# Patient Record
Sex: FEMALE | Race: WHITE | Employment: OTHER | URBAN - METROPOLITAN AREA
[De-identification: names, ages, dates, MRNs, and addresses within clinical notes are randomized per-mention and may not be internally consistent; named-entity substitution may affect disease eponyms.]

---

## 2019-02-21 RX ORDER — UBIDECARENONE 75 MG
5000 CAPSULE ORAL WEEKLY
COMMUNITY
End: 2020-09-30

## 2019-02-21 RX ORDER — METOPROLOL SUCCINATE 100 MG/1
100 TABLET, EXTENDED RELEASE ORAL DAILY
COMMUNITY
End: 2020-09-30

## 2019-02-21 RX ORDER — COVID-19 ANTIGEN TEST
220 KIT MISCELLANEOUS NIGHTLY
Status: ON HOLD | COMMUNITY
End: 2019-03-04

## 2019-02-23 ENCOUNTER — LAB ENCOUNTER (OUTPATIENT)
Dept: LAB | Age: 69
End: 2019-02-23
Attending: ORTHOPAEDIC SURGERY
Payer: COMMERCIAL

## 2019-02-23 DIAGNOSIS — M19.90 OSTEOARTHRITIS: ICD-10-CM

## 2019-02-23 LAB
ANTIBODY SCREEN: NEGATIVE
RH BLOOD TYPE: POSITIVE

## 2019-02-23 PROCEDURE — 87641 MR-STAPH DNA AMP PROBE: CPT

## 2019-02-23 PROCEDURE — 86900 BLOOD TYPING SEROLOGIC ABO: CPT

## 2019-02-23 PROCEDURE — 86850 RBC ANTIBODY SCREEN: CPT

## 2019-02-23 PROCEDURE — 36415 COLL VENOUS BLD VENIPUNCTURE: CPT

## 2019-02-23 PROCEDURE — 86901 BLOOD TYPING SEROLOGIC RH(D): CPT

## 2019-02-24 LAB — MRSA DNA SPEC QL NAA+PROBE: NEGATIVE

## 2019-02-28 ENCOUNTER — ANESTHESIA EVENT (OUTPATIENT)
Dept: SURGERY | Facility: HOSPITAL | Age: 69
DRG: 470 | End: 2019-02-28
Payer: COMMERCIAL

## 2019-02-28 ENCOUNTER — ANESTHESIA (OUTPATIENT)
Dept: SURGERY | Facility: HOSPITAL | Age: 69
DRG: 470 | End: 2019-02-28
Payer: COMMERCIAL

## 2019-02-28 ENCOUNTER — HOSPITAL ENCOUNTER (INPATIENT)
Facility: HOSPITAL | Age: 69
LOS: 6 days | Discharge: NURSING, REHAB OR LONG-TERM CARE | DRG: 470 | End: 2019-03-06
Attending: ORTHOPAEDIC SURGERY | Admitting: ORTHOPAEDIC SURGERY
Payer: COMMERCIAL

## 2019-02-28 ENCOUNTER — APPOINTMENT (OUTPATIENT)
Dept: GENERAL RADIOLOGY | Facility: HOSPITAL | Age: 69
DRG: 470 | End: 2019-02-28
Attending: ORTHOPAEDIC SURGERY
Payer: COMMERCIAL

## 2019-02-28 DIAGNOSIS — M19.90 OSTEOARTHRITIS: Primary | ICD-10-CM

## 2019-02-28 PROBLEM — I10 ESSENTIAL HYPERTENSION: Chronic | Status: ACTIVE | Noted: 2019-02-28

## 2019-02-28 PROBLEM — M17.11 OSTEOARTHRITIS OF RIGHT KNEE: Status: ACTIVE | Noted: 2019-02-28

## 2019-02-28 PROCEDURE — 3E0T3BZ INTRODUCTION OF ANESTHETIC AGENT INTO PERIPHERAL NERVES AND PLEXI, PERCUTANEOUS APPROACH: ICD-10-PCS | Performed by: ANESTHESIOLOGY

## 2019-02-28 PROCEDURE — 99232 SBSQ HOSP IP/OBS MODERATE 35: CPT | Performed by: HOSPITALIST

## 2019-02-28 PROCEDURE — 0SRC0J9 REPLACEMENT OF RIGHT KNEE JOINT WITH SYNTHETIC SUBSTITUTE, CEMENTED, OPEN APPROACH: ICD-10-PCS | Performed by: ORTHOPAEDIC SURGERY

## 2019-02-28 PROCEDURE — 73560 X-RAY EXAM OF KNEE 1 OR 2: CPT | Performed by: ORTHOPAEDIC SURGERY

## 2019-02-28 DEVICE — PSN TIB STM 5 DEG SZ C R: Type: IMPLANTABLE DEVICE | Status: FUNCTIONAL

## 2019-02-28 DEVICE — PSN ALL POLY PAT PLY 32MM: Type: IMPLANTABLE DEVICE | Status: FUNCTIONAL

## 2019-02-28 DEVICE — BIOMET BC R 1X40 US: Type: IMPLANTABLE DEVICE | Status: FUNCTIONAL

## 2019-02-28 DEVICE — PERSONA CEM FEM/CEM TIB/STD: Type: IMPLANTABLE DEVICE | Status: FUNCTIONAL

## 2019-02-28 DEVICE — PSN ASF PS 12MM PLY R 6-9CD: Type: IMPLANTABLE DEVICE | Status: FUNCTIONAL

## 2019-02-28 DEVICE — PSN FEM PS CMT CCR NRW SZ7 R: Type: IMPLANTABLE DEVICE | Status: FUNCTIONAL

## 2019-02-28 RX ORDER — DOCUSATE SODIUM 100 MG/1
100 CAPSULE, LIQUID FILLED ORAL 2 TIMES DAILY
Status: DISCONTINUED | OUTPATIENT
Start: 2019-02-28 | End: 2019-03-06

## 2019-02-28 RX ORDER — MORPHINE SULFATE 2 MG/ML
2 INJECTION, SOLUTION INTRAMUSCULAR; INTRAVENOUS EVERY 2 HOUR PRN
Status: ACTIVE | OUTPATIENT
Start: 2019-02-28 | End: 2019-03-01

## 2019-02-28 RX ORDER — BISACODYL 10 MG
10 SUPPOSITORY, RECTAL RECTAL
Status: DISCONTINUED | OUTPATIENT
Start: 2019-02-28 | End: 2019-03-06

## 2019-02-28 RX ORDER — ACETAMINOPHEN 325 MG/1
650 TABLET ORAL EVERY 4 HOURS PRN
Status: DISCONTINUED | OUTPATIENT
Start: 2019-03-01 | End: 2019-03-06

## 2019-02-28 RX ORDER — SENNOSIDES 8.6 MG
17.2 TABLET ORAL NIGHTLY
Status: DISCONTINUED | OUTPATIENT
Start: 2019-02-28 | End: 2019-03-06

## 2019-02-28 RX ORDER — MORPHINE SULFATE 2 MG/ML
1 INJECTION, SOLUTION INTRAMUSCULAR; INTRAVENOUS EVERY 2 HOUR PRN
Status: DISCONTINUED | OUTPATIENT
Start: 2019-03-01 | End: 2019-03-06

## 2019-02-28 RX ORDER — DIPHENHYDRAMINE HYDROCHLORIDE 50 MG/ML
25 INJECTION INTRAMUSCULAR; INTRAVENOUS ONCE AS NEEDED
Status: ACTIVE | OUTPATIENT
Start: 2019-02-28 | End: 2019-02-28

## 2019-02-28 RX ORDER — POLYETHYLENE GLYCOL 3350 17 G/17G
17 POWDER, FOR SOLUTION ORAL DAILY PRN
Status: DISCONTINUED | OUTPATIENT
Start: 2019-02-28 | End: 2019-03-06

## 2019-02-28 RX ORDER — LIDOCAINE HYDROCHLORIDE 10 MG/ML
INJECTION, SOLUTION EPIDURAL; INFILTRATION; INTRACAUDAL; PERINEURAL AS NEEDED
Status: DISCONTINUED | OUTPATIENT
Start: 2019-02-28 | End: 2019-02-28 | Stop reason: SURG

## 2019-02-28 RX ORDER — ONDANSETRON 2 MG/ML
4 INJECTION INTRAMUSCULAR; INTRAVENOUS EVERY 4 HOURS PRN
Status: ACTIVE | OUTPATIENT
Start: 2019-02-28 | End: 2019-03-02

## 2019-02-28 RX ORDER — DIPHENHYDRAMINE HYDROCHLORIDE 50 MG/ML
12.5 INJECTION INTRAMUSCULAR; INTRAVENOUS EVERY 4 HOURS PRN
Status: DISCONTINUED | OUTPATIENT
Start: 2019-02-28 | End: 2019-03-06

## 2019-02-28 RX ORDER — MORPHINE SULFATE 4 MG/ML
2 INJECTION, SOLUTION INTRAMUSCULAR; INTRAVENOUS EVERY 10 MIN PRN
Status: DISCONTINUED | OUTPATIENT
Start: 2019-02-28 | End: 2019-02-28 | Stop reason: HOSPADM

## 2019-02-28 RX ORDER — HYDROCODONE BITARTRATE AND ACETAMINOPHEN 5; 325 MG/1; MG/1
1 TABLET ORAL AS NEEDED
Status: DISCONTINUED | OUTPATIENT
Start: 2019-02-28 | End: 2019-02-28 | Stop reason: HOSPADM

## 2019-02-28 RX ORDER — EPHEDRINE SULFATE 50 MG/ML
INJECTION, SOLUTION INTRAVENOUS AS NEEDED
Status: DISCONTINUED | OUTPATIENT
Start: 2019-02-28 | End: 2019-02-28 | Stop reason: SURG

## 2019-02-28 RX ORDER — HYDROCODONE BITARTRATE AND ACETAMINOPHEN 5; 325 MG/1; MG/1
1 TABLET ORAL EVERY 4 HOURS PRN
Status: DISCONTINUED | OUTPATIENT
Start: 2019-03-01 | End: 2019-03-06

## 2019-02-28 RX ORDER — MORPHINE SULFATE 10 MG/ML
6 INJECTION, SOLUTION INTRAMUSCULAR; INTRAVENOUS EVERY 10 MIN PRN
Status: DISCONTINUED | OUTPATIENT
Start: 2019-02-28 | End: 2019-02-28 | Stop reason: HOSPADM

## 2019-02-28 RX ORDER — ONDANSETRON 2 MG/ML
INJECTION INTRAMUSCULAR; INTRAVENOUS AS NEEDED
Status: DISCONTINUED | OUTPATIENT
Start: 2019-02-28 | End: 2019-02-28 | Stop reason: SURG

## 2019-02-28 RX ORDER — MORPHINE SULFATE 4 MG/ML
4 INJECTION, SOLUTION INTRAMUSCULAR; INTRAVENOUS EVERY 10 MIN PRN
Status: DISCONTINUED | OUTPATIENT
Start: 2019-02-28 | End: 2019-02-28 | Stop reason: HOSPADM

## 2019-02-28 RX ORDER — ACETAMINOPHEN 500 MG
1000 TABLET ORAL EVERY 8 HOURS
Status: COMPLETED | OUTPATIENT
Start: 2019-02-28 | End: 2019-03-01

## 2019-02-28 RX ORDER — ACETAMINOPHEN 500 MG
1000 TABLET ORAL ONCE
Status: COMPLETED | OUTPATIENT
Start: 2019-02-28 | End: 2019-02-28

## 2019-02-28 RX ORDER — DEXAMETHASONE SODIUM PHOSPHATE 4 MG/ML
VIAL (ML) INJECTION AS NEEDED
Status: DISCONTINUED | OUTPATIENT
Start: 2019-02-28 | End: 2019-02-28 | Stop reason: SURG

## 2019-02-28 RX ORDER — SODIUM PHOSPHATE, DIBASIC AND SODIUM PHOSPHATE, MONOBASIC 7; 19 G/133ML; G/133ML
1 ENEMA RECTAL ONCE AS NEEDED
Status: DISCONTINUED | OUTPATIENT
Start: 2019-02-28 | End: 2019-03-06

## 2019-02-28 RX ORDER — SODIUM CHLORIDE, SODIUM LACTATE, POTASSIUM CHLORIDE, CALCIUM CHLORIDE 600; 310; 30; 20 MG/100ML; MG/100ML; MG/100ML; MG/100ML
INJECTION, SOLUTION INTRAVENOUS CONTINUOUS
Status: DISCONTINUED | OUTPATIENT
Start: 2019-02-28 | End: 2019-03-06

## 2019-02-28 RX ORDER — METOCLOPRAMIDE 10 MG/1
10 TABLET ORAL ONCE
Status: DISCONTINUED | OUTPATIENT
Start: 2019-02-28 | End: 2019-02-28 | Stop reason: HOSPADM

## 2019-02-28 RX ORDER — CEFAZOLIN SODIUM/WATER 2 G/20 ML
2 SYRINGE (ML) INTRAVENOUS ONCE
Status: DISCONTINUED | OUTPATIENT
Start: 2019-02-28 | End: 2019-02-28 | Stop reason: HOSPADM

## 2019-02-28 RX ORDER — MORPHINE SULFATE 4 MG/ML
6 INJECTION, SOLUTION INTRAMUSCULAR; INTRAVENOUS EVERY 2 HOUR PRN
Status: ACTIVE | OUTPATIENT
Start: 2019-02-28 | End: 2019-03-01

## 2019-02-28 RX ORDER — METOPROLOL TARTRATE 5 MG/5ML
2.5 INJECTION INTRAVENOUS ONCE
Status: DISCONTINUED | OUTPATIENT
Start: 2019-02-28 | End: 2019-02-28 | Stop reason: HOSPADM

## 2019-02-28 RX ORDER — MAGNESIUM HYDROXIDE 1200 MG/15ML
LIQUID ORAL CONTINUOUS PRN
Status: COMPLETED | OUTPATIENT
Start: 2019-02-28 | End: 2019-02-28

## 2019-02-28 RX ORDER — ONDANSETRON 2 MG/ML
4 INJECTION INTRAMUSCULAR; INTRAVENOUS ONCE AS NEEDED
Status: DISCONTINUED | OUTPATIENT
Start: 2019-02-28 | End: 2019-02-28 | Stop reason: HOSPADM

## 2019-02-28 RX ORDER — MORPHINE SULFATE 4 MG/ML
4 INJECTION, SOLUTION INTRAMUSCULAR; INTRAVENOUS EVERY 2 HOUR PRN
Status: DISCONTINUED | OUTPATIENT
Start: 2019-03-01 | End: 2019-03-06

## 2019-02-28 RX ORDER — METOCLOPRAMIDE HYDROCHLORIDE 5 MG/ML
10 INJECTION INTRAMUSCULAR; INTRAVENOUS EVERY 6 HOURS PRN
Status: ACTIVE | OUTPATIENT
Start: 2019-02-28 | End: 2019-03-02

## 2019-02-28 RX ORDER — OXYCODONE HYDROCHLORIDE 5 MG/1
5 TABLET ORAL EVERY 4 HOURS PRN
Status: DISPENSED | OUTPATIENT
Start: 2019-02-28 | End: 2019-03-01

## 2019-02-28 RX ORDER — NALOXONE HYDROCHLORIDE 0.4 MG/ML
80 INJECTION, SOLUTION INTRAMUSCULAR; INTRAVENOUS; SUBCUTANEOUS AS NEEDED
Status: DISCONTINUED | OUTPATIENT
Start: 2019-02-28 | End: 2019-02-28 | Stop reason: HOSPADM

## 2019-02-28 RX ORDER — MORPHINE SULFATE 15 MG/1
15 TABLET ORAL EVERY 4 HOURS PRN
Status: DISPENSED | OUTPATIENT
Start: 2019-02-28 | End: 2019-03-01

## 2019-02-28 RX ORDER — METOPROLOL SUCCINATE 100 MG/1
100 TABLET, EXTENDED RELEASE ORAL DAILY
Status: DISCONTINUED | OUTPATIENT
Start: 2019-03-01 | End: 2019-03-06

## 2019-02-28 RX ORDER — CEFAZOLIN SODIUM/WATER 2 G/20 ML
2 SYRINGE (ML) INTRAVENOUS EVERY 8 HOURS
Status: DISCONTINUED | OUTPATIENT
Start: 2019-02-28 | End: 2019-02-28

## 2019-02-28 RX ORDER — CEFAZOLIN SODIUM/WATER 2 G/20 ML
2 SYRINGE (ML) INTRAVENOUS EVERY 8 HOURS
Status: COMPLETED | OUTPATIENT
Start: 2019-02-28 | End: 2019-03-01

## 2019-02-28 RX ORDER — OXYCODONE HYDROCHLORIDE 5 MG/1
10 TABLET ORAL EVERY 4 HOURS PRN
Status: ACTIVE | OUTPATIENT
Start: 2019-02-28 | End: 2019-03-01

## 2019-02-28 RX ORDER — FAMOTIDINE 20 MG/1
20 TABLET ORAL ONCE
Status: DISCONTINUED | OUTPATIENT
Start: 2019-02-28 | End: 2019-02-28 | Stop reason: HOSPADM

## 2019-02-28 RX ORDER — DIPHENHYDRAMINE HCL 25 MG
25 CAPSULE ORAL EVERY 4 HOURS PRN
Status: DISCONTINUED | OUTPATIENT
Start: 2019-02-28 | End: 2019-03-06

## 2019-02-28 RX ORDER — SODIUM CHLORIDE 0.9 % (FLUSH) 0.9 %
10 SYRINGE (ML) INJECTION AS NEEDED
Status: DISCONTINUED | OUTPATIENT
Start: 2019-02-28 | End: 2019-03-06

## 2019-02-28 RX ORDER — HYDROCODONE BITARTRATE AND ACETAMINOPHEN 5; 325 MG/1; MG/1
2 TABLET ORAL EVERY 4 HOURS PRN
Status: DISCONTINUED | OUTPATIENT
Start: 2019-03-01 | End: 2019-03-06

## 2019-02-28 RX ORDER — HYDROCODONE BITARTRATE AND ACETAMINOPHEN 5; 325 MG/1; MG/1
2 TABLET ORAL AS NEEDED
Status: DISCONTINUED | OUTPATIENT
Start: 2019-02-28 | End: 2019-02-28 | Stop reason: HOSPADM

## 2019-02-28 RX ORDER — MORPHINE SULFATE 4 MG/ML
4 INJECTION, SOLUTION INTRAMUSCULAR; INTRAVENOUS EVERY 2 HOUR PRN
Status: ACTIVE | OUTPATIENT
Start: 2019-02-28 | End: 2019-03-01

## 2019-02-28 RX ORDER — MORPHINE SULFATE 2 MG/ML
2 INJECTION, SOLUTION INTRAMUSCULAR; INTRAVENOUS EVERY 2 HOUR PRN
Status: DISCONTINUED | OUTPATIENT
Start: 2019-03-01 | End: 2019-03-06

## 2019-02-28 RX ORDER — SODIUM CHLORIDE, SODIUM LACTATE, POTASSIUM CHLORIDE, CALCIUM CHLORIDE 600; 310; 30; 20 MG/100ML; MG/100ML; MG/100ML; MG/100ML
INJECTION, SOLUTION INTRAVENOUS CONTINUOUS
Status: DISCONTINUED | OUTPATIENT
Start: 2019-02-28 | End: 2019-02-28 | Stop reason: HOSPADM

## 2019-02-28 RX ADMIN — EPHEDRINE SULFATE 5 MG: 50 INJECTION, SOLUTION INTRAVENOUS at 11:26:00

## 2019-02-28 RX ADMIN — EPHEDRINE SULFATE 5 MG: 50 INJECTION, SOLUTION INTRAVENOUS at 11:24:00

## 2019-02-28 RX ADMIN — LIDOCAINE HYDROCHLORIDE 25 MG: 10 INJECTION, SOLUTION EPIDURAL; INFILTRATION; INTRACAUDAL; PERINEURAL at 11:16:00

## 2019-02-28 RX ADMIN — SODIUM CHLORIDE, SODIUM LACTATE, POTASSIUM CHLORIDE, CALCIUM CHLORIDE: 600; 310; 30; 20 INJECTION, SOLUTION INTRAVENOUS at 12:43:00

## 2019-02-28 RX ADMIN — EPHEDRINE SULFATE 5 MG: 50 INJECTION, SOLUTION INTRAVENOUS at 12:27:00

## 2019-02-28 RX ADMIN — DEXAMETHASONE SODIUM PHOSPHATE 4 MG: 4 MG/ML VIAL (ML) INJECTION at 12:22:00

## 2019-02-28 RX ADMIN — ONDANSETRON 4 MG: 2 INJECTION INTRAMUSCULAR; INTRAVENOUS at 12:22:00

## 2019-02-28 NOTE — ANESTHESIA PREPROCEDURE EVALUATION
Anesthesia PreOp Note    HPI:     Cheikh Vo is a 70year old female who presents for preoperative consultation requested by: Ellis Diaz MD    Date of Surgery: 2/28/2019    Procedure(s):  KNEE TOTAL REPLACEMENT  Indication: osteoarthritis    Rel education: Not on file      Highest education level: Not on file    Occupational History      Not on file    Social Needs      Financial resource strain: Not on file      Food insecurity:        Worry: Not on file        Inability: Not on file      Transpo notes reviewed    No history of anesthetic complications   Airway   Mallampati: II  Neck ROM: full  Dental - normal exam     Pulmonary - negative ROS and normal exam   Cardiovascular - negative ROS and normal exam  (+) hypertension,     Neuro/Psych - negat

## 2019-02-28 NOTE — BRIEF OP NOTE
Pre-Operative Diagnosis: osteoarthritis     Post-Operative Diagnosis: osteoarthritis      Procedure Performed:   Procedure(s):  right total knee arthroplasty    Surgeon(s) and Role:     Vicky Garcia MD - Primary    Assistant(s):  Surgical Assistant.:

## 2019-02-28 NOTE — ANESTHESIA POSTPROCEDURE EVALUATION
Patient: Presley Greene    Procedure Summary     Date:  02/28/19 Room / Location:  [address] [place] [address] MAIN OR 05 / [address] [place] [address] MAIN OR    Anesthesia Start:  0518 Anesthesia Stop:  1243    Procedure:  KNEE TOTAL REPLACEMENT (Right ) Diagnosis:  (osteoarthritis)    Surgeon:  Chiara Tate

## 2019-02-28 NOTE — H&P
[address] Babs Webster Patient Status:  Surgery Admit    1950 MRN [id number]   Location [address] [facility] Attending Sandford Duane, MD   Hosp Day # 0 PCP No primary care pr moist. Pupils are equal and round, reactive to light and accommodate. Pupils are approximately 3mm and react to 2mm with reaction to light. Oropharynx is clear. Neck: No tenderness to palpitation.   Full range of motion to flexion and extension, lateral

## 2019-02-28 NOTE — OR NURSING
Gosia Estrada Dark at bedside to , pt questions answered instructed on pian management, plan of care and room assignment.

## 2019-02-28 NOTE — ANESTHESIA PROCEDURE NOTES
ANESTHESIA INTUBATION  Urgency: elective      General Information and Staff    Patient location during procedure: OR  Anesthesiologist: Lynn Swanson MD  Resident/CRNA: Che Chung CRNA  Performed: anesthesiologist and CRNA     Indications and

## 2019-02-28 NOTE — ANESTHESIA PROCEDURE NOTES
Peripheral Block    Anesthesiologist:  Stalin Jha MD  CRNA:  Gracia Arzola CRNA  Performed by:   Anesthesiologist  Patient Location:  PACU  Start Time:  2/28/2019 10:56 AM  End Time:  2/28/2019 10:59 AM  Site Identification: ultrasound guided,

## 2019-02-28 NOTE — PHYSICAL THERAPY NOTE
PHYSICAL THERAPY KNEE EVALUATION - INPATIENT       Room Number: 432/432-A  Evaluation Date: 2/28/2019  Type of Evaluation: Initial  Physician Order: PT Eval and Treat    Presenting Problem: R TKA  Reason for Therapy: Mobility Dysfunction and Discharge Plan conservative management.  she complains of debilitating pain.  She elects right total knee arthroplasty.  Surgery risks including but not limited to fx/ nerve injury/ blood loss/ blood clots/ infection/ contracture/ failure of prosthesis and continued pain blankets)?: A Little   -   Sitting down on and standing up from a chair with arms (e.g., wheelchair, bedside commode, etc.): A Little   -   Moving from lying on back to sitting on the side of the bed?: A Little   How much help from another person does the instructions provided in preparation for discharge.    Goal #6  Current Status

## 2019-03-01 ENCOUNTER — APPOINTMENT (OUTPATIENT)
Dept: CT IMAGING | Facility: HOSPITAL | Age: 69
DRG: 470 | End: 2019-03-01
Attending: HOSPITALIST
Payer: COMMERCIAL

## 2019-03-01 ENCOUNTER — APPOINTMENT (OUTPATIENT)
Dept: CV DIAGNOSTICS | Facility: HOSPITAL | Age: 69
DRG: 470 | End: 2019-03-01
Attending: HOSPITALIST
Payer: COMMERCIAL

## 2019-03-01 ENCOUNTER — APPOINTMENT (OUTPATIENT)
Dept: ULTRASOUND IMAGING | Facility: HOSPITAL | Age: 69
DRG: 470 | End: 2019-03-01
Attending: HOSPITALIST
Payer: COMMERCIAL

## 2019-03-01 LAB
ANION GAP SERPL CALC-SCNC: 9 MMOL/L (ref 0–18)
BASOPHILS # BLD AUTO: 0.01 X10(3) UL (ref 0–0.2)
BASOPHILS NFR BLD AUTO: 0.1 %
BUN BLD-MCNC: 13 MG/DL (ref 7–18)
BUN/CREAT SERPL: 15.9 (ref 10–20)
CALCIUM BLD-MCNC: 8.1 MG/DL (ref 8.5–10.1)
CHLORIDE SERPL-SCNC: 106 MMOL/L (ref 98–107)
CO2 SERPL-SCNC: 25 MMOL/L (ref 21–32)
CREAT BLD-MCNC: 0.82 MG/DL (ref 0.55–1.02)
DEPRECATED RDW RBC AUTO: 44.3 FL (ref 35.1–46.3)
DEPRECATED RDW RBC AUTO: 46 FL (ref 35.1–46.3)
EOSINOPHIL # BLD AUTO: 0.01 X10(3) UL (ref 0–0.7)
EOSINOPHIL NFR BLD AUTO: 0.1 %
ERYTHROCYTE [DISTWIDTH] IN BLOOD BY AUTOMATED COUNT: 16.1 % (ref 11–15)
ERYTHROCYTE [DISTWIDTH] IN BLOOD BY AUTOMATED COUNT: 16.3 % (ref 11–15)
EST. AVERAGE GLUCOSE BLD GHB EST-MCNC: 126 MG/DL (ref 68–126)
GLUCOSE BLD-MCNC: 157 MG/DL (ref 70–99)
GLUCOSE BLDC GLUCOMTR-MCNC: 169 MG/DL (ref 70–99)
HAV IGM SER QL: 2.1 MG/DL (ref 1.6–2.6)
HBA1C MFR BLD HPLC: 6 % (ref ?–5.7)
HCT VFR BLD AUTO: 24.4 % (ref 35–48)
HCT VFR BLD AUTO: 26.9 % (ref 35–48)
HGB BLD-MCNC: 7.7 G/DL (ref 12–16)
HGB BLD-MCNC: 8.4 G/DL (ref 12–16)
IMM GRANULOCYTES # BLD AUTO: 0.04 X10(3) UL (ref 0–1)
IMM GRANULOCYTES NFR BLD: 0.4 %
LYMPHOCYTES # BLD AUTO: 1.42 X10(3) UL (ref 1–4)
LYMPHOCYTES NFR BLD AUTO: 15 %
MCH RBC QN AUTO: 23.7 PG (ref 26–34)
MCH RBC QN AUTO: 24.2 PG (ref 26–34)
MCHC RBC AUTO-ENTMCNC: 31.2 G/DL (ref 31–37)
MCHC RBC AUTO-ENTMCNC: 31.6 G/DL (ref 31–37)
MCV RBC AUTO: 76 FL (ref 80–100)
MCV RBC AUTO: 76.7 FL (ref 80–100)
MONOCYTES # BLD AUTO: 1 X10(3) UL (ref 0.1–1)
MONOCYTES NFR BLD AUTO: 10.6 %
NEUTROPHILS # BLD AUTO: 6.97 X10 (3) UL (ref 1.5–7.7)
NEUTROPHILS # BLD AUTO: 6.97 X10(3) UL (ref 1.5–7.7)
NEUTROPHILS NFR BLD AUTO: 73.8 %
OSMOLALITY SERPL CALC.SUM OF ELEC: 293 MOSM/KG (ref 275–295)
PLATELET # BLD AUTO: 156 10(3)UL (ref 150–450)
PLATELET # BLD AUTO: 160 10(3)UL (ref 150–450)
POTASSIUM SERPL-SCNC: 3.5 MMOL/L (ref 3.5–5.1)
RBC # BLD AUTO: 3.18 X10(6)UL (ref 3.8–5.3)
RBC # BLD AUTO: 3.54 X10(6)UL (ref 3.8–5.3)
SODIUM SERPL-SCNC: 140 MMOL/L (ref 136–145)
TROPONIN I SERPL-MCNC: <0.045 NG/ML (ref ?–0.04)
WBC # BLD AUTO: 8.3 X10(3) UL (ref 4–11)
WBC # BLD AUTO: 9.5 X10(3) UL (ref 4–11)

## 2019-03-01 PROCEDURE — 70450 CT HEAD/BRAIN W/O DYE: CPT | Performed by: HOSPITALIST

## 2019-03-01 PROCEDURE — 99291 CRITICAL CARE FIRST HOUR: CPT | Performed by: HOSPITALIST

## 2019-03-01 PROCEDURE — 93306 TTE W/DOPPLER COMPLETE: CPT | Performed by: HOSPITALIST

## 2019-03-01 PROCEDURE — 93880 EXTRACRANIAL BILAT STUDY: CPT | Performed by: HOSPITALIST

## 2019-03-01 RX ORDER — SODIUM CHLORIDE 9 MG/ML
INJECTION, SOLUTION INTRAVENOUS
Status: DISPENSED
Start: 2019-03-01 | End: 2019-03-02

## 2019-03-01 RX ORDER — POTASSIUM CHLORIDE 20 MEQ/1
40 TABLET, EXTENDED RELEASE ORAL EVERY 4 HOURS
Status: COMPLETED | OUTPATIENT
Start: 2019-03-01 | End: 2019-03-01

## 2019-03-01 RX ORDER — SODIUM CHLORIDE 9 MG/ML
INJECTION, SOLUTION INTRAVENOUS
Status: COMPLETED
Start: 2019-03-01 | End: 2019-03-01

## 2019-03-01 RX ORDER — SODIUM CHLORIDE 9 MG/ML
INJECTION, SOLUTION INTRAVENOUS CONTINUOUS
Status: DISCONTINUED | OUTPATIENT
Start: 2019-03-01 | End: 2019-03-06

## 2019-03-01 NOTE — PHYSICAL THERAPY NOTE
Patient unable to be see for her pm session d/t testing after RRT was called during am session. RN aware.

## 2019-03-01 NOTE — PROGRESS NOTES
RRT called while patient was working with physical therapy after feeling dizzy after getting up and passed out. Pt with no complaints of dizziness prior to RRT. 500cc of NS bolus, CT of head, 2D Echo, repeat CBC at 1500 ordered.  Patient made comfortable an

## 2019-03-01 NOTE — PROGRESS NOTES
[place] [facility]    Progress Note    Madeleine Bigger Patient Status:  Inpatient    1950 MRN [id number]   Location [facility] 4W/SW/SE Attending Skeeter Jeans, MD   Hosp Day # 1 PCP No primary care provider on file.        Subj Sulfate IR, morphINE sulfate **OR** morphINE sulfate **OR** morphINE sulfate, Normal Saline Flush, sodium chloride 0.9%, PEG 3350, magnesium hydroxide, bisacodyl, FLEET ENEMA, ondansetron HCl, Metoclopramide HCl, Prochlorperazine Edisylate, diphenhydrAMINE events during the episode  -EKG ok  -check orthostatics early evening if able  -IVF bolus 500cc then 100cc/hr  -monitor hgb and drain output   -correct low K  -neurochecks   -CT brain  -echo  -carotids      Osteoarthritis of right knee  -pain control  -xar

## 2019-03-01 NOTE — PHYSICAL THERAPY NOTE
PHYSICAL THERAPY KNEE TREATMENT NOTE - INPATIENT     Room Number: 432/432-A             Presenting Problem: R TKA    Problem List  Principal Problem:    Osteoarthritis of right knee  Active Problems:    Essential hypertension      PHYSICAL THERAPY ASSESSME tested    ACTIVITY TOLERANCE  Pulse: 82  Heart Rate Source: Monitor  Resp: 18  BP: 133/53  BP Location: Right arm  BP Method: Automatic  Patient Position: Lying    O2 WALK  SPO2 on Room Air at Rest: 100               AM-PAC '6-Clicks' INPATIENT SHORT FORM is able to demonstrate supine - sit EOB @ level: modified independent     Goal #1   Current Status Min A   Goal #2 Patient is able to demonstrate transfers Sit to/from Stand at assistance level: modified independent     Goal #2  Current Status Min A   Goal

## 2019-03-01 NOTE — CM/SW NOTE
SW met with the patient at bedside. The patient lives in 1 level house  3 steps to get in. The patient responds being independent prior to admission with all ADL's, ambulation and driving. Patient denies use of any DME.      Patient would like to go to [facility]

## 2019-03-01 NOTE — PROGRESS NOTES
[place] [facility] - [facility]    Progress Note    Oralia Sneed Patient Status:  Inpatient    1950 MRN [id number]   Location [facility] 4W/SW/SE Attending Gloria Bose MD   Hosp Day # 1 PCP No primary care provider on file.      Subj

## 2019-03-01 NOTE — PROGRESS NOTES
RRT    *See RRT Documentation Record*    Reason the RRT was called: Pt collapsed back into bed after ambulating to bathroom. Assessment of patient leading up to RRT: Pt was sturdy and assisted into the bathroom. No complaints by pt.      Interventions/T

## 2019-03-02 LAB
ANION GAP SERPL CALC-SCNC: 6 MMOL/L (ref 0–18)
ANTIBODY SCREEN: NEGATIVE
BASOPHILS # BLD AUTO: 0.03 X10(3) UL (ref 0–0.2)
BASOPHILS NFR BLD AUTO: 0.4 %
BUN BLD-MCNC: 6 MG/DL (ref 7–18)
BUN/CREAT SERPL: 14.3 (ref 10–20)
CALCIUM BLD-MCNC: 7.6 MG/DL (ref 8.5–10.1)
CHLORIDE SERPL-SCNC: 110 MMOL/L (ref 98–107)
CO2 SERPL-SCNC: 24 MMOL/L (ref 21–32)
CREAT BLD-MCNC: 0.42 MG/DL (ref 0.55–1.02)
DEPRECATED RDW RBC AUTO: 46.9 FL (ref 35.1–46.3)
EOSINOPHIL # BLD AUTO: 0.08 X10(3) UL (ref 0–0.7)
EOSINOPHIL NFR BLD AUTO: 1 %
ERYTHROCYTE [DISTWIDTH] IN BLOOD BY AUTOMATED COUNT: 16.5 % (ref 11–15)
GLUCOSE BLD-MCNC: 102 MG/DL (ref 70–99)
HCT VFR BLD AUTO: 25.5 % (ref 35–48)
HGB BLD-MCNC: 7.9 G/DL (ref 12–16)
IMM GRANULOCYTES # BLD AUTO: 0.03 X10(3) UL (ref 0–1)
IMM GRANULOCYTES NFR BLD: 0.4 %
LYMPHOCYTES # BLD AUTO: 1.04 X10(3) UL (ref 1–4)
LYMPHOCYTES NFR BLD AUTO: 13 %
MCH RBC QN AUTO: 24 PG (ref 26–34)
MCHC RBC AUTO-ENTMCNC: 31 G/DL (ref 31–37)
MCV RBC AUTO: 77.5 FL (ref 80–100)
MONOCYTES # BLD AUTO: 0.83 X10(3) UL (ref 0.1–1)
MONOCYTES NFR BLD AUTO: 10.4 %
NEUTROPHILS # BLD AUTO: 5.98 X10 (3) UL (ref 1.5–7.7)
NEUTROPHILS # BLD AUTO: 5.98 X10(3) UL (ref 1.5–7.7)
NEUTROPHILS NFR BLD AUTO: 74.8 %
OSMOLALITY SERPL CALC.SUM OF ELEC: 288 MOSM/KG (ref 275–295)
PLATELET # BLD AUTO: 145 10(3)UL (ref 150–450)
POTASSIUM SERPL-SCNC: 3.9 MMOL/L (ref 3.5–5.1)
RBC # BLD AUTO: 3.29 X10(6)UL (ref 3.8–5.3)
RH BLOOD TYPE: POSITIVE
SODIUM SERPL-SCNC: 140 MMOL/L (ref 136–145)
WBC # BLD AUTO: 8 X10(3) UL (ref 4–11)

## 2019-03-02 PROCEDURE — 30233N1 TRANSFUSION OF NONAUTOLOGOUS RED BLOOD CELLS INTO PERIPHERAL VEIN, PERCUTANEOUS APPROACH: ICD-10-PCS | Performed by: HOSPITALIST

## 2019-03-02 PROCEDURE — 99291 CRITICAL CARE FIRST HOUR: CPT | Performed by: HOSPITALIST

## 2019-03-02 RX ORDER — MIDODRINE HYDROCHLORIDE 5 MG/1
2.5 TABLET ORAL 3 TIMES DAILY
Status: DISCONTINUED | OUTPATIENT
Start: 2019-03-02 | End: 2019-03-06

## 2019-03-02 RX ORDER — SODIUM CHLORIDE 0.9 % (FLUSH) 0.9 %
10 SYRINGE (ML) INJECTION AS NEEDED
Status: DISCONTINUED | OUTPATIENT
Start: 2019-03-02 | End: 2019-03-06

## 2019-03-02 RX ORDER — SODIUM CHLORIDE 9 MG/ML
INJECTION, SOLUTION INTRAVENOUS ONCE
Status: COMPLETED | OUTPATIENT
Start: 2019-03-02 | End: 2019-03-02

## 2019-03-02 NOTE — OCCUPATIONAL THERAPY NOTE
OCCUPATIONAL THERAPY EVALUATION - INPATIENT      Room Number: 404/404-A  Evaluation Date: 3/2/2019  Type of Evaluation: Initial  Presenting Problem: (R TKA ); RRT called during evaluation    Physician Order: IP Consult to Occupational Therapy  Reason for T with HH, however pt would benefit from subacute rehab given RRTs called each time pt gets out of bed in the past day.     DISCHARGE RECOMMENDATIONS  OT Discharge Recommendations: 24 hour care/supervision;Sub-acute rehabilitation       PLAN  OT Treatment Jagdish a. m. )      COGNITION  Overall Cognitive Status:  WFL - within functional limits  Orientation Level:  oriented x4  Following Commands:  follows one step commands with repetition    RANGE OF MOTION   Upper extremity ROM is within functional limits     ALAN at sink level with SBA. Comment:    Patient will complete toileting with SBA.    Comment:         Goals  on: 2019  Frequency: 3-5x/week     BORIS Renteria/DELVIS 3/2/2019

## 2019-03-02 NOTE — PHYSICAL THERAPY NOTE
PHYSICAL THERAPY KNEE TREATMENT NOTE - INPATIENT     Room Number: 432/432-A             Presenting Problem: R TKA    Problem List  Principal Problem:    Osteoarthritis of right knee  Active Problems:    Essential hypertension    Syncope      PHYSICAL THERA adjusting bedclothes, sheets and blankets)?: A Little   -   Sitting down on and standing up from a chair with arms (e.g., wheelchair, bedside commode, etc.): A Little   -   Moving from lying on back to sitting on the side of the bed?: A Little   How much h Current Status 20  ft with RW with Mod A   Goal #4 Patient will negotiate 3 stairs/one curb w/ assistive device and supervision   Goal #4   Current Status NT   Goal #5  AROM 0 degrees extension to 95 degrees flexion     Goal #5   Current Status In progre

## 2019-03-02 NOTE — PROGRESS NOTES
RRT    *See RRT Documentation Record*    Reason the RRT was called: pt unresponsive  Assessment of patient leading up to RRT: pt checked for orthostatic prior getting OOB, denied any dizziness and was able to walk to bathroom with PT/OT.  Started to c/o diz

## 2019-03-02 NOTE — CM/SW NOTE
RUTHANN spoke with RN regarding the pt's pending arrangements. Pt had been referred to [address] [place] [address]. However, insurance auth was NOT obtained prior to the weekend. Insurance auth request will be pursued on Monday when they re-open.     [place] [facility]

## 2019-03-02 NOTE — PLAN OF CARE
Problem: SKIN/TISSUE INTEGRITY - ADULT  Goal: Incision(s), wounds(s) or drain site(s) healing without S/S of infection  INTERVENTIONS:  - Assess and document risk factors for pressure ulcer development  - Assess and document skin integrity  - Assess and do orthostatic blood pressures later in day.    Goal: Maintain proper alignment of affected body part  INTERVENTIONS:  - Support and protect limb and body alignment per provider's orders  - Instruct and reinforce with patient and family use of appropriate assi light)  - Provide an  as needed  - Communicate barriers and strategies to overcome with those who interact with patient  Outcome: Progressing  Patient Tajik speaking, family at bedside to translate, able to verbalize needs.

## 2019-03-02 NOTE — PROGRESS NOTES
[facility]    Progress Note    Cheikh Vo Patient Status:  Inpatient    1950 MRN [id number]   Location [facility] 4W/SW/SE Attending Farideh Rea MD   Hosp Day # 2 PCP No primary care provider on file.      Subjecti

## 2019-03-02 NOTE — SIGNIFICANT EVENT
Rapid response/progress note    Rapid response called by nurse because of a episode of loss of consciousness. Patient was walking to the bathroom with assistance. She made it to the toilet sat down and then lost consciousness. Only for a few seconds.   Rajiv Scruggs though she has been tachycardic  -Continue telemetry  -Await echocardiogram results    Status post total knee arthroplasty, right. Unable to assess how patient is doing since her physical therapy has been limited by syncopal episodes.   Her pain is well co

## 2019-03-02 NOTE — PLAN OF CARE
MUSCULOSKELETAL - ADULT    • Return mobility to safest level of function Not Progressing          Altered Communication/Language Barrier    • Patient/Family is able to understand and participate in their care [facility] [address] position, uses call light approprietly, call light and belonging in reach, needs attended to.

## 2019-03-03 LAB
BASOPHILS # BLD AUTO: 0.02 X10(3) UL (ref 0–0.2)
BASOPHILS NFR BLD AUTO: 0.3 %
BLOOD TYPE BARCODE: 6200
DEPRECATED RDW RBC AUTO: 45.3 FL (ref 35.1–46.3)
EOSINOPHIL # BLD AUTO: 0.26 X10(3) UL (ref 0–0.7)
EOSINOPHIL NFR BLD AUTO: 3.5 %
ERYTHROCYTE [DISTWIDTH] IN BLOOD BY AUTOMATED COUNT: 16.3 % (ref 11–15)
HCT VFR BLD AUTO: 26.8 % (ref 35–48)
HGB BLD-MCNC: 8.5 G/DL (ref 12–16)
IMM GRANULOCYTES # BLD AUTO: 0.02 X10(3) UL (ref 0–1)
IMM GRANULOCYTES NFR BLD: 0.3 %
LYMPHOCYTES # BLD AUTO: 1.3 X10(3) UL (ref 1–4)
LYMPHOCYTES NFR BLD AUTO: 17.4 %
MCH RBC QN AUTO: 24.4 PG (ref 26–34)
MCHC RBC AUTO-ENTMCNC: 31.7 G/DL (ref 31–37)
MCV RBC AUTO: 76.8 FL (ref 80–100)
MONOCYTES # BLD AUTO: 0.72 X10(3) UL (ref 0.1–1)
MONOCYTES NFR BLD AUTO: 9.6 %
NEUTROPHILS # BLD AUTO: 5.15 X10 (3) UL (ref 1.5–7.7)
NEUTROPHILS # BLD AUTO: 5.15 X10(3) UL (ref 1.5–7.7)
NEUTROPHILS NFR BLD AUTO: 68.9 %
PLATELET # BLD AUTO: 166 10(3)UL (ref 150–450)
RBC # BLD AUTO: 3.49 X10(6)UL (ref 3.8–5.3)
WBC # BLD AUTO: 7.5 X10(3) UL (ref 4–11)

## 2019-03-03 PROCEDURE — 99232 SBSQ HOSP IP/OBS MODERATE 35: CPT | Performed by: HOSPITALIST

## 2019-03-03 RX ORDER — HYDROCODONE BITARTRATE AND ACETAMINOPHEN 5; 325 MG/1; MG/1
1-2 TABLET ORAL EVERY 4 HOURS PRN
Qty: 40 TABLET | Refills: 0 | Status: SHIPPED | OUTPATIENT
Start: 2019-03-03 | End: 2020-09-30

## 2019-03-03 NOTE — PLAN OF CARE
Ok to get pt up with PT today with staff present, per Dr. Mehreen León. Orthostatics to be done prior to working with therapy.

## 2019-03-03 NOTE — CONSULTS
[facility]    PATIENT'S NAME: Jone Greenberg   ATTENDING PHYSICIAN: Lynda Duckworth.  Tang Monique MD   CONSULTING PHYSICIAN: Dean Roberson Mt, DO   PATIENT ACCOUNT#:   [de-identified]    LOCATION:  [address] [place] [address] #:   [id number]       DATE OF BIRTH: atraumatic. Oral mucosa is moist.  NECK:  Without jugular venous distention. LUNGS:  Clear to auscultation. HEART:  S1, S2 are regular. There is a diastolic murmur along the mitral area. ABDOMEN:  Soft, nontender.   EXTREMITIES:  1+ right lower extremi hesitate to call with any questions.       Dictated By Steven Gonzales DO  d: 03/02/2019 15:52:33  t: 03/02/2019 16:05:54  [facility] [id number]  DQ/

## 2019-03-03 NOTE — PROGRESS NOTES
Able to get pt to a standing position without c/o dizziness. Orthostatic VS checked. No drop in BP. Pt was able to stand up for 5 min- asymptomatic. Family at the bedside translating.

## 2019-03-03 NOTE — PROGRESS NOTES
Patient seen in follow up. Patient denies any chest pain or sob. No syncope. BP higher which is probably her norm.    03/03/19  1553   BP: 145/68   Pulse: 75   Resp: 16   Temp: 98.4 °F (36.9 °C)       Intake/Output Summary (Last 24 hours) at 3/3/2019 morphINE sulfate (PF) 2 MG/ML injection 2 mg 2 mg Intravenous Q2H PRN   Or      morphINE sulfate (PF) 4 MG/ML injection 4 mg 4 mg Intravenous Q2H PRN   Metoprolol Succinate ER (Toprol XL) 24 hr tab 100 mg 100 mg Oral Daily       Medications Prior to Admiss No further syncope. Keep current meds. Outpatient titration to stopping, tico family for followup.       Dean Kumar DO [facility]  [address], [facility]  Phone: [phone]  [email or web address]

## 2019-03-03 NOTE — PROGRESS NOTES
[facility]    Progress Note    Vivien Campbell Patient Status:  Inpatient    1950 MRN [id number]   Location [facility] 4W/SW/SE Attending Brianne Lombardi MD   Hosp Day # 3 PCP No primary care provider on file.      Subj

## 2019-03-03 NOTE — PROGRESS NOTES
[place] [facility] - [facility]    Progress Note    Casper Cisse Patient Status:  Inpatient    1950 MRN [id number]   Location [facility] 4W/SW/SE Attending Valentina Gonzales MD   Hosp Day # 3 PCP No primary care provider on file.        Subj diphenhydrAMINE HCl, acetaminophen **OR** HYDROcodone-acetaminophen **OR** HYDROcodone-acetaminophen, morphINE sulfate **OR** morphINE sulfate **OR** morphINE sulfate    Results:     Lab Results   Component Value Date    WBC 7.5 03/03/2019    HGB 8.5 (L) 0 treatment plan and workup  Juan Beltran MD      **Certification      PHYSICIAN Certification of Need for Inpatient Hospitalization - Initial Certification    Patient will require inpatient services that will reasonably be expected to span two midnight

## 2019-03-03 NOTE — PHYSICAL THERAPY NOTE
PHYSICAL THERAPY KNEE TREATMENT NOTE - INPATIENT     Room Number: 330/508-P             Presenting Problem: R TKA    Problem List  Principal Problem:    Osteoarthritis of right knee  Active Problems:    Essential hypertension    Syncope      PHYSICAL THERA Weight Bearing as Tolerated       PAIN ASSESSMENT   Ratin  Location: R knee  Management Techniques: Activity promotion; Body mechanics; Relaxation;Repositioning    BALANCE  Static Sitting: Fair +  Dynamic Sitting: Fair  Static Standing: Poor +  Dynamic S goal is: to go to rehab   Goal #1 Patient is able to demonstrate supine - sit EOB @ level: modified independent     Goal #1   Current Status Mod A   Goal #2 Patient is able to demonstrate transfers Sit to/from Stand at assistance level: modified independen

## 2019-03-04 LAB
ANION GAP SERPL CALC-SCNC: 6 MMOL/L (ref 0–18)
BASOPHILS # BLD AUTO: 0.01 X10(3) UL (ref 0–0.2)
BASOPHILS NFR BLD AUTO: 0.2 %
BUN BLD-MCNC: 8 MG/DL (ref 7–18)
BUN/CREAT SERPL: 19 (ref 10–20)
CALCIUM BLD-MCNC: 7.5 MG/DL (ref 8.5–10.1)
CHLORIDE SERPL-SCNC: 110 MMOL/L (ref 98–107)
CO2 SERPL-SCNC: 25 MMOL/L (ref 21–32)
CREAT BLD-MCNC: 0.42 MG/DL (ref 0.55–1.02)
DEPRECATED RDW RBC AUTO: 45.6 FL (ref 35.1–46.3)
EOSINOPHIL # BLD AUTO: 0.29 X10(3) UL (ref 0–0.7)
EOSINOPHIL NFR BLD AUTO: 5.4 %
ERYTHROCYTE [DISTWIDTH] IN BLOOD BY AUTOMATED COUNT: 16.4 % (ref 11–15)
GLUCOSE BLD-MCNC: 91 MG/DL (ref 70–99)
HAV IGM SER QL: 2.4 MG/DL (ref 1.6–2.6)
HCT VFR BLD AUTO: 25.9 % (ref 35–48)
HGB BLD-MCNC: 8.2 G/DL (ref 12–16)
IMM GRANULOCYTES # BLD AUTO: 0.02 X10(3) UL (ref 0–1)
IMM GRANULOCYTES NFR BLD: 0.4 %
LYMPHOCYTES # BLD AUTO: 1.46 X10(3) UL (ref 1–4)
LYMPHOCYTES NFR BLD AUTO: 27.3 %
MCH RBC QN AUTO: 24.3 PG (ref 26–34)
MCHC RBC AUTO-ENTMCNC: 31.7 G/DL (ref 31–37)
MCV RBC AUTO: 76.9 FL (ref 80–100)
MONOCYTES # BLD AUTO: 0.43 X10(3) UL (ref 0.1–1)
MONOCYTES NFR BLD AUTO: 8.1 %
NEUTROPHILS # BLD AUTO: 3.13 X10 (3) UL (ref 1.5–7.7)
NEUTROPHILS # BLD AUTO: 3.13 X10(3) UL (ref 1.5–7.7)
NEUTROPHILS NFR BLD AUTO: 58.6 %
OSMOLALITY SERPL CALC.SUM OF ELEC: 290 MOSM/KG (ref 275–295)
PLATELET # BLD AUTO: 191 10(3)UL (ref 150–450)
POTASSIUM SERPL-SCNC: 3.9 MMOL/L (ref 3.5–5.1)
RBC # BLD AUTO: 3.37 X10(6)UL (ref 3.8–5.3)
SODIUM SERPL-SCNC: 141 MMOL/L (ref 136–145)
WBC # BLD AUTO: 5.3 X10(3) UL (ref 4–11)

## 2019-03-04 PROCEDURE — 99232 SBSQ HOSP IP/OBS MODERATE 35: CPT | Performed by: HOSPITALIST

## 2019-03-04 RX ORDER — MIDODRINE HYDROCHLORIDE 2.5 MG/1
2.5 TABLET ORAL 3 TIMES DAILY
Refills: 0 | Status: SHIPPED | COMMUNITY
Start: 2019-03-04 | End: 2020-09-30

## 2019-03-04 RX ORDER — AMLODIPINE BESYLATE 5 MG/1
5 TABLET ORAL DAILY
Status: DISCONTINUED | OUTPATIENT
Start: 2019-03-04 | End: 2019-03-06

## 2019-03-04 RX ORDER — PSEUDOEPHEDRINE HCL 30 MG
100 TABLET ORAL 2 TIMES DAILY
Refills: 0 | Status: SHIPPED | COMMUNITY
Start: 2019-03-04 | End: 2020-09-30

## 2019-03-04 RX ORDER — POLYETHYLENE GLYCOL 3350 17 G/17G
17 POWDER, FOR SOLUTION ORAL DAILY PRN
Refills: 0 | Status: SHIPPED | COMMUNITY
Start: 2019-03-04 | End: 2020-09-30

## 2019-03-04 NOTE — PROGRESS NOTES
Patient seen in follow up. Patient denies any chest pain or sob. No syncope.  BP high   03/04/19  1333   BP: 123/61   Pulse: 70   Resp: 16   Temp: 98.1 °F (36.7 °C)       Intake/Output Summary (Last 24 hours) at 3/4/2019 1339  Last data filed at 3/4/2 morphINE sulfate (PF) 2 MG/ML injection 2 mg 2 mg Intravenous Q2H PRN   Or      morphINE sulfate (PF) 4 MG/ML injection 4 mg 4 mg Intravenous Q2H PRN   Metoprolol Succinate ER (Toprol XL) 24 hr tab 100 mg 100 mg Oral Daily       Medications Prior to Admiss 3/2 I suspect during the episode of the syncope the patient did have some relative hypotension, and therefore the symptom of lightheadedness prior to the syncopal episode. The patient is receiving blood, which may help with volume status.   She did have chaudhry

## 2019-03-04 NOTE — PROGRESS NOTES
[place] [facility] - [facility]    Progress Note    Cristobal Peña Patient Status:  Inpatient    1950 MRN [id number]   Location [facility] 4W/SW/SE Attending Javier Tristan MD   Hosp Day # 4 PCP No primary care provider on file.      Subj

## 2019-03-04 NOTE — PHYSICAL THERAPY NOTE
PHYSICAL THERAPY KNEE TREATMENT NOTE - INPATIENT     Room Number: 659/071-R             Presenting Problem: R TKA    Problem List  Principal Problem:    Osteoarthritis of right knee  Active Problems:    Essential hypertension    Syncope      PHYSICAL THERA based on documentation in the [facility] '6 clicks' Inpatient Basic Mobility Short Form. Research supports that patients with this level of impairment may benefit from sub-acute rehab facility once medically cleared.     DISCHARGE RECOMMENDATIONS  PT Discharge 100'  Assistive Device: Rolling walker  Pattern: R Decreased stance time  Stoop/Curb Assistance: Not tested      Exercises AM Session PM Session   Ankle Pumps 0 reps 20 reps   Quad Sets 20 reps 20 reps   Glut Sets 20 reps 20 reps   Heel slides 20 reps 20 r

## 2019-03-04 NOTE — CM/SW NOTE
RUTHANN followed up with [employer] ins.auth. is pending. Liaison will call SW as soon as they get it. ADDENDUM    RUTHANN followed up with [employer] ins.auth. is still pending as of 2:55 pm on 3.4.19    Arlen Luevano LMSW., [id number]

## 2019-03-04 NOTE — OCCUPATIONAL THERAPY NOTE
OCCUPATIONAL THERAPY TREATMENT NOTE - INPATIENT    Room Number: 668/951-F         Presenting Problem: (R TKA )     Problem List  Principal Problem:    Osteoarthritis of right knee  Active Problems:    Essential hypertension    Syncope      OCCUPATIONAL THE right    WEIGHT BEARING RESTRICTION  Weight Bearing Restriction: R lower extremity        R Lower Extremity: Weight Bearing as Tolerated       PAIN ASSESSMENT  Rating: 3  Location: (RLE)  Management Techniques: Repositioning(Pt reports she had received mili standing at sink level with SBA. Comment: N/t     Patient will complete toileting with SBA. Comment: Progressing-min.  A             Goals  on: 2019  Frequency: 3-5x/week      BORIS Renteria/DELVIS

## 2019-03-04 NOTE — PLAN OF CARE
This RN took over care approximately 1500 this afternoon, assessed patient. [address] administered for pain control. Tele monitoring in place, vitals. Polar care applied. Resting comfortable in the chair at this time, eating dinner with family.

## 2019-03-04 NOTE — DISCHARGE SUMMARY
[facility]    Discharge Summary    Iline Goodman Patient Status:  Inpatient    1950 MRN [id number]   Location [facility] 4W/SW/SE Attending Tiffany Fofana MD   Hosp Day # 4 PCP No primary care provider on file. on 2/28/2019 at 14:36          Ct Brain Or Head (09798)    Result Date: 3/1/2019  CONCLUSION:  1. No acute intracranial finding. 2. Mild age-related changes of chronic small vessel disease in cerebral white matter.  3. Mild atherosclerotic calcification of Prescription details   docusate sodium 100 MG Caps  Commonly known as:  COLACE      Take 100 mg by mouth 2 (two) times daily.    Refills:  0     HYDROcodone-acetaminophen 5-325 MG Tabs  Commonly known as:  NORCO      Take 1-2 tablets by mouth every 4 (four) pain        ----------------------------------------------------  >30 MIN SPENT ON THIS DC   HARRIET ADLER  3/4/2019  1:17 PM

## 2019-03-04 NOTE — PAYOR COMM NOTE
--------------  CONTINUED STAY REVIEW    Payor: [employer] P[employer]  Subscriber #:  [id number]  Authorization Number: [id number]    Admit date: 2/28/19  Admit time: 80    Admitting Physician: Lynette Maldonado MD  Attending Physician:  Kyra Solis 145/68 98 % — None (Room air) —    03/03/19 1100 98.1 °F (36.7 °C) — 16 146/65 100 % — — — MB   03/03/19 1000 — — — 159/80 — — — — RM   03/03/19 0857 — 102 — 159/86 99 % — None (Room air) — [facility]         3/4/19 -     03/04/19 1333 98.1 °F (36.7 °C) 70 16 1

## 2019-03-05 PROCEDURE — 99232 SBSQ HOSP IP/OBS MODERATE 35: CPT | Performed by: HOSPITALIST

## 2019-03-05 RX ORDER — AMLODIPINE BESYLATE 5 MG/1
5 TABLET ORAL DAILY
Refills: 0 | Status: SHIPPED | COMMUNITY
Start: 2019-03-06 | End: 2020-09-30

## 2019-03-05 NOTE — PHYSICAL THERAPY NOTE
PHYSICAL THERAPY KNEE TREATMENT NOTE - INPATIENT     Room Number: 865/259-U             Presenting Problem: R TKA    Problem List  Principal Problem:    Osteoarthritis of right knee  Active Problems:    Essential hypertension    Syncope      PHYSICAL THERA back to sitting on the side of the bed?: A Little   How much help from another person does the patient currently need. ..   -   Moving to and from a bed to a chair (including a wheelchair)?: A Little   -   Need to walk in hospital room?: A [address] instructions provided in preparation for discharge.    Goal #6  Current Status Educated and performed am/pm

## 2019-03-05 NOTE — PLAN OF CARE
Altered Communication/Language Barrier    • Patient/Family is able to understand and participate in their care Not Progressing        DISCHARGE PLANNING    • Discharge to home or other facility with appropriate resources Not Progressing        HEMATOLOGIC

## 2019-03-05 NOTE — PAYOR COMM NOTE
--------------  CONTINUED STAY REVIEW    Payor: [employer] [facility] PPO  Subscriber #:  [id number]  Authorization Number: [id number]    Admit date: 2/28/19  Admit time: 6639  [id number] [id number] auth for [employer]    Please let us know if you need additional clin Q4H PRN   rivaroxaban (XARELTO) tab 10 mg 10 mg Oral Q24H   acetaminophen (TYLENOL) tab 650 mg 650 mg Oral Q4H PRN   Or         HYDROcodone-acetaminophen (NORCO) 5-325 MG per tab 1 tablet 1 tablet Oral Q4H PRN   Or         HYDROcodone-acetaminophen (NORCO) her temporarily on midodrine 2.5 mg 3 times daily. Debra Mccartney will place lower extremity stockings so that she is able to get her therapy.  The findings of the echocardiogram were discussed with the patient's family and the patient indirectly.  At this point, no

## 2019-03-05 NOTE — PROGRESS NOTES
[facility]    Progress Note    Davie Earing Patient Status:  Inpatient    1950 MRN [id number]   Location [facility] 4W/SW/SE Attending Te Jeong MD   Hosp Day # 5 PCP No primary care provider on file.      Subj

## 2019-03-05 NOTE — PROGRESS NOTES
[place] [facility]    Progress Note    Jamal Santiago Patient Status:  Inpatient    1950 MRN [id number]   Location [facility] 4W/SW/SE Attending Seble Lang MD   Hosp Day # 5 PCP No primary care provider on file.        Subj diphenhydrAMINE **OR** diphenhydrAMINE HCl, acetaminophen **OR** HYDROcodone-acetaminophen **OR** HYDROcodone-acetaminophen, morphINE sulfate **OR** morphINE sulfate **OR** morphINE sulfate    Results:     Lab Results   Component Value Date    WBC 5.3 03/0 inpatient services that will reasonably be expected to span two midnight's based on the clinical documentation in H+P. Based on patients current state of illness, I anticipate that, after discharge, patient will require TBD.

## 2019-03-05 NOTE — OCCUPATIONAL THERAPY NOTE
OCCUPATIONAL THERAPY TREATMENT NOTE - INPATIENT    Room Number: 138/785-U         Presenting Problem: (R TKA )     Problem List  Principal Problem:    Osteoarthritis of right knee  Active Problems:    Essential hypertension    Syncope      OCCUPATIONAL THE Putting on and taking off regular lower body clothing?: A Little  -   Bathing (including washing, rinsing, drying)?: A Little  -   Toileting, which includes using toilet, bedpan or urinal? : A Little  -   Putting on and taking off regular upper body clothi

## 2019-03-05 NOTE — PROGRESS NOTES
Patient seen in follow up. Patient denies any chest pain or sob. No syncope.  BP high   03/05/19  0800   BP: (!) 163/78   Pulse:    Resp:    Temp:        Intake/Output Summary (Last 24 hours) at 3/5/2019 1106  Last data filed at 3/5/2019 0358  Amisha bonilla Or      morphINE sulfate (PF) 2 MG/ML injection 2 mg 2 mg Intravenous Q2H PRN   Or      morphINE sulfate (PF) 4 MG/ML injection 4 mg 4 mg Intravenous Q2H PRN   Metoprolol Succinate ER (Toprol XL) 24 hr tab 100 mg 100 mg Oral Daily       Medications Prior t BP high I ll add norvasc 5    3/5  BP was high due to pain, we will continue monitor the BP      Thank you for allowing me to participate in the care of your patient. please call if you have any question.      Jessica Salinas MD   General Cardiology & Advanced

## 2019-03-05 NOTE — CM/SW NOTE
RUTHANN followed up with phil Hernandes.simeon. is still pending as of 3.5.19 11:00 am.    RUTHANN will continue to follow up.       Daniel Juares LMSW., [id number]

## 2019-03-06 VITALS
RESPIRATION RATE: 18 BRPM | OXYGEN SATURATION: 98 % | BODY MASS INDEX: 23.32 KG/M2 | HEIGHT: 65 IN | DIASTOLIC BLOOD PRESSURE: 61 MMHG | SYSTOLIC BLOOD PRESSURE: 130 MMHG | HEART RATE: 75 BPM | WEIGHT: 140 LBS | TEMPERATURE: 98 F

## 2019-03-06 PROCEDURE — 99239 HOSP IP/OBS DSCHRG MGMT >30: CPT | Performed by: HOSPITALIST

## 2019-03-06 NOTE — PAYOR COMM NOTE
--------------  CONTINUED STAY REVIEW    Payor: [employer]oon [employer]  Subscriber #:  [id number]  Authorization Number: [id number]    Admit date: 2/28/19  Admit time: 80    Admitting Physician: Wendy Alexis MD  Attending Physician:  Beckie Hurt

## 2019-03-06 NOTE — PHYSICAL THERAPY NOTE
PHYSICAL THERAPY KNEE TREATMENT NOTE - INPATIENT     Room Number: 103/747-D             Presenting Problem: R TKA    Problem List  Principal Problem:    Osteoarthritis of right knee  Active Problems:    Essential hypertension    Syncope      PHYSICAL THERA standing up from a chair with arms (e.g., wheelchair, bedside commode, etc.): A Little   -   Moving from lying on back to sitting on the side of the bed?: A Little   How much help from another person does the patient currently need. ..   -   Moving to and f Patient independently performs home exercise program for ROM/strengthening per the instructions provided in preparation for discharge.    Goal #6  Current Status Educated and performed am/pm

## 2019-03-06 NOTE — CM/SW NOTE
SW followed up with phil Hernandes.auth. is still pending. RUTHANN will continue to follow up.       Nelda Tripathi, SW., [id number]

## 2019-03-06 NOTE — PROGRESS NOTES
[place] [facility]    Progress Note    Madeleine Bigger Patient Status:  Inpatient    1950 MRN [id number]   Location [facility] 4W/SW/SE Attending Skeeter Jeans, MD   Hosp Day # 6 PCP No primary care provider on file.        Subj **OR** diphenhydrAMINE HCl, acetaminophen **OR** HYDROcodone-acetaminophen **OR** HYDROcodone-acetaminophen, morphINE sulfate **OR** morphINE sulfate **OR** morphINE sulfate    Results:     Lab Results   Component Value Date    WBC 5.3 03/04/2019    HGB 8. will reasonably be expected to span two midnight's based on the clinical documentation in H+P. Based on patients current state of illness, I anticipate that, after discharge, patient will require TBD.

## 2019-03-06 NOTE — CM/SW NOTE
Ins.auth. has been received. SW called pt's daughter Willie Collins ([phone]), a family member will transport her to rehab, she does not need Medi-car to transfer there today, 3.6. 19. Time set for  2 pm transfer. Report [phone].       Chanell Eisenberg

## 2019-03-07 ENCOUNTER — INITIAL APN SNF VISIT (OUTPATIENT)
Dept: INTERNAL MEDICINE CLINIC | Facility: SKILLED NURSING FACILITY | Age: 69
End: 2019-03-07

## 2019-03-07 VITALS
DIASTOLIC BLOOD PRESSURE: 82 MMHG | TEMPERATURE: 97 F | OXYGEN SATURATION: 98 % | SYSTOLIC BLOOD PRESSURE: 151 MMHG | BODY MASS INDEX: 24 KG/M2 | HEART RATE: 89 BPM | WEIGHT: 144.38 LBS | RESPIRATION RATE: 20 BRPM

## 2019-03-07 DIAGNOSIS — I10 ESSENTIAL HYPERTENSION: Chronic | ICD-10-CM

## 2019-03-07 DIAGNOSIS — M17.11 PRIMARY OSTEOARTHRITIS OF RIGHT KNEE: ICD-10-CM

## 2019-03-07 PROCEDURE — 99310 SBSQ NF CARE HIGH MDM 45: CPT | Performed by: NURSE PRACTITIONER

## 2019-03-07 NOTE — PROGRESS NOTES
Arjuanita Conway  : 1950  Age 70year old  female patient is admitted to [place] [address] for rehabilitation and strengthening.       Chief complaint: Right knee pain    HPI  Admitted to [facility] for with h/o severe osteoarthritis of the righ Metoprolol Succinate  MG Oral Tablet 24 Hr Take 100 mg by mouth daily. Disp:  Rfl:    Vitamin B-12 100 MCG Oral Tab Take 5,000 mcg by mouth once a week.  Disp:  Rfl:        VITALS:  /82   Pulse 89   Temp 97.2 °F (36.2 °C)   Resp 20   Wt 144 lb prophylaxis  - wound care    Hypertension  - Qshift vitals  - continue medications  - monitor    Vasovagal episode at [address] [facility]  - Remote tele in hospital  - EKG, negative  - CT brain, negative  - Echo, negative  - cardiology to follow in rehab if needed  - Comp

## 2019-03-08 ENCOUNTER — SNF VISIT (OUTPATIENT)
Dept: INTERNAL MEDICINE CLINIC | Facility: SKILLED NURSING FACILITY | Age: 69
End: 2019-03-08

## 2019-03-08 VITALS
TEMPERATURE: 97 F | DIASTOLIC BLOOD PRESSURE: 72 MMHG | SYSTOLIC BLOOD PRESSURE: 152 MMHG | BODY MASS INDEX: 24 KG/M2 | OXYGEN SATURATION: 99 % | RESPIRATION RATE: 20 BRPM | HEART RATE: 80 BPM | WEIGHT: 144 LBS

## 2019-03-08 DIAGNOSIS — I10 ESSENTIAL HYPERTENSION: Chronic | ICD-10-CM

## 2019-03-08 DIAGNOSIS — M17.11 PRIMARY OSTEOARTHRITIS OF RIGHT KNEE: ICD-10-CM

## 2019-03-08 PROCEDURE — 1124F ACP DISCUSS-NO DSCNMKR DOCD: CPT | Performed by: NURSE PRACTITIONER

## 2019-03-08 PROCEDURE — 99308 SBSQ NF CARE LOW MDM 20: CPT | Performed by: NURSE PRACTITIONER

## 2019-03-08 NOTE — PAYOR COMM NOTE
--------------  DISCHARGE REVIEW    [facility]  Subscriber #:  [id number]  Authorization Number: [id number]    Admit date: 2/28/19  Admit time:  6191  Discharge Date: 3/6/2019  1:49 PM     Admitting Physician: Eduarda Braswell MD 2/28/2019 at 14:34     Approved by (CST): Denny Diallo MD on 2/28/2019 at 14:36          Ct Brain Or Head ([id number])    Result Date: 3/1/2019  CONCLUSION:  1. No acute intracranial finding.  2. Mild age-related changes of chronic small vessel disease in c medications      Instructions Prescription details   docusate sodium 100 MG Caps  Commonly known as:  COLACE      Take 100 mg by mouth 2 (two) times daily.    Refills:  0     HYDROcodone-acetaminophen 5-325 MG Tabs  Commonly known as:  NORCO      Take 1-2 t

## 2019-03-08 NOTE — PROGRESS NOTES
Deila Party  : 1950  Age 70year old  female patient is admitted to [place] [address] for rehabilitation and strengthening       Chief complaint: Right knee pain    HPI   Admitted to [facility], for with h/o severe osteoarthritis of the ri normal active BS+, soft, nondistended; no organomegaly, no masses; no bruits; nontender, no guarding, no rebound tenderness.   LYMPHATIC:no lymphedema  MUSCULOSKELETAL: no acute synovitis upper or lower extremity  EXTREMITIES/VASCULAR:no cyanosis, clubbing

## 2019-03-12 ENCOUNTER — SNF VISIT (OUTPATIENT)
Dept: INTERNAL MEDICINE CLINIC | Facility: SKILLED NURSING FACILITY | Age: 69
End: 2019-03-12

## 2019-03-12 VITALS
TEMPERATURE: 97 F | SYSTOLIC BLOOD PRESSURE: 172 MMHG | OXYGEN SATURATION: 99 % | BODY MASS INDEX: 24 KG/M2 | HEART RATE: 80 BPM | DIASTOLIC BLOOD PRESSURE: 77 MMHG | RESPIRATION RATE: 20 BRPM | WEIGHT: 144 LBS

## 2019-03-12 DIAGNOSIS — M17.11 PRIMARY OSTEOARTHRITIS OF RIGHT KNEE: ICD-10-CM

## 2019-03-12 DIAGNOSIS — I10 ESSENTIAL HYPERTENSION: Chronic | ICD-10-CM

## 2019-03-12 PROCEDURE — 99308 SBSQ NF CARE LOW MDM 20: CPT | Performed by: NURSE PRACTITIONER

## 2019-03-12 NOTE — PROGRESS NOTES
Kamala Dial  : 1950  Age 70year old  female patient is admitted to [place] [address] for rehabilitation and strengthening       Chief complaint: Right knee pain    HPI   Admitted to [facility], for with h/o severe osteoarthritis of the ri rebound tenderness.   LYMPHATIC:no lymphedema  MUSCULOSKELETAL: no acute synovitis upper or lower extremity  EXTREMITIES/VASCULAR:no cyanosis, clubbing or edema No pitting edema, minimal swelling  NEUROLOGIC: intact; no sensorimotor deficit  PSYCHIATRIC: al

## 2019-03-13 ENCOUNTER — SNF VISIT (OUTPATIENT)
Dept: INTERNAL MEDICINE CLINIC | Facility: SKILLED NURSING FACILITY | Age: 69
End: 2019-03-13

## 2019-03-13 VITALS
HEART RATE: 80 BPM | DIASTOLIC BLOOD PRESSURE: 73 MMHG | BODY MASS INDEX: 24 KG/M2 | SYSTOLIC BLOOD PRESSURE: 124 MMHG | OXYGEN SATURATION: 96 % | TEMPERATURE: 97 F | RESPIRATION RATE: 20 BRPM | WEIGHT: 144 LBS

## 2019-03-13 DIAGNOSIS — M17.11 PRIMARY OSTEOARTHRITIS OF RIGHT KNEE: ICD-10-CM

## 2019-03-13 DIAGNOSIS — I10 ESSENTIAL HYPERTENSION: Chronic | ICD-10-CM

## 2019-03-13 PROCEDURE — 99307 SBSQ NF CARE SF MDM 10: CPT | Performed by: NURSE PRACTITIONER

## 2019-03-13 NOTE — PROGRESS NOTES
Elvis Brooks  : 1950  Age 70year old  female patient is admitted to [place] [address] for rehabilitation and strengthening       Chief complaint: Right knee pain    HPI   Admitted to [facility], for with h/o severe osteoarthritis of the ri nondistended; no organomegaly, no masses; no bruits; nontender, no guarding, no rebound tenderness.   LYMPHATIC:no lymphedema  MUSCULOSKELETAL: no acute synovitis upper or lower extremity  EXTREMITIES/VASCULAR:no cyanosis, clubbing or edema No pitting edema

## 2019-03-14 ENCOUNTER — SNF VISIT (OUTPATIENT)
Dept: INTERNAL MEDICINE CLINIC | Facility: SKILLED NURSING FACILITY | Age: 69
End: 2019-03-14

## 2019-03-14 VITALS
TEMPERATURE: 97 F | DIASTOLIC BLOOD PRESSURE: 65 MMHG | RESPIRATION RATE: 20 BRPM | SYSTOLIC BLOOD PRESSURE: 138 MMHG | OXYGEN SATURATION: 97 % | HEART RATE: 66 BPM

## 2019-03-14 DIAGNOSIS — I10 ESSENTIAL HYPERTENSION: Chronic | ICD-10-CM

## 2019-03-14 DIAGNOSIS — M17.11 PRIMARY OSTEOARTHRITIS OF RIGHT KNEE: ICD-10-CM

## 2019-03-14 PROCEDURE — 99308 SBSQ NF CARE LOW MDM 20: CPT | Performed by: NURSE PRACTITIONER

## 2019-03-14 NOTE — PROGRESS NOTES
Davie Earing  : 1950  Age 70year old  female patient is admitted to [place] [address] for rehabilitation and strengthening       Chief complaint: Right knee pain     HPI   Admitted to [facility],  for with h/o severe osteoarthritis of the lymphedema  MUSCULOSKELETAL: no acute synovitis upper or lower extremity  EXTREMITIES/VASCULAR:no cyanosis, clubbing or edema No pitting edema, minimal swelling  NEUROLOGIC: intact; no sensorimotor deficit  PSYCHIATRIC: alert and oriented x 3; affect appro

## 2019-04-02 ENCOUNTER — OFFICE VISIT (OUTPATIENT)
Dept: PHYSICAL THERAPY | Age: 69
End: 2019-04-02
Attending: ORTHOPAEDIC SURGERY
Payer: COMMERCIAL

## 2019-04-02 PROCEDURE — 97110 THERAPEUTIC EXERCISES: CPT

## 2019-04-02 PROCEDURE — 97161 PT EVAL LOW COMPLEX 20 MIN: CPT

## 2019-04-02 NOTE — PROGRESS NOTES
ISELA EVALUATION:   Referring Physician: Dr. Julio Patel  Diagnosis: R total knee arthroplasty  Date of Onset: Feb 28, 2019 Date of Service: 4/2/2019     PATIENT SUMMARY   Alvin Craig is a 70year old y/o female who presents to therapy today s/p right to received there ex, HEP  Charges: PT Bennett, TE 2     Total Timed Treatment: 45 min     Total Treatment Time: 45 min      PT Bennett Low Complexity     PLAN OF CARE:    Goals:    1- Pt will be I with maintenance and progression of HEP  2- Pt will demo increase i

## 2019-04-04 ENCOUNTER — OFFICE VISIT (OUTPATIENT)
Dept: PHYSICAL THERAPY | Age: 69
End: 2019-04-04
Attending: ORTHOPAEDIC SURGERY
Payer: COMMERCIAL

## 2019-04-04 PROCEDURE — 97110 THERAPEUTIC EXERCISES: CPT

## 2019-04-04 NOTE — PROGRESS NOTES
Diagnosis: R total knee arthroplasty  Date of Onset: Feb 28, 2019        Next MD visit: none scheduled  Fall Risk: standard         Precautions: n/a          Medication Changes since last visit?: No    Visit#: 2 [employer] PPO    Subjective: Patient reports 2/10

## 2019-04-10 ENCOUNTER — OFFICE VISIT (OUTPATIENT)
Dept: PHYSICAL THERAPY | Age: 69
End: 2019-04-10
Attending: ORTHOPAEDIC SURGERY
Payer: COMMERCIAL

## 2019-04-10 PROCEDURE — 97110 THERAPEUTIC EXERCISES: CPT

## 2019-04-10 NOTE — PROGRESS NOTES
Diagnosis: R total knee arthroplasty  Date of Onset: Feb 28, 2019        Next MD visit: none scheduled  Fall Risk: standard         Precautions: n/a          Medication Changes since last visit?: No    Visit#: 3 [employer] PPO    Subjective: Patient reports 1/10

## 2019-04-19 ENCOUNTER — OFFICE VISIT (OUTPATIENT)
Dept: PHYSICAL THERAPY | Age: 69
End: 2019-04-19
Attending: ORTHOPAEDIC SURGERY
Payer: COMMERCIAL

## 2019-04-19 PROCEDURE — 97110 THERAPEUTIC EXERCISES: CPT

## 2019-04-23 ENCOUNTER — OFFICE VISIT (OUTPATIENT)
Dept: PHYSICAL THERAPY | Age: 69
End: 2019-04-23
Attending: ORTHOPAEDIC SURGERY
Payer: COMMERCIAL

## 2019-04-23 PROCEDURE — 97110 THERAPEUTIC EXERCISES: CPT

## 2019-04-23 NOTE — PROGRESS NOTES
Diagnosis: R total knee arthroplasty  Date of Onset: Feb 28, 2019        Next MD visit: ?  Fall Risk: standard         Precautions: n/a          Medication Changes since last visit?: No    Visit#: 5 [employer] PPO    Subjective: Patient reports 0/10 R knee pain

## 2019-04-25 ENCOUNTER — OFFICE VISIT (OUTPATIENT)
Dept: PHYSICAL THERAPY | Age: 69
End: 2019-04-25
Attending: ORTHOPAEDIC SURGERY
Payer: COMMERCIAL

## 2019-04-25 PROCEDURE — 97110 THERAPEUTIC EXERCISES: CPT

## 2019-04-25 NOTE — PROGRESS NOTES
Diagnosis: R total knee arthroplasty  Date of Onset: Feb 28, 2019        Next MD visit: ?  Fall Risk: standard         Precautions: n/a          Medication Changes since last visit?: No    Visit#: 6 [employer] PPO    Subjective: Patient reports 0/10 R knee pain pt to negotiate stairs without UE support  4- Pt will report 0/10 knee pain with standing, ambulation, and stair negotiation    Plan: Reassess patient symptoms.      Charges: EX 3       Total Timed Treatment: 45min  Total Treatment Time: 45 min

## 2019-04-30 ENCOUNTER — OFFICE VISIT (OUTPATIENT)
Dept: PHYSICAL THERAPY | Age: 69
End: 2019-04-30
Attending: ORTHOPAEDIC SURGERY
Payer: COMMERCIAL

## 2019-04-30 PROCEDURE — 97110 THERAPEUTIC EXERCISES: CPT

## 2019-04-30 NOTE — PROGRESS NOTES
Diagnosis: R total knee arthroplasty  Date of Onset: Feb 28, 2019        Next MD visit: ?  Fall Risk: standard         Precautions: n/a          Medication Changes since last visit?: No    Visit#: 7 [employer] PPO    Subjective: Patient reports 0/10 R knee pain standing, ambulation, and stair negotiation    Plan: Reassess patient symptoms.      Charges: EX 3       Total Timed Treatment: 45min  Total Treatment Time: 45 min

## 2019-05-01 NOTE — ADDENDUM NOTE
Addendum  created 05/01/19 0810 by Jacque Howell MD    Intraprocedure Blocks edited, Sign clinical note

## 2019-05-02 ENCOUNTER — OFFICE VISIT (OUTPATIENT)
Dept: PHYSICAL THERAPY | Age: 69
End: 2019-05-02
Attending: ORTHOPAEDIC SURGERY
Payer: COMMERCIAL

## 2019-05-02 PROCEDURE — 97110 THERAPEUTIC EXERCISES: CPT

## 2019-05-02 NOTE — PROGRESS NOTES
Diagnosis: R total knee arthroplasty  Date of Onset: Feb 28, 2019        Next MD visit: ?  Fall Risk: standard         Precautions: n/a          Medication Changes since last visit?: No    Visit#: 8 [employer] PPO    Subjective: Patient reports 0/10 R knee pain negotiation    Plan: Reassess patient symptoms.      Charges: EX 3       Total Timed Treatment: 45min  Total Treatment Time: 45 min

## 2019-05-07 ENCOUNTER — OFFICE VISIT (OUTPATIENT)
Dept: PHYSICAL THERAPY | Age: 69
End: 2019-05-07
Attending: ORTHOPAEDIC SURGERY
Payer: COMMERCIAL

## 2019-05-07 PROCEDURE — 97110 THERAPEUTIC EXERCISES: CPT

## 2019-05-07 NOTE — PROGRESS NOTES
Diagnosis: R total knee arthroplasty  Date of Onset: Feb 28, 2019        Next MD visit: ?  Fall Risk: standard         Precautions: n/a          Medication Changes since last visit?: No    Visit#: 8 [employer] PPO    Subjective: Patient reports 0/10 R knee pain support  4- Pt will report 0/10 knee pain with standing, ambulation, and stair negotiation    Plan: Reassess patient symptoms.      Charges: EX 3       Total Timed Treatment: 40 min  Total Treatment Time: 40 min

## 2019-05-09 ENCOUNTER — OFFICE VISIT (OUTPATIENT)
Dept: PHYSICAL THERAPY | Age: 69
End: 2019-05-09
Attending: ORTHOPAEDIC SURGERY
Payer: COMMERCIAL

## 2019-05-09 PROCEDURE — 97110 THERAPEUTIC EXERCISES: CPT

## 2019-05-09 NOTE — PROGRESS NOTES
Diagnosis: R total knee arthroplasty  Date of Onset: Feb 28, 2019        Next MD visit: ?  Fall Risk: standard         Precautions: n/a          Medication Changes since last visit?: No    Visit#: 9 [employer] PPO    Subjective: Patient reports 0/10 R knee pain MET  4- Pt will report 0/10 knee pain with standing, ambulation, and stair negotiation   NEARLY MET    Plan: Possible discharge in next 1-3 visits with any updated HEP.      Charges: EX 2       Total Timed Treatment: 35 min  Total Treatment Time: 35 min

## 2019-05-14 ENCOUNTER — OFFICE VISIT (OUTPATIENT)
Dept: PHYSICAL THERAPY | Age: 69
End: 2019-05-14
Attending: ORTHOPAEDIC SURGERY
Payer: COMMERCIAL

## 2019-05-14 PROCEDURE — 97110 THERAPEUTIC EXERCISES: CPT

## 2019-05-14 NOTE — PROGRESS NOTES
Diagnosis: R total knee arthroplasty  Date of Onset: Feb 28, 2019        Next MD visit: ?  Fall Risk: standard         Precautions: n/a          Medication Changes since last visit?: No    Visit#: 10 [employer] PPO    Assessment:  Patient seen for a total of 10 pt to negotiate stairs without UE support  NEARLY MET  4- Pt will report 0/10 knee pain with standing, ambulation, and stair negotiation   MET    Plan: Discharge PT    Charges: EX 3      Total Timed Treatment: 45 min  Total Treatment Time: 45 min

## 2019-05-16 ENCOUNTER — APPOINTMENT (OUTPATIENT)
Dept: PHYSICAL THERAPY | Age: 69
End: 2019-05-16
Attending: ORTHOPAEDIC SURGERY
Payer: COMMERCIAL

## 2019-05-21 ENCOUNTER — APPOINTMENT (OUTPATIENT)
Dept: PHYSICAL THERAPY | Age: 69
End: 2019-05-21
Attending: ORTHOPAEDIC SURGERY
Payer: COMMERCIAL

## 2019-12-21 ENCOUNTER — LAB ENCOUNTER (OUTPATIENT)
Dept: LAB | Age: 69
End: 2019-12-21
Attending: INTERNAL MEDICINE
Payer: COMMERCIAL

## 2019-12-21 DIAGNOSIS — I10 HYPERTENSION, ESSENTIAL: Primary | ICD-10-CM

## 2019-12-21 PROCEDURE — 36415 COLL VENOUS BLD VENIPUNCTURE: CPT

## 2019-12-21 PROCEDURE — 80061 LIPID PANEL: CPT

## 2019-12-21 PROCEDURE — 80048 BASIC METABOLIC PNL TOTAL CA: CPT

## 2020-09-30 ENCOUNTER — OFFICE VISIT (OUTPATIENT)
Dept: FAMILY MEDICINE CLINIC | Facility: CLINIC | Age: 70
End: 2020-09-30
Payer: MEDICARE

## 2020-09-30 VITALS
HEART RATE: 67 BPM | BODY MASS INDEX: 25.33 KG/M2 | TEMPERATURE: 98 F | DIASTOLIC BLOOD PRESSURE: 67 MMHG | SYSTOLIC BLOOD PRESSURE: 129 MMHG | HEIGHT: 65 IN | WEIGHT: 152 LBS

## 2020-09-30 DIAGNOSIS — H91.93 BILATERAL HEARING LOSS, UNSPECIFIED HEARING LOSS TYPE: ICD-10-CM

## 2020-09-30 DIAGNOSIS — I10 ESSENTIAL HYPERTENSION: ICD-10-CM

## 2020-09-30 DIAGNOSIS — I05.0 MITRAL VALVE STENOSIS, UNSPECIFIED ETIOLOGY: ICD-10-CM

## 2020-09-30 DIAGNOSIS — I35.0 AORTIC VALVE STENOSIS, ETIOLOGY OF CARDIAC VALVE DISEASE UNSPECIFIED: ICD-10-CM

## 2020-09-30 DIAGNOSIS — Z12.31 VISIT FOR SCREENING MAMMOGRAM: ICD-10-CM

## 2020-09-30 DIAGNOSIS — Z13.5 SCREENING FOR GLAUCOMA: Primary | ICD-10-CM

## 2020-09-30 DIAGNOSIS — E55.9 VITAMIN D DEFICIENCY: ICD-10-CM

## 2020-09-30 PROCEDURE — G0008 ADMIN INFLUENZA VIRUS VAC: HCPCS | Performed by: FAMILY MEDICINE

## 2020-09-30 PROCEDURE — 3078F DIAST BP <80 MM HG: CPT | Performed by: FAMILY MEDICINE

## 2020-09-30 PROCEDURE — 99204 OFFICE O/P NEW MOD 45 MIN: CPT | Performed by: FAMILY MEDICINE

## 2020-09-30 PROCEDURE — 3074F SYST BP LT 130 MM HG: CPT | Performed by: FAMILY MEDICINE

## 2020-09-30 PROCEDURE — 90662 IIV NO PRSV INCREASED AG IM: CPT | Performed by: FAMILY MEDICINE

## 2020-09-30 PROCEDURE — 3008F BODY MASS INDEX DOCD: CPT | Performed by: FAMILY MEDICINE

## 2020-09-30 RX ORDER — ALENDRONATE SODIUM 70 MG/1
70 TABLET ORAL WEEKLY
Qty: 12 TABLET | Refills: 4 | Status: SHIPPED | OUTPATIENT
Start: 2020-09-30 | End: 2021-01-12

## 2020-09-30 RX ORDER — LOSARTAN POTASSIUM 50 MG/1
50 TABLET ORAL DAILY
Qty: 90 TABLET | Refills: 4 | Status: SHIPPED | OUTPATIENT
Start: 2020-09-30 | End: 2021-09-23

## 2020-09-30 RX ORDER — ERGOCALCIFEROL (VITAMIN D2) 1250 MCG
1 CAPSULE ORAL WEEKLY
COMMUNITY
End: 2020-09-30

## 2020-09-30 RX ORDER — ERGOCALCIFEROL (VITAMIN D2) 1250 MCG
50000 CAPSULE ORAL WEEKLY
Qty: 12 CAPSULE | Refills: 4 | Status: SHIPPED | OUTPATIENT
Start: 2020-09-30 | End: 2021-09-23

## 2020-09-30 RX ORDER — BUMETANIDE 1 MG/1
1 TABLET ORAL DAILY
COMMUNITY

## 2020-09-30 RX ORDER — METOPROLOL SUCCINATE 100 MG/1
100 TABLET, EXTENDED RELEASE ORAL DAILY
Qty: 90 TABLET | Refills: 4 | Status: SHIPPED | OUTPATIENT
Start: 2020-09-30 | End: 2021-09-23

## 2020-09-30 RX ORDER — AMLODIPINE BESYLATE 5 MG/1
5 TABLET ORAL DAILY
Qty: 90 TABLET | Refills: 4 | Status: SHIPPED | OUTPATIENT
Start: 2020-09-30 | End: 2021-09-23

## 2020-09-30 RX ORDER — ALENDRONATE SODIUM 70 MG/1
1 TABLET ORAL WEEKLY
COMMUNITY
End: 2020-09-30

## 2020-09-30 RX ORDER — LOSARTAN POTASSIUM 50 MG/1
1 TABLET ORAL DAILY
COMMUNITY
End: 2020-09-30

## 2020-09-30 NOTE — PROGRESS NOTES
Armando Newell is a 78year old female. Patient presents with:  Medication Follow-Up: Refills on medications  Anemia: discuss lab order  Referral: Cardio and Ophtalmology    HPI:   New to me.    Reports last colonoscopy 2011 and normal. Reports told anemi GENERAL: well developed, well nourished,in no apparent distress  SKIN: no rashes,no suspicious lesions  NECK: supple,no adenopathy,no bruits  LUNGS: clear to auscultation  CARDIO: RRR with 2/6 YOAN murmur  EXTREMITIES: no cyanosis, clubbing or edema

## 2020-10-06 ENCOUNTER — LAB ENCOUNTER (OUTPATIENT)
Dept: LAB | Age: 70
End: 2020-10-06
Attending: FAMILY MEDICINE
Payer: MEDICARE

## 2020-10-06 DIAGNOSIS — E55.9 VITAMIN D DEFICIENCY: ICD-10-CM

## 2020-10-06 DIAGNOSIS — I10 ESSENTIAL HYPERTENSION: ICD-10-CM

## 2020-10-06 PROCEDURE — 80061 LIPID PANEL: CPT

## 2020-10-06 PROCEDURE — 36415 COLL VENOUS BLD VENIPUNCTURE: CPT

## 2020-10-06 PROCEDURE — 82306 VITAMIN D 25 HYDROXY: CPT

## 2020-10-06 PROCEDURE — 80053 COMPREHEN METABOLIC PANEL: CPT

## 2020-10-06 PROCEDURE — 85025 COMPLETE CBC W/AUTO DIFF WBC: CPT

## 2020-10-06 PROCEDURE — 84443 ASSAY THYROID STIM HORMONE: CPT

## 2020-10-11 NOTE — PROGRESS NOTES
Labs are good except for increasing cholesterol. Will discuss at next visit if should start medications. In the mean time, should increase exercise and improve diet. Can also take fish oil up to 4000 mg a day.

## 2020-10-20 ENCOUNTER — HOSPITAL ENCOUNTER (OUTPATIENT)
Dept: MAMMOGRAPHY | Age: 70
Discharge: HOME | End: 2020-10-20
Attending: FAMILY MEDICINE
Payer: MEDICARE

## 2020-10-20 DIAGNOSIS — Z12.31 VISIT FOR SCREENING MAMMOGRAM: ICD-10-CM

## 2020-10-20 PROCEDURE — 77067 SCR MAMMO BI INCL CAD: CPT | Performed by: FAMILY MEDICINE

## 2020-10-20 PROCEDURE — 77063 BREAST TOMOSYNTHESIS BI: CPT | Performed by: FAMILY MEDICINE

## 2020-12-01 ENCOUNTER — OFFICE VISIT (OUTPATIENT)
Dept: FAMILY MEDICINE CLINIC | Facility: CLINIC | Age: 70
End: 2020-12-01
Payer: MEDICARE

## 2020-12-01 VITALS
BODY MASS INDEX: 25.49 KG/M2 | HEIGHT: 65 IN | HEART RATE: 79 BPM | WEIGHT: 153 LBS | SYSTOLIC BLOOD PRESSURE: 131 MMHG | TEMPERATURE: 98 F | DIASTOLIC BLOOD PRESSURE: 75 MMHG

## 2020-12-01 DIAGNOSIS — M85.80 OSTEOPENIA, UNSPECIFIED LOCATION: ICD-10-CM

## 2020-12-01 DIAGNOSIS — Z00.00 ENCOUNTER FOR ANNUAL HEALTH EXAMINATION: ICD-10-CM

## 2020-12-01 DIAGNOSIS — R22.31 MASS OF RIGHT HAND: ICD-10-CM

## 2020-12-01 DIAGNOSIS — E78.5 HYPERLIPIDEMIA, UNSPECIFIED HYPERLIPIDEMIA TYPE: ICD-10-CM

## 2020-12-01 DIAGNOSIS — I10 ESSENTIAL HYPERTENSION: Primary | Chronic | ICD-10-CM

## 2020-12-01 DIAGNOSIS — M17.11 PRIMARY OSTEOARTHRITIS OF RIGHT KNEE: ICD-10-CM

## 2020-12-01 DIAGNOSIS — I05.0 MITRAL VALVE STENOSIS, UNSPECIFIED ETIOLOGY: ICD-10-CM

## 2020-12-01 DIAGNOSIS — L98.9 SKIN LESION OF FACE: ICD-10-CM

## 2020-12-01 DIAGNOSIS — Z78.0 ASYMPTOMATIC MENOPAUSAL STATE: ICD-10-CM

## 2020-12-01 PROBLEM — M19.90 OSTEOARTHRITIS: Status: RESOLVED | Noted: 2019-02-28 | Resolved: 2020-12-01

## 2020-12-01 PROBLEM — D69.6 THROMBOCYTOPENIA: Chronic | Status: ACTIVE | Noted: 2020-12-01

## 2020-12-01 PROBLEM — D69.6 THROMBOCYTOPENIA (HCC): Chronic | Status: ACTIVE | Noted: 2020-12-01

## 2020-12-01 PROCEDURE — 90670 PCV13 VACCINE IM: CPT | Performed by: FAMILY MEDICINE

## 2020-12-01 PROCEDURE — 96160 PT-FOCUSED HLTH RISK ASSMT: CPT | Performed by: FAMILY MEDICINE

## 2020-12-01 PROCEDURE — G0439 PPPS, SUBSEQ VISIT: HCPCS | Performed by: FAMILY MEDICINE

## 2020-12-01 PROCEDURE — G0009 ADMIN PNEUMOCOCCAL VACCINE: HCPCS | Performed by: FAMILY MEDICINE

## 2020-12-01 PROCEDURE — 99397 PER PM REEVAL EST PAT 65+ YR: CPT | Performed by: FAMILY MEDICINE

## 2020-12-01 RX ORDER — ATORVASTATIN CALCIUM 40 MG/1
TABLET, FILM COATED ORAL
COMMUNITY
End: 2021-09-23

## 2020-12-01 NOTE — PROGRESS NOTES
HPI:   Mel Glynn is a 78year old female who presents for a Medicare Subsequent Annual Wellness visit (Pt already had Initial Annual Wellness).     Pt here for regular physical. Trying to eat healthy and some walking. '  Saw the cardiologist   Ole Cassie Lechuga MD as PCP - General (Family Medicine)    Patient Active Problem List:     Osteoarthritis of right knee     Essential hypertension    Wt Readings from Last 3 Encounters:  12/01/20 : 153 lb (69.4 kg)  09/30/20 : 152 lb (68.9 kg)  03/13/19 : 144 l tobacco. She reports that she does not drink alcohol or use drugs.      REVIEW OF SYSTEMS:   GENERAL: feels well otherwise  SKIN: pos skin lesions  EYES: denies blurred vision or double vision  HEENT: denies nasal congestion, sinus pain or ST  LUNGS: denies because I misunderstand what they say: Yes   I misunderstand what others are saying and make inappropriate responses: Yes I avoid social activities because I cannot hear well and fear I will reply improperly: Yes   Family members and friends have told me t Fluvirin, 3 Years & >, Im 11/14/2015, 09/17/2016, 10/14/2017        ASSESSMENT AND OTHER RELEVANT CHRONIC CONDITIONS:   Cristobal Peña is a 78year old female who presents for a Medicare Assessment. PLAN SUMMARY:   1.  Essential hypertension  BP stabl Date Value   10/06/2020 135 (H)        EKG - w/ Initial Preventative Physical Exam only, or if medically necessary Electrocardiogram date03/02/2019       Colorectal Cancer Screening      Colonoscopy Screen every 10 years Colonoscopy due on 12/29/1999 Upd cut with metal- TD and TDaP Not covered by Medicare Part B No vaccine history found This may be covered with your prescription benefits, but Medicare does not cover unless Medically needed    Zoster  Not covered by Medicare Part B No vaccine history found

## 2020-12-01 NOTE — PATIENT INSTRUCTIONS
Narcisa Teague's SCREENING SCHEDULE   Tests on this list are recommended by your physician but may not be covered, or covered at this frequency, by your insurer. Please check with your insurance carrier before scheduling to verify coverage.    LESLY Covered every 10 years- more often if abnormal Colonoscopy due on 12/29/1999 Update [facility] if applicable    Flex Sigmoidoscopy Screen  Covered every 5 years No results found for this or any previous visit. No flowsheet data found.      Fecal O Pneumococcal 23 (Pneumovax)  Covered Once after 65 No orders found for this or any previous visit. Please get once after your 65th birthday    Hepatitis B for Moderate/High Risk       No orders found for this or any previous visit.  Medium/high risk factors

## 2021-01-06 ENCOUNTER — HOSPITAL ENCOUNTER (OUTPATIENT)
Dept: BONE DENSITY | Age: 71
Discharge: HOME | End: 2021-01-06
Attending: FAMILY MEDICINE
Payer: MEDICARE

## 2021-01-06 DIAGNOSIS — M85.80 OSTEOPENIA, UNSPECIFIED LOCATION: ICD-10-CM

## 2021-01-06 DIAGNOSIS — Z78.0 ASYMPTOMATIC MENOPAUSAL STATE: ICD-10-CM

## 2021-01-06 PROCEDURE — 77080 DXA BONE DENSITY AXIAL: CPT | Performed by: FAMILY MEDICINE

## 2021-01-12 ENCOUNTER — TELEPHONE (OUTPATIENT)
Dept: FAMILY MEDICINE CLINIC | Facility: CLINIC | Age: 71
End: 2021-01-12

## 2021-01-12 NOTE — PROGRESS NOTES
Bones look good. Just mild osteopenia in spine with mild increased fracture risk. Ok to stop the fosamax and re-evaluate it in 2 years.

## 2021-01-12 NOTE — TELEPHONE ENCOUNTER
Spoke with pt via [facility] ID # [id number],  verified  Pt informed of dexascan result & MD recommendation, pt stated understanding. Med list updated. Les Gomez MD   2021 10:29 AM      Bones look good.  Just mild osteopenia

## 2021-02-01 ENCOUNTER — OFFICE VISIT (OUTPATIENT)
Dept: SURGERY | Facility: CLINIC | Age: 71
End: 2021-02-01
Payer: MEDICARE

## 2021-02-01 DIAGNOSIS — D21.11 BENIGN NEOPLASM OF CONNECTIVE AND OTHER SOFT TISSUE OF RIGHT UPPER LIMB, INCLUDING SHOULDER: Primary | ICD-10-CM

## 2021-02-01 PROCEDURE — 99204 OFFICE O/P NEW MOD 45 MIN: CPT | Performed by: PLASTIC SURGERY

## 2021-02-01 RX ORDER — HYDROCODONE BITARTRATE AND ACETAMINOPHEN 7.5; 325 MG/1; MG/1
1 TABLET ORAL
Qty: 15 TABLET | Refills: 0 | Status: SHIPPED | OUTPATIENT
Start: 2021-02-01

## 2021-02-01 NOTE — PROGRESS NOTES
Patient request for surgery signed by patient and witnessed and signed by RN. Prescription for Norco and narcotic prescription electronically sent to pharmacy per Dr. Ed Villarreal order and patient instructed to  prescription before surgery.    Pre-

## 2021-02-01 NOTE — H&P
Madeleine Weiss is a 69year old female that presents with Patient presents with:  Mass: R hypothenar  .     REFERRED BY:  Mekhi Griffin    Pacemaker: No  Latex Allergy: no  Coumadin: No  Plavix: No  Other anticoagulants: No  Cardiac stents: No    HAND D mouth daily. • Metoprolol Succinate  MG Oral Tablet 24 Hr Take 1 tablet (100 mg total) by mouth daily. 90 tablet 4   • amLODIPine Besylate 5 MG Oral Tab Take 1 tablet (5 mg total) by mouth daily.  90 tablet 4   • losartan Potassium 50 MG Oral Tab above heart level is critical.  Therapy will be necessary post-operatively.   Failure to comply with post-operative instructions, including therapy, will have significant impact on the final result, and could lead to permanent pain, stiffness, weakness, and

## 2021-02-01 NOTE — H&P (VIEW-ONLY)
Arzella Saint is a 69year old female that presents with Patient presents with:  Mass: R hypothenar  .     REFERRED BY:  Yani Rios    Pacemaker: No  Latex Allergy: no  Coumadin: No  Plavix: No  Other anticoagulants: No  Cardiac stents: No    HAND D mouth daily. • Metoprolol Succinate  MG Oral Tablet 24 Hr Take 1 tablet (100 mg total) by mouth daily. 90 tablet 4   • amLODIPine Besylate 5 MG Oral Tab Take 1 tablet (5 mg total) by mouth daily.  90 tablet 4   • losartan Potassium 50 MG Oral Tab above heart level is critical.  Therapy will be necessary post-operatively.   Failure to comply with post-operative instructions, including therapy, will have significant impact on the final result, and could lead to permanent pain, stiffness, weakness, and

## 2021-02-02 DIAGNOSIS — D21.11 BENIGN CONNECTIVE TISSUE NEOPLASM OF FINGER, RIGHT: Primary | ICD-10-CM

## 2021-02-17 ENCOUNTER — LAB ENCOUNTER (OUTPATIENT)
Dept: LAB | Age: 71
End: 2021-02-17
Attending: PLASTIC SURGERY
Payer: MEDICARE

## 2021-02-17 DIAGNOSIS — Z01.818 PREOP TESTING: ICD-10-CM

## 2021-02-17 LAB — SARS-COV-2 RNA RESP QL NAA+PROBE: NOT DETECTED

## 2021-02-19 ENCOUNTER — ANESTHESIA EVENT (OUTPATIENT)
Dept: SURGERY | Facility: HOSPITAL | Age: 71
End: 2021-02-19
Payer: MEDICARE

## 2021-02-19 ENCOUNTER — HOSPITAL ENCOUNTER (OUTPATIENT)
Facility: HOSPITAL | Age: 71
Setting detail: HOSPITAL OUTPATIENT SURGERY
Discharge: HOME | End: 2021-02-19
Attending: PLASTIC SURGERY | Admitting: PLASTIC SURGERY
Payer: MEDICARE

## 2021-02-19 ENCOUNTER — ANESTHESIA (OUTPATIENT)
Dept: SURGERY | Facility: HOSPITAL | Age: 71
End: 2021-02-19
Payer: MEDICARE

## 2021-02-19 ENCOUNTER — HOSPITAL DOCUMENTATION (OUTPATIENT)
Dept: SURGERY | Facility: CLINIC | Age: 71
End: 2021-02-19

## 2021-02-19 VITALS
BODY MASS INDEX: 26.22 KG/M2 | SYSTOLIC BLOOD PRESSURE: 131 MMHG | TEMPERATURE: 97 F | HEART RATE: 82 BPM | OXYGEN SATURATION: 100 % | DIASTOLIC BLOOD PRESSURE: 71 MMHG | WEIGHT: 148 LBS | RESPIRATION RATE: 17 BRPM | HEIGHT: 63 IN

## 2021-02-19 DIAGNOSIS — Z01.818 PREOP TESTING: Primary | ICD-10-CM

## 2021-02-19 DIAGNOSIS — D21.11 BENIGN CONNECTIVE TISSUE NEOPLASM OF FINGER, RIGHT: ICD-10-CM

## 2021-02-19 DIAGNOSIS — D21.11 BENIGN NEOPLASM OF CONNECTIVE AND OTHER SOFT TISSUE OF RIGHT UPPER LIMB, INCLUDING SHOULDER: Primary | ICD-10-CM

## 2021-02-19 PROCEDURE — 0LB70ZZ EXCISION OF RIGHT HAND TENDON, OPEN APPROACH: ICD-10-PCS | Performed by: PLASTIC SURGERY

## 2021-02-19 PROCEDURE — 26116 EXC HAND TUM DEEP < 1.5 CM: CPT | Performed by: PLASTIC SURGERY

## 2021-02-19 RX ORDER — MORPHINE SULFATE 4 MG/ML
4 INJECTION, SOLUTION INTRAMUSCULAR; INTRAVENOUS EVERY 10 MIN PRN
Status: DISCONTINUED | OUTPATIENT
Start: 2021-02-19 | End: 2021-02-19

## 2021-02-19 RX ORDER — DEXAMETHASONE SODIUM PHOSPHATE 4 MG/ML
VIAL (ML) INJECTION AS NEEDED
Status: DISCONTINUED | OUTPATIENT
Start: 2021-02-19 | End: 2021-02-19 | Stop reason: SURG

## 2021-02-19 RX ORDER — MORPHINE SULFATE 10 MG/ML
6 INJECTION, SOLUTION INTRAMUSCULAR; INTRAVENOUS EVERY 10 MIN PRN
Status: DISCONTINUED | OUTPATIENT
Start: 2021-02-19 | End: 2021-02-19

## 2021-02-19 RX ORDER — NALOXONE HYDROCHLORIDE 0.4 MG/ML
80 INJECTION, SOLUTION INTRAMUSCULAR; INTRAVENOUS; SUBCUTANEOUS AS NEEDED
Status: DISCONTINUED | OUTPATIENT
Start: 2021-02-19 | End: 2021-02-19

## 2021-02-19 RX ORDER — HYDROMORPHONE HYDROCHLORIDE 1 MG/ML
0.4 INJECTION, SOLUTION INTRAMUSCULAR; INTRAVENOUS; SUBCUTANEOUS EVERY 5 MIN PRN
Status: DISCONTINUED | OUTPATIENT
Start: 2021-02-19 | End: 2021-02-19

## 2021-02-19 RX ORDER — HYDROMORPHONE HYDROCHLORIDE 1 MG/ML
0.2 INJECTION, SOLUTION INTRAMUSCULAR; INTRAVENOUS; SUBCUTANEOUS EVERY 5 MIN PRN
Status: DISCONTINUED | OUTPATIENT
Start: 2021-02-19 | End: 2021-02-19

## 2021-02-19 RX ORDER — ONDANSETRON 2 MG/ML
INJECTION INTRAMUSCULAR; INTRAVENOUS AS NEEDED
Status: DISCONTINUED | OUTPATIENT
Start: 2021-02-19 | End: 2021-02-19 | Stop reason: SURG

## 2021-02-19 RX ORDER — ACETAMINOPHEN 500 MG
1000 TABLET ORAL ONCE
Status: COMPLETED | OUTPATIENT
Start: 2021-02-19 | End: 2021-02-19

## 2021-02-19 RX ORDER — METOPROLOL TARTRATE 5 MG/5ML
2.5 INJECTION INTRAVENOUS ONCE
Status: DISCONTINUED | OUTPATIENT
Start: 2021-02-19 | End: 2021-02-19

## 2021-02-19 RX ORDER — GLYCOPYRROLATE 0.2 MG/ML
INJECTION, SOLUTION INTRAMUSCULAR; INTRAVENOUS AS NEEDED
Status: DISCONTINUED | OUTPATIENT
Start: 2021-02-19 | End: 2021-02-19 | Stop reason: SURG

## 2021-02-19 RX ORDER — LIDOCAINE HYDROCHLORIDE 10 MG/ML
INJECTION, SOLUTION EPIDURAL; INFILTRATION; INTRACAUDAL; PERINEURAL AS NEEDED
Status: DISCONTINUED | OUTPATIENT
Start: 2021-02-19 | End: 2021-02-19 | Stop reason: SURG

## 2021-02-19 RX ORDER — PROCHLORPERAZINE EDISYLATE 5 MG/ML
5 INJECTION INTRAMUSCULAR; INTRAVENOUS ONCE AS NEEDED
Status: DISCONTINUED | OUTPATIENT
Start: 2021-02-19 | End: 2021-02-19

## 2021-02-19 RX ORDER — ONDANSETRON 2 MG/ML
4 INJECTION INTRAMUSCULAR; INTRAVENOUS ONCE AS NEEDED
Status: DISCONTINUED | OUTPATIENT
Start: 2021-02-19 | End: 2021-02-19

## 2021-02-19 RX ORDER — HYDROCODONE BITARTRATE AND ACETAMINOPHEN 7.5; 325 MG/1; MG/1
1 TABLET ORAL EVERY 4 HOURS PRN
Status: DISCONTINUED | OUTPATIENT
Start: 2021-02-19 | End: 2021-02-19

## 2021-02-19 RX ORDER — HYDROCODONE BITARTRATE AND ACETAMINOPHEN 5; 325 MG/1; MG/1
1 TABLET ORAL AS NEEDED
Status: DISCONTINUED | OUTPATIENT
Start: 2021-02-19 | End: 2021-02-19

## 2021-02-19 RX ORDER — HYDROMORPHONE HYDROCHLORIDE 1 MG/ML
0.6 INJECTION, SOLUTION INTRAMUSCULAR; INTRAVENOUS; SUBCUTANEOUS EVERY 5 MIN PRN
Status: DISCONTINUED | OUTPATIENT
Start: 2021-02-19 | End: 2021-02-19

## 2021-02-19 RX ORDER — HALOPERIDOL 5 MG/ML
0.25 INJECTION INTRAMUSCULAR ONCE AS NEEDED
Status: DISCONTINUED | OUTPATIENT
Start: 2021-02-19 | End: 2021-02-19

## 2021-02-19 RX ORDER — HYDROCODONE BITARTRATE AND ACETAMINOPHEN 5; 325 MG/1; MG/1
2 TABLET ORAL AS NEEDED
Status: DISCONTINUED | OUTPATIENT
Start: 2021-02-19 | End: 2021-02-19

## 2021-02-19 RX ORDER — SODIUM CHLORIDE, SODIUM LACTATE, POTASSIUM CHLORIDE, CALCIUM CHLORIDE 600; 310; 30; 20 MG/100ML; MG/100ML; MG/100ML; MG/100ML
INJECTION, SOLUTION INTRAVENOUS CONTINUOUS
Status: DISCONTINUED | OUTPATIENT
Start: 2021-02-19 | End: 2021-02-19

## 2021-02-19 RX ORDER — MORPHINE SULFATE 4 MG/ML
2 INJECTION, SOLUTION INTRAMUSCULAR; INTRAVENOUS EVERY 10 MIN PRN
Status: DISCONTINUED | OUTPATIENT
Start: 2021-02-19 | End: 2021-02-19

## 2021-02-19 RX ORDER — KETOROLAC TROMETHAMINE 30 MG/ML
INJECTION, SOLUTION INTRAMUSCULAR; INTRAVENOUS AS NEEDED
Status: DISCONTINUED | OUTPATIENT
Start: 2021-02-19 | End: 2021-02-19 | Stop reason: SURG

## 2021-02-19 RX ORDER — EPHEDRINE SULFATE 50 MG/ML
INJECTION INTRAVENOUS AS NEEDED
Status: DISCONTINUED | OUTPATIENT
Start: 2021-02-19 | End: 2021-02-19 | Stop reason: SURG

## 2021-02-19 RX ADMIN — GLYCOPYRROLATE 0.2 MG: 0.2 INJECTION, SOLUTION INTRAMUSCULAR; INTRAVENOUS at 08:53:00

## 2021-02-19 RX ADMIN — DEXAMETHASONE SODIUM PHOSPHATE 4 MG: 4 MG/ML VIAL (ML) INJECTION at 08:36:00

## 2021-02-19 RX ADMIN — ONDANSETRON 4 MG: 2 INJECTION INTRAMUSCULAR; INTRAVENOUS at 08:36:00

## 2021-02-19 RX ADMIN — KETOROLAC TROMETHAMINE 15 MG: 30 INJECTION, SOLUTION INTRAMUSCULAR; INTRAVENOUS at 09:10:00

## 2021-02-19 RX ADMIN — LIDOCAINE HYDROCHLORIDE 50 MG: 10 INJECTION, SOLUTION EPIDURAL; INFILTRATION; INTRACAUDAL; PERINEURAL at 08:26:00

## 2021-02-19 RX ADMIN — EPHEDRINE SULFATE 10 MG: 50 INJECTION INTRAVENOUS at 08:32:00

## 2021-02-19 RX ADMIN — EPHEDRINE SULFATE 10 MG: 50 INJECTION INTRAVENOUS at 08:40:00

## 2021-02-19 RX ADMIN — EPHEDRINE SULFATE 10 MG: 50 INJECTION INTRAVENOUS at 08:53:00

## 2021-02-19 RX ADMIN — EPHEDRINE SULFATE 10 MG: 50 INJECTION INTRAVENOUS at 08:37:00

## 2021-02-19 NOTE — ANESTHESIA PREPROCEDURE EVALUATION
Anesthesia PreOp Note    HPI:     Armando Newell is a 69year old female who presents for preoperative consultation requested by: Nam Shaw MD    Date of Surgery: 2/19/2021    Procedure(s):  HAND MASS EXCISION  Indication: Benign connective Tab, Take 1 tablet by mouth every 4 to 6 hours as needed for Pain., Disp: 15 tablet, Rfl: 0        •  lactated ringers infusion, , Intravenous, Continuous, Nam Shaw MD, Last Rate: 20 mL/hr at 02/19/21 0742    •  metoprolol Tartrate (LOPRESSOR Vital Signs: Body mass index is 26.22 kg/m². height is 1.6 m (5' 3\") and weight is 67.1 kg (148 lb). Her oral temperature is 98.1 °F (36.7 °C). Her blood pressure is 144/80 and her pulse is 68. Her respiration is 20 and oxygen saturation is 98%.

## 2021-02-19 NOTE — ANESTHESIA PROCEDURE NOTES
Airway  Urgency: Elective      General Information and Staff    Patient location during procedure: OR  Resident/CRNA: Raymond Costello CRNA  Performed: CRNA     Indications and Patient Condition  Indications for airway management: anesthesia  Sedation level

## 2021-02-19 NOTE — INTERVAL H&P NOTE
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

## 2021-02-19 NOTE — ANESTHESIA POSTPROCEDURE EVALUATION
Patient: Andrea Santos    Procedure Summary     Date: 02/19/21 Room / Location: [facility] OR 01 / [facility] OR    Anesthesia Start: 0001 Anesthesia Stop: 7904    Procedure: HAND MASS EXCISION (Right ) Diagnosis:       Benign connective tissue neoplasm of

## 2021-02-19 NOTE — BRIEF OP NOTE
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

## 2021-02-20 ENCOUNTER — TELEPHONE (OUTPATIENT)
Dept: SURGERY | Facility: CLINIC | Age: 71
End: 2021-02-20

## 2021-02-20 NOTE — TELEPHONE ENCOUNTER
Used language line to interpret for Gren[place] speaking pt, Irish Elroy, [facility]. \"Doing well\"  Denies pain,numbness and tingling. Taking Norco prn,helpful. Dressing CDI  Wearing sling at all times. Denies edema.   Pt made aware Dr Varinder Tony would like her

## 2021-02-20 NOTE — OPERATIVE REPORT
[facility]    PATIENT'S NAME: Nelda De Anda   ATTENDING PHYSICIAN: Jey Pittman MD   OPERATING PHYSICIAN: Jey Pittman MD   PATIENT ACCOUNT#:   [id number]    LOCATION:  [address]  MEDICAL RECORD #:   [id number] condition. Dictated By Angel Martin MD  d: 02/19/2021 09:18:11  t: 02/19/2021 18:09:47  [facility] [id number]  [facility]/    cc: MD Adia Perry.  Kole Sam MD

## 2021-02-22 ENCOUNTER — TELEPHONE (OUTPATIENT)
Dept: SURGERY | Facility: CLINIC | Age: 71
End: 2021-02-22

## 2021-02-22 NOTE — TELEPHONE ENCOUNTER
Spoke w/ patient using language line,  Walker Booth ID # [id number]. Patient told per Dr. Sintia Abel pathology results from surgery,  A benign ganglion cyst   Patient Verbalized understanding.   Patient Instructed to call the office with any questions an

## 2021-02-23 ENCOUNTER — APPOINTMENT (OUTPATIENT)
Dept: SURGERY | Facility: CLINIC | Age: 71
End: 2021-02-23
Payer: MEDICARE

## 2021-02-23 DIAGNOSIS — M25.641 JOINT STIFFNESS OF HAND, RIGHT: Primary | ICD-10-CM

## 2021-02-23 DIAGNOSIS — M62.81 DISTAL MUSCLE WEAKNESS: ICD-10-CM

## 2021-02-23 PROCEDURE — 97110 THERAPEUTIC EXERCISES: CPT | Performed by: OCCUPATIONAL THERAPIST

## 2021-02-23 PROCEDURE — 97166 OT EVAL MOD COMPLEX 45 MIN: CPT | Performed by: OCCUPATIONAL THERAPIST

## 2021-02-24 NOTE — PROGRESS NOTES
OCCUPATIONAL THERAPY EVALUATION:   Mel Glynn   [id number]       SUBJECTIVE:    HX of Injury: Right hand Thenar Mass. Chief Complaint:   No complaints.     Precautions: None  Premorbid Functional Status: Independent w/ ADL's  Current Level of Functio the treatment plan and concur.    Vanessa Rodriguez MD

## 2021-03-02 ENCOUNTER — OFFICE VISIT (OUTPATIENT)
Dept: SURGERY | Facility: CLINIC | Age: 71
End: 2021-03-02
Payer: MEDICARE

## 2021-03-02 DIAGNOSIS — M25.641 JOINT STIFFNESS OF HAND, RIGHT: Primary | ICD-10-CM

## 2021-03-02 DIAGNOSIS — M62.81 DISTAL MUSCLE WEAKNESS: ICD-10-CM

## 2021-03-02 PROCEDURE — 97110 THERAPEUTIC EXERCISES: CPT | Performed by: OCCUPATIONAL THERAPIST

## 2021-03-02 NOTE — PROGRESS NOTES
Subjective: I have a little discomfort in my right small finger, however it does not wake me up at nite.       Objective:     Current level of performance:  ADL: Independent  Work: N/A  Leisure: Family    Measurements/Tests:  ROM:  Testing By: Felipe Gama

## 2021-03-10 ENCOUNTER — LAB ENCOUNTER (OUTPATIENT)
Dept: LAB | Age: 71
End: 2021-03-10
Attending: INTERNAL MEDICINE
Payer: MEDICARE

## 2021-03-10 DIAGNOSIS — I25.10 CORONARY ATHEROSCLEROSIS OF NATIVE CORONARY ARTERY: Primary | ICD-10-CM

## 2021-03-10 LAB
CHOLEST SMN-MCNC: 111 MG/DL (ref ?–200)
HDLC SERPL-MCNC: 45 MG/DL (ref 40–59)
LDLC SERPL CALC-MCNC: 52 MG/DL (ref ?–100)
NONHDLC SERPL-MCNC: 66 MG/DL (ref ?–130)
PATIENT FASTING Y/N/NP: YES
TRIGL SERPL-MCNC: 69 MG/DL (ref 30–149)
VLDLC SERPL CALC-MCNC: 14 MG/DL (ref 0–30)

## 2021-03-10 PROCEDURE — 80061 LIPID PANEL: CPT

## 2021-03-10 PROCEDURE — 36415 COLL VENOUS BLD VENIPUNCTURE: CPT

## 2021-03-12 DIAGNOSIS — Z23 NEED FOR VACCINATION: ICD-10-CM

## 2021-03-22 ENCOUNTER — LAB REQUISITION (OUTPATIENT)
Dept: LAB | Facility: HOSPITAL | Age: 71
End: 2021-03-22
Payer: MEDICARE

## 2021-03-22 DIAGNOSIS — K13.0 DISEASES OF LIPS: ICD-10-CM

## 2021-03-23 PROCEDURE — 88305 TISSUE EXAM BY PATHOLOGIST: CPT | Performed by: DERMATOLOGY

## 2021-03-25 ENCOUNTER — MED REC SCAN ONLY (OUTPATIENT)
Dept: FAMILY MEDICINE CLINIC | Facility: CLINIC | Age: 71
End: 2021-03-25

## 2021-06-01 ENCOUNTER — LAB ENCOUNTER (OUTPATIENT)
Dept: LAB | Age: 71
End: 2021-06-01
Attending: FAMILY MEDICINE
Payer: MEDICARE

## 2021-06-01 ENCOUNTER — OFFICE VISIT (OUTPATIENT)
Dept: FAMILY MEDICINE CLINIC | Facility: CLINIC | Age: 71
End: 2021-06-01
Payer: MEDICARE

## 2021-06-01 VITALS
BODY MASS INDEX: 26.79 KG/M2 | DIASTOLIC BLOOD PRESSURE: 72 MMHG | SYSTOLIC BLOOD PRESSURE: 128 MMHG | HEART RATE: 67 BPM | HEIGHT: 63 IN | WEIGHT: 151.19 LBS | TEMPERATURE: 97 F

## 2021-06-01 DIAGNOSIS — Z12.11 SCREEN FOR COLON CANCER: ICD-10-CM

## 2021-06-01 DIAGNOSIS — I10 ESSENTIAL HYPERTENSION: Primary | ICD-10-CM

## 2021-06-01 DIAGNOSIS — I10 ESSENTIAL HYPERTENSION: ICD-10-CM

## 2021-06-01 DIAGNOSIS — L29.9 ITCHING: ICD-10-CM

## 2021-06-01 DIAGNOSIS — Z13.5 SCREENING FOR GLAUCOMA: ICD-10-CM

## 2021-06-01 PROCEDURE — 80053 COMPREHEN METABOLIC PANEL: CPT

## 2021-06-01 PROCEDURE — 3078F DIAST BP <80 MM HG: CPT | Performed by: FAMILY MEDICINE

## 2021-06-01 PROCEDURE — 99214 OFFICE O/P EST MOD 30 MIN: CPT | Performed by: FAMILY MEDICINE

## 2021-06-01 PROCEDURE — 3074F SYST BP LT 130 MM HG: CPT | Performed by: FAMILY MEDICINE

## 2021-06-01 PROCEDURE — 85025 COMPLETE CBC W/AUTO DIFF WBC: CPT

## 2021-06-01 PROCEDURE — 36415 COLL VENOUS BLD VENIPUNCTURE: CPT

## 2021-06-01 PROCEDURE — 3008F BODY MASS INDEX DOCD: CPT | Performed by: FAMILY MEDICINE

## 2021-06-01 RX ORDER — LEVOCETIRIZINE DIHYDROCHLORIDE 5 MG/1
5 TABLET, FILM COATED ORAL EVERY EVENING
Qty: 30 TABLET | Refills: 1 | Status: SHIPPED | OUTPATIENT
Start: 2021-06-01 | End: 2021-07-26

## 2021-06-01 NOTE — PROGRESS NOTES
Armando Newell is a 69year old female. Patient presents with:  Hypertension: f/u    HPI:   Pt reports staying active walking. Taking her medications regularly. Had cholesterol test with her cardiologist - Dr. Roberta Bai and normal - stable on statin.  Rep joint pain, back pain    EXAM:   /72   Pulse 67   Temp 96.5 °F (35.8 °C) (Temporal)   Ht 5' 3\" (1.6 m)   Wt 151 lb 3.2 oz (68.6 kg)   BMI 26.78 kg/m²   GENERAL: well developed, well nourished,in no apparent distress  SKIN: no rashes,no suspicious le

## 2021-07-26 RX ORDER — LEVOCETIRIZINE DIHYDROCHLORIDE 5 MG/1
5 TABLET, FILM COATED ORAL EVERY EVENING
Qty: 30 TABLET | Refills: 11 | Status: SHIPPED | OUTPATIENT
Start: 2021-07-26 | End: 2022-06-22

## 2021-07-31 ENCOUNTER — TELEPHONE (OUTPATIENT)
Dept: GASTROENTEROLOGY | Facility: CLINIC | Age: 71
End: 2021-07-31

## 2021-07-31 NOTE — TELEPHONE ENCOUNTER
The patient's daughter contacted me as the on-call physician. Her mother has an appointment scheduled at [phone] on Monday with Dr. Aretha Hannah. She was under the impression that the appointment was for a colonoscopy, however, this is only an office visit.   She s

## 2021-08-02 ENCOUNTER — OFFICE VISIT (OUTPATIENT)
Dept: GASTROENTEROLOGY | Facility: CLINIC | Age: 71
End: 2021-08-02
Payer: MEDICARE

## 2021-08-02 VITALS
WEIGHT: 148.38 LBS | DIASTOLIC BLOOD PRESSURE: 72 MMHG | BODY MASS INDEX: 27.3 KG/M2 | SYSTOLIC BLOOD PRESSURE: 133 MMHG | HEIGHT: 62 IN | HEART RATE: 59 BPM

## 2021-08-02 DIAGNOSIS — Z12.11 ENCOUNTER FOR SCREENING COLONOSCOPY: Primary | ICD-10-CM

## 2021-08-02 PROCEDURE — 3078F DIAST BP <80 MM HG: CPT | Performed by: INTERNAL MEDICINE

## 2021-08-02 PROCEDURE — 3075F SYST BP GE 130 - 139MM HG: CPT | Performed by: INTERNAL MEDICINE

## 2021-08-02 PROCEDURE — 3008F BODY MASS INDEX DOCD: CPT | Performed by: INTERNAL MEDICINE

## 2021-08-02 RX ORDER — POLYETHYLENE GLYCOL 3350, SODIUM CHLORIDE, SODIUM BICARBONATE, POTASSIUM CHLORIDE 420; 11.2; 5.72; 1.48 G/4L; G/4L; G/4L; G/4L
POWDER, FOR SOLUTION ORAL
Qty: 1 EACH | Refills: 0 | Status: SHIPPED | OUTPATIENT
Start: 2021-08-02 | End: 2021-09-23

## 2021-08-02 RX ORDER — ALENDRONATE SODIUM 70 MG/1
70 TABLET ORAL WEEKLY
COMMUNITY
Start: 2021-05-30 | End: 2021-09-23

## 2021-08-02 NOTE — PATIENT INSTRUCTIONS
1. Schedule colonoscopy with MAC [Diagnosis: screening]    2.  bowel prep from pharmacy (split suprep or trilyte)    3.  Continue all medications for procedure except    ** If MAC @ [facility]:    - HOLD ACE/ARBs the night before and the day of the proce

## 2021-08-02 NOTE — H&P
[address] [facility] [address] Gastroenterology                                                                                                  Clinic History and Physical     Pa Refill   • Levocetirizine Dihydrochloride 5 MG Oral Tab Take 1 tablet (5 mg total) by mouth every evening. 30 tablet 11   • HYDROcodone-acetaminophen 7.5-325 MG Oral Tab Take 1 tablet by mouth every 4 to 6 hours as needed for Pain.  15 tablet 0   • atorvast cyanosis, clubbing or edema is evident. Neuro- Alert and interactive, and gross movements of extremities normal    Labs/Imaging:     Patient's labs and imaging were reviewed and discussed with patient today.     Recent Labs     03/04/19  0424 03/04/19  04 recreational drugs nor erectile dysfunction mediations 24 hours before procedure(s)   - NO herbal supplements or weight loss medications x 7 days prior to the procedure(s)    ** If MAC @ [facility] or IV twilight - continue all medications as prescribed

## 2021-08-03 ENCOUNTER — TELEPHONE (OUTPATIENT)
Dept: GASTROENTEROLOGY | Facility: CLINIC | Age: 71
End: 2021-08-03

## 2021-08-03 DIAGNOSIS — Z12.11 COLON CANCER SCREENING: Primary | ICD-10-CM

## 2021-08-03 NOTE — TELEPHONE ENCOUNTER
Scheduled for:  Colonoscopy 10056   Provider Name:  Brigette Jauregui  Date:  8/17/21  Location:   [facility]   Sedation:  Mac   Time: 2:30 Pm (pt is aware that [facility] will call the day before to confirm arrival time)     Prep:  Trilyte SpanishPrep instructions were given t

## 2021-08-09 ENCOUNTER — TELEPHONE (OUTPATIENT)
Dept: FAMILY MEDICINE CLINIC | Facility: CLINIC | Age: 71
End: 2021-08-09

## 2021-08-09 DIAGNOSIS — I25.10 CORONARY ARTERY DISEASE INVOLVING NATIVE HEART WITHOUT ANGINA PECTORIS, UNSPECIFIED VESSEL OR LESION TYPE: Primary | ICD-10-CM

## 2021-08-09 DIAGNOSIS — I25.2 HISTORY OF MI (MYOCARDIAL INFARCTION): ICD-10-CM

## 2021-08-09 NOTE — TELEPHONE ENCOUNTER
Son requesting referral to see cardiology. Patient had a heart attack last week Wednesday and cardiologist wants to see urgently today but needs a referral before seeing cardiology. They would like to know if referral can be sent today as soon as possible.

## 2021-08-14 ENCOUNTER — LAB REQUISITION (OUTPATIENT)
Dept: SURGERY | Age: 71
End: 2021-08-14
Payer: MEDICARE

## 2021-08-14 DIAGNOSIS — Z01.818 PREOP EXAMINATION: ICD-10-CM

## 2021-08-16 LAB — SARS-COV-2 RNA RESP QL NAA+PROBE: NOT DETECTED

## 2021-08-17 ENCOUNTER — TELEPHONE (OUTPATIENT)
Dept: GASTROENTEROLOGY | Facility: CLINIC | Age: 71
End: 2021-08-17

## 2021-08-17 ENCOUNTER — SURGERY CENTER DOCUMENTATION (OUTPATIENT)
Dept: SURGERY | Age: 71
End: 2021-08-17

## 2021-08-17 PROCEDURE — G0121 COLON CA SCRN NOT HI RSK IND: HCPCS | Performed by: INTERNAL MEDICINE

## 2021-08-17 NOTE — PROCEDURES
COLONOSCOPY REPORT    Nusrat Hunter     1950 Age 69year old   PCP Enedelia Crawford MD Endoscopist Rachel Lopez MD     Date of procedure: 21    Procedure: Colonoscopy     Pre-operative diagnosis: screening    Post-operative diagnosis: div rectal tone, no masses palpated. Recommend:  · Repeat in 10 years. If new signs or symptoms develop, colonoscopy may need to be repeated sooner. · High fiber diet. · Monitor for blood in the stool.  If having more than just tinge of blood, call off

## 2021-08-17 NOTE — TELEPHONE ENCOUNTER
Entered into Epic. Recall CLN in 10 years per Dr. Kristyn Wang. Last CLN done 08/17/2021. Recall entered into Patient Outreach for 08/17/2031. HM updated.

## 2021-09-23 ENCOUNTER — OFFICE VISIT (OUTPATIENT)
Dept: FAMILY MEDICINE CLINIC | Facility: CLINIC | Age: 71
End: 2021-09-23
Payer: MEDICARE

## 2021-09-23 VITALS
WEIGHT: 148.19 LBS | HEART RATE: 63 BPM | HEIGHT: 62 IN | BODY MASS INDEX: 27.27 KG/M2 | TEMPERATURE: 97 F | DIASTOLIC BLOOD PRESSURE: 77 MMHG | SYSTOLIC BLOOD PRESSURE: 133 MMHG

## 2021-09-23 DIAGNOSIS — Z00.00 ENCOUNTER FOR ANNUAL HEALTH EXAMINATION: ICD-10-CM

## 2021-09-23 DIAGNOSIS — I05.0 MITRAL VALVE STENOSIS, UNSPECIFIED ETIOLOGY: ICD-10-CM

## 2021-09-23 DIAGNOSIS — E55.9 VITAMIN D DEFICIENCY: ICD-10-CM

## 2021-09-23 DIAGNOSIS — D69.6 THROMBOCYTOPENIA (HCC): ICD-10-CM

## 2021-09-23 DIAGNOSIS — I10 ESSENTIAL HYPERTENSION: Primary | Chronic | ICD-10-CM

## 2021-09-23 DIAGNOSIS — Z12.31 VISIT FOR SCREENING MAMMOGRAM: ICD-10-CM

## 2021-09-23 DIAGNOSIS — M17.11 PRIMARY OSTEOARTHRITIS OF RIGHT KNEE: ICD-10-CM

## 2021-09-23 PROCEDURE — 3078F DIAST BP <80 MM HG: CPT | Performed by: FAMILY MEDICINE

## 2021-09-23 PROCEDURE — G0008 ADMIN INFLUENZA VIRUS VAC: HCPCS | Performed by: FAMILY MEDICINE

## 2021-09-23 PROCEDURE — 96160 PT-FOCUSED HLTH RISK ASSMT: CPT | Performed by: FAMILY MEDICINE

## 2021-09-23 PROCEDURE — G0439 PPPS, SUBSEQ VISIT: HCPCS | Performed by: FAMILY MEDICINE

## 2021-09-23 PROCEDURE — G0009 ADMIN PNEUMOCOCCAL VACCINE: HCPCS | Performed by: FAMILY MEDICINE

## 2021-09-23 PROCEDURE — 99397 PER PM REEVAL EST PAT 65+ YR: CPT | Performed by: FAMILY MEDICINE

## 2021-09-23 PROCEDURE — 90662 IIV NO PRSV INCREASED AG IM: CPT | Performed by: FAMILY MEDICINE

## 2021-09-23 PROCEDURE — 3075F SYST BP GE 130 - 139MM HG: CPT | Performed by: FAMILY MEDICINE

## 2021-09-23 PROCEDURE — 90732 PPSV23 VACC 2 YRS+ SUBQ/IM: CPT | Performed by: FAMILY MEDICINE

## 2021-09-23 PROCEDURE — 3008F BODY MASS INDEX DOCD: CPT | Performed by: FAMILY MEDICINE

## 2021-09-23 RX ORDER — ERGOCALCIFEROL (VITAMIN D2) 1250 MCG
50000 CAPSULE ORAL WEEKLY
Qty: 12 CAPSULE | Refills: 4 | Status: SHIPPED | OUTPATIENT
Start: 2021-09-23

## 2021-09-23 RX ORDER — AMLODIPINE BESYLATE 5 MG/1
5 TABLET ORAL DAILY
Qty: 90 TABLET | Refills: 4 | Status: SHIPPED | OUTPATIENT
Start: 2021-09-23

## 2021-09-23 RX ORDER — LOSARTAN POTASSIUM 50 MG/1
50 TABLET ORAL DAILY
Qty: 90 TABLET | Refills: 4 | Status: SHIPPED | OUTPATIENT
Start: 2021-09-23

## 2021-09-23 RX ORDER — ATORVASTATIN CALCIUM 40 MG/1
TABLET, FILM COATED ORAL
Qty: 90 TABLET | Refills: 4 | Status: SHIPPED | OUTPATIENT
Start: 2021-09-23

## 2021-09-23 RX ORDER — METOPROLOL SUCCINATE 100 MG/1
100 TABLET, EXTENDED RELEASE ORAL DAILY
Qty: 90 TABLET | Refills: 4 | Status: SHIPPED | OUTPATIENT
Start: 2021-09-23

## 2021-09-23 NOTE — PATIENT INSTRUCTIONS
Narcisa Teague's SCREENING SCHEDULE   Tests on this list are recommended by your physician but may not be covered, or covered at this frequency, by your insurer. Please check with your insurance carrier before scheduling to verify coverage.    PREVENT 01/06/2021      No recommendations at this time   Pap and Pelvic    Pap   Covered every 2 years for women at normal risk;  Annually if at high risk -  No recommendations at this time    Chlamydia Annually if high risk -  No recommendations at this time   Sc http://www. idph.state. [place]/public/books/advin.htm  A link to the [facility]. This site has a lot of good information including definitions of the different types of Advance Directives.  It also has the State forms available on it's webs

## 2021-09-23 NOTE — PROGRESS NOTES
HPI:   Presley Greene is a 69year old female who presents for a Medicare Subsequent Annual Wellness visit (Pt already had Initial Annual Wellness). Reports not taking the statin nor vitamin D because did not have them.    Saw Dr. Roland Skinner last month Encounters:  09/23/21 : 148 lb 3.2 oz (67.2 kg)  08/02/21 : 148 lb 6.4 oz (67.3 kg)  06/01/21 : 151 lb 3.2 oz (68.6 kg)     Last Cholesterol Labs:   Lab Results   Component Value Date    CHOLEST 111 03/10/2021    HDL 45 03/10/2021    LDL 52 03/10/2021    T with exertion  CARDIOVASCULAR: denies chest pain on exertion  GI: denies abdominal pain, denies heartburn  : denies dysuria, vaginal discharge or itching, no complaint of urinary incontinence   MUSCULOSKELETAL: denies back pain  NEURO: denies headaches Appearance:  Alert, cooperative, no distress, appears stated age   Head:  Normocephalic, without obvious abnormality, atraumatic   Eyes:  PERRL, conjunctiva/corneas clear, EOM's intact both eyes   Ears:  Normal TM's and external ear canals, both ears   Nos amlodipine but given her blood pressure today would continue the same at 5 mg.  - COMP METABOLIC PANEL (14); Future  - CBC WITH DIFFERENTIAL WITH PLATELET; Future  - LIPID PANEL;  Future  - TSH W REFLEX TO FREE T4; Future  - HEMOGLOBIN A1C; Future  - CARDIO Results   Component Value Date    GLU 81 06/01/2021        Cardiovascular Disease Screening    Lipid Panel  Cholesterol  Lipoprotein (HDL)  Triglycerides Covered every 5 years for all Medicare beneficiaries without apparent signs or symptoms of cardiovascu recommendations at this time    Pneumococcal Each vaccine ([id number] & [id number]) covered once after 65 Prevnar 13: 12/01/2020    [id number]: -     Pneumococcal Vaccination(1 of 2 - PPSV23) due on 01/26/2021    Hepatitis B One screening covered for john

## 2021-09-27 ENCOUNTER — LAB ENCOUNTER (OUTPATIENT)
Dept: LAB | Age: 71
End: 2021-09-27
Attending: FAMILY MEDICINE
Payer: MEDICARE

## 2021-09-27 DIAGNOSIS — I10 ESSENTIAL HYPERTENSION: ICD-10-CM

## 2021-09-27 DIAGNOSIS — E55.9 VITAMIN D DEFICIENCY: ICD-10-CM

## 2021-09-27 PROCEDURE — 83036 HEMOGLOBIN GLYCOSYLATED A1C: CPT

## 2021-09-27 PROCEDURE — 82607 VITAMIN B-12: CPT

## 2021-09-27 PROCEDURE — 80061 LIPID PANEL: CPT

## 2021-09-27 PROCEDURE — 84443 ASSAY THYROID STIM HORMONE: CPT

## 2021-09-27 PROCEDURE — 82306 VITAMIN D 25 HYDROXY: CPT

## 2021-09-27 PROCEDURE — 36415 COLL VENOUS BLD VENIPUNCTURE: CPT

## 2021-09-27 PROCEDURE — 80053 COMPREHEN METABOLIC PANEL: CPT

## 2021-09-27 PROCEDURE — 85025 COMPLETE CBC W/AUTO DIFF WBC: CPT

## 2021-10-04 NOTE — PROGRESS NOTES
Jose Alfredo Julioina - Labs are all stable. You are still in prediabetes range. Continue to work on healthy diet and regular exercise. Your vitamin B12 levels are a bit high. If you are taking extra B12, please cut back.   Rest of the labs are normal and continu

## 2021-11-10 RX ORDER — ALENDRONATE SODIUM 70 MG/1
70 TABLET ORAL WEEKLY
Qty: 12 TABLET | Refills: 1 | Status: SHIPPED | OUTPATIENT
Start: 2021-11-10

## 2021-11-10 NOTE — TELEPHONE ENCOUNTER
Rx listed as historical, pls advise, thanks in advance.              Refill passed per [facility] protocol   Requested Prescriptions   Pending Prescriptions Disp Refills    ALENDRONATE 70 MG Oral Tab [Pharmacy Med Name: Alendronate Sodium 70 Mg Tab Nor

## 2021-12-07 ENCOUNTER — HOSPITAL ENCOUNTER (OUTPATIENT)
Dept: MAMMOGRAPHY | Age: 71
Discharge: HOME | End: 2021-12-07
Attending: FAMILY MEDICINE
Payer: MEDICARE

## 2021-12-07 DIAGNOSIS — Z12.31 VISIT FOR SCREENING MAMMOGRAM: ICD-10-CM

## 2021-12-07 PROCEDURE — 77067 SCR MAMMO BI INCL CAD: CPT | Performed by: FAMILY MEDICINE

## 2021-12-07 PROCEDURE — 77063 BREAST TOMOSYNTHESIS BI: CPT | Performed by: FAMILY MEDICINE

## 2022-03-14 ENCOUNTER — TELEPHONE (OUTPATIENT)
Dept: FAMILY MEDICINE CLINIC | Facility: CLINIC | Age: 72
End: 2022-03-14

## 2022-03-24 ENCOUNTER — OFFICE VISIT (OUTPATIENT)
Dept: FAMILY MEDICINE CLINIC | Facility: CLINIC | Age: 72
End: 2022-03-24
Payer: MEDICARE

## 2022-03-24 ENCOUNTER — LAB ENCOUNTER (OUTPATIENT)
Dept: LAB | Age: 72
End: 2022-03-24
Attending: FAMILY MEDICINE
Payer: MEDICARE

## 2022-03-24 VITALS
WEIGHT: 144.38 LBS | BODY MASS INDEX: 26.57 KG/M2 | SYSTOLIC BLOOD PRESSURE: 132 MMHG | TEMPERATURE: 98 F | HEART RATE: 64 BPM | DIASTOLIC BLOOD PRESSURE: 72 MMHG | HEIGHT: 62 IN

## 2022-03-24 DIAGNOSIS — R73.03 PREDIABETES: ICD-10-CM

## 2022-03-24 DIAGNOSIS — I10 ESSENTIAL HYPERTENSION: ICD-10-CM

## 2022-03-24 DIAGNOSIS — I10 ESSENTIAL HYPERTENSION: Primary | ICD-10-CM

## 2022-03-24 LAB
ANION GAP SERPL CALC-SCNC: 4 MMOL/L (ref 0–18)
BUN BLD-MCNC: 12 MG/DL (ref 7–18)
BUN/CREAT SERPL: 16.7 (ref 10–20)
CALCIUM BLD-MCNC: 8.8 MG/DL (ref 8.5–10.1)
CHLORIDE SERPL-SCNC: 108 MMOL/L (ref 98–112)
CO2 SERPL-SCNC: 28 MMOL/L (ref 21–32)
CREAT BLD-MCNC: 0.72 MG/DL
EST. AVERAGE GLUCOSE BLD GHB EST-MCNC: 126 MG/DL (ref 68–126)
GLUCOSE BLD-MCNC: 98 MG/DL (ref 70–99)
HBA1C MFR BLD: 6 % (ref ?–5.7)
OSMOLALITY SERPL CALC.SUM OF ELEC: 290 MOSM/KG (ref 275–295)
POTASSIUM SERPL-SCNC: 4.2 MMOL/L (ref 3.5–5.1)
SODIUM SERPL-SCNC: 140 MMOL/L (ref 136–145)

## 2022-03-24 PROCEDURE — 3008F BODY MASS INDEX DOCD: CPT | Performed by: FAMILY MEDICINE

## 2022-03-24 PROCEDURE — 99213 OFFICE O/P EST LOW 20 MIN: CPT | Performed by: FAMILY MEDICINE

## 2022-03-24 PROCEDURE — 3075F SYST BP GE 130 - 139MM HG: CPT | Performed by: FAMILY MEDICINE

## 2022-03-24 PROCEDURE — 80048 BASIC METABOLIC PNL TOTAL CA: CPT

## 2022-03-24 PROCEDURE — 3078F DIAST BP <80 MM HG: CPT | Performed by: FAMILY MEDICINE

## 2022-03-24 PROCEDURE — 36415 COLL VENOUS BLD VENIPUNCTURE: CPT

## 2022-03-24 PROCEDURE — 83036 HEMOGLOBIN GLYCOSYLATED A1C: CPT

## 2022-03-29 NOTE — PROGRESS NOTES
Jose Alfredo Brewster - Labs are stable. Still in pre-diabetes range but controlled - no medications needed.  Kidney function is stable. - Dr. Cat Wen

## 2022-03-30 ENCOUNTER — TELEPHONE (OUTPATIENT)
Dept: CASE MANAGEMENT | Age: 72
End: 2022-03-30

## 2022-03-30 NOTE — TELEPHONE ENCOUNTER
Dr. Kimberley Ferreira,    Dr. Israel Campbell office is requesting a referral for an appointment 4/6/22 for follow up appointments. Pended referral please review diagnosis and sign off if you agree. Thank you.   Gabriele Ren

## 2022-04-27 RX ORDER — ALENDRONATE SODIUM 70 MG/1
70 TABLET ORAL WEEKLY
Qty: 12 TABLET | Refills: 1 | Status: SHIPPED | OUTPATIENT
Start: 2022-04-27 | End: 2023-03-27

## 2022-04-27 NOTE — TELEPHONE ENCOUNTER
Refill passed per [facility] [place] protocol. Requested Prescriptions   Pending Prescriptions Disp Refills    ALENDRONATE 70 MG Oral Tab [Pharmacy Med Name: Alendronate Sodium 70 Mg Tab Nort] 12 tablet 0     Sig: Take 1 tablet (70 mg total) by mouth once a week.         Osteoporosis Medication Protocol Passed - 4/27/2022  1:30 AM        Passed - Appointment within past 12 or next 3 months             Recent Outpatient Visits              1 month ago Essential hypertension    [facility], [place], Harjinder Daniel MD    Office Visit    7 months ago Essential hypertension    [facility], Harjinder Baca MD    Office Visit    8 months ago Encounter for screening colonoscopy    [facility], Blayne Lopez MD    Office Visit    11 months ago Essential hypertension    Harjinder Guzman MD    Office Visit    1 year ago Joint stiffness of hand, right    [address] [facility], [address] [place] [address],[address] [facility]    Office Visit           Future Appointments         Provider Department Appt Notes    In 2 months Cari Downing MD [facility] [facility], [address]

## 2022-05-11 ENCOUNTER — APPOINTMENT (OUTPATIENT)
Dept: URBAN - METROPOLITAN AREA CLINIC 321 | Age: 72
Setting detail: DERMATOLOGY
End: 2022-05-11

## 2022-05-11 DIAGNOSIS — D485 NEOPLASM OF UNCERTAIN BEHAVIOR OF SKIN: ICD-10-CM

## 2022-05-11 DIAGNOSIS — D22 MELANOCYTIC NEVI: ICD-10-CM

## 2022-05-11 DIAGNOSIS — R20.2 PARESTHESIA OF SKIN: ICD-10-CM

## 2022-05-11 DIAGNOSIS — L81.4 OTHER MELANIN HYPERPIGMENTATION: ICD-10-CM

## 2022-05-11 PROBLEM — D22.39 MELANOCYTIC NEVI OF OTHER PARTS OF FACE: Status: ACTIVE | Noted: 2022-05-11

## 2022-05-11 PROBLEM — D48.5 NEOPLASM OF UNCERTAIN BEHAVIOR OF SKIN: Status: ACTIVE | Noted: 2022-05-11

## 2022-05-11 PROCEDURE — 99213 OFFICE O/P EST LOW 20 MIN: CPT

## 2022-05-11 PROCEDURE — OTHER ADDITIONAL NOTES: OTHER

## 2022-05-11 PROCEDURE — OTHER COUNSELING: OTHER

## 2022-05-11 ASSESSMENT — LOCATION ZONE DERM
LOCATION ZONE: FACE
LOCATION ZONE: LIP
LOCATION ZONE: HAND

## 2022-05-11 ASSESSMENT — LOCATION SIMPLE DESCRIPTION DERM
LOCATION SIMPLE: RIGHT HAND
LOCATION SIMPLE: LEFT HAND
LOCATION SIMPLE: LEFT LIP
LOCATION SIMPLE: LEFT CHEEK
LOCATION SIMPLE: RIGHT FOREHEAD

## 2022-05-11 ASSESSMENT — LOCATION DETAILED DESCRIPTION DERM
LOCATION DETAILED: RIGHT RADIAL DORSAL HAND
LOCATION DETAILED: 2ND WEB SPACE RIGHT HAND
LOCATION DETAILED: LEFT CENTRAL MALAR CHEEK
LOCATION DETAILED: LEFT ULNAR DORSAL HAND
LOCATION DETAILED: RIGHT INFERIOR MEDIAL FOREHEAD
LOCATION DETAILED: LEFT UPPER CUTANEOUS LIP

## 2022-05-17 ENCOUNTER — TELEPHONE (OUTPATIENT)
Dept: FAMILY MEDICINE CLINIC | Facility: CLINIC | Age: 72
End: 2022-05-17

## 2022-05-17 DIAGNOSIS — H61.23 BILATERAL IMPACTED CERUMEN: Primary | ICD-10-CM

## 2022-05-17 NOTE — TELEPHONE ENCOUNTER
Called patient (verified full name and ) Informed patient referral was placed.  Patient verbalized understanding

## 2022-05-31 ENCOUNTER — OFFICE VISIT (OUTPATIENT)
Dept: OTOLARYNGOLOGY | Facility: CLINIC | Age: 72
End: 2022-05-31
Payer: MEDICARE

## 2022-05-31 VITALS — BODY MASS INDEX: 24.99 KG/M2 | HEIGHT: 65 IN | WEIGHT: 150 LBS

## 2022-05-31 DIAGNOSIS — H61.23 CERUMEN DEBRIS ON TYMPANIC MEMBRANE OF BOTH EARS: Primary | ICD-10-CM

## 2022-05-31 PROCEDURE — 3008F BODY MASS INDEX DOCD: CPT | Performed by: SPECIALIST

## 2022-05-31 RX ORDER — ASPIRIN 81 MG/1
TABLET, CHEWABLE ORAL
COMMUNITY

## 2022-05-31 NOTE — PROGRESS NOTES
Ears = bilateral cerumen occlussions. Fully cleaned under microscope using instrumentation and suctioning. Normal tympanic membranes.   Follow-up in 6 months time, sooner if problems

## 2022-06-22 RX ORDER — LEVOCETIRIZINE DIHYDROCHLORIDE 5 MG/1
5 TABLET, FILM COATED ORAL EVERY EVENING
Qty: 30 TABLET | Refills: 0 | Status: SHIPPED | OUTPATIENT
Start: 2022-06-22 | End: 2022-09-22

## 2022-09-22 ENCOUNTER — OFFICE VISIT (OUTPATIENT)
Dept: FAMILY MEDICINE CLINIC | Facility: CLINIC | Age: 72
End: 2022-09-22

## 2022-09-22 ENCOUNTER — LAB ENCOUNTER (OUTPATIENT)
Dept: LAB | Age: 72
End: 2022-09-22
Attending: FAMILY MEDICINE

## 2022-09-22 VITALS
BODY MASS INDEX: 23.52 KG/M2 | DIASTOLIC BLOOD PRESSURE: 70 MMHG | HEIGHT: 65 IN | TEMPERATURE: 97 F | HEART RATE: 62 BPM | SYSTOLIC BLOOD PRESSURE: 130 MMHG | WEIGHT: 141.19 LBS

## 2022-09-22 DIAGNOSIS — I10 ESSENTIAL HYPERTENSION: Chronic | ICD-10-CM

## 2022-09-22 DIAGNOSIS — R35.0 FREQUENT URINATION: ICD-10-CM

## 2022-09-22 DIAGNOSIS — I25.10 CORONARY ARTERY DISEASE INVOLVING NATIVE HEART WITHOUT ANGINA PECTORIS, UNSPECIFIED VESSEL OR LESION TYPE: ICD-10-CM

## 2022-09-22 DIAGNOSIS — R73.03 PREDIABETES: ICD-10-CM

## 2022-09-22 DIAGNOSIS — M81.0 AGE-RELATED OSTEOPOROSIS WITHOUT CURRENT PATHOLOGICAL FRACTURE: ICD-10-CM

## 2022-09-22 DIAGNOSIS — I34.2 NONRHEUMATIC MITRAL VALVE STENOSIS: ICD-10-CM

## 2022-09-22 DIAGNOSIS — I35.0 AORTIC VALVE STENOSIS, ETIOLOGY OF CARDIAC VALVE DISEASE UNSPECIFIED: ICD-10-CM

## 2022-09-22 DIAGNOSIS — Z12.31 VISIT FOR SCREENING MAMMOGRAM: ICD-10-CM

## 2022-09-22 DIAGNOSIS — E55.9 VITAMIN D DEFICIENCY: Primary | ICD-10-CM

## 2022-09-22 DIAGNOSIS — E55.9 VITAMIN D DEFICIENCY: ICD-10-CM

## 2022-09-22 DIAGNOSIS — I10 ESSENTIAL HYPERTENSION: ICD-10-CM

## 2022-09-22 DIAGNOSIS — Z00.00 ENCOUNTER FOR ANNUAL HEALTH EXAMINATION: ICD-10-CM

## 2022-09-22 DIAGNOSIS — M17.11 PRIMARY OSTEOARTHRITIS OF RIGHT KNEE: ICD-10-CM

## 2022-09-22 PROBLEM — D69.6 THROMBOCYTOPENIA (HCC): Chronic | Status: RESOLVED | Noted: 2020-12-01 | Resolved: 2022-09-22

## 2022-09-22 PROBLEM — D69.6 THROMBOCYTOPENIA: Chronic | Status: RESOLVED | Noted: 2020-12-01 | Resolved: 2022-09-22

## 2022-09-22 LAB
ALBUMIN SERPL-MCNC: 3.4 G/DL (ref 3.4–5)
ALBUMIN/GLOB SERPL: 0.9 {RATIO} (ref 1–2)
ALP LIVER SERPL-CCNC: 82 U/L
ALT SERPL-CCNC: 45 U/L
ANION GAP SERPL CALC-SCNC: 5 MMOL/L (ref 0–18)
AST SERPL-CCNC: 27 U/L (ref 15–37)
BASOPHILS # BLD AUTO: 0.03 X10(3) UL (ref 0–0.2)
BASOPHILS NFR BLD AUTO: 0.4 %
BILIRUB SERPL-MCNC: 0.3 MG/DL (ref 0.1–2)
BILIRUB UR QL: NEGATIVE
BUN BLD-MCNC: 10 MG/DL (ref 7–18)
BUN/CREAT SERPL: 16.4 (ref 10–20)
CALCIUM BLD-MCNC: 9 MG/DL (ref 8.5–10.1)
CHLORIDE SERPL-SCNC: 109 MMOL/L (ref 98–112)
CHOLEST SERPL-MCNC: 117 MG/DL (ref ?–200)
CO2 SERPL-SCNC: 27 MMOL/L (ref 21–32)
COLOR UR: YELLOW
CREAT BLD-MCNC: 0.61 MG/DL
CREAT UR-SCNC: 91.7 MG/DL
DEPRECATED RDW RBC AUTO: 45.2 FL (ref 35.1–46.3)
EOSINOPHIL # BLD AUTO: 0.17 X10(3) UL (ref 0–0.7)
EOSINOPHIL NFR BLD AUTO: 2.4 %
ERYTHROCYTE [DISTWIDTH] IN BLOOD BY AUTOMATED COUNT: 15.3 % (ref 11–15)
EST. AVERAGE GLUCOSE BLD GHB EST-MCNC: 128 MG/DL (ref 68–126)
FASTING PATIENT LIPID ANSWER: YES
FASTING STATUS PATIENT QL REPORTED: YES
GFR SERPLBLD BASED ON 1.73 SQ M-ARVRAT: 95 ML/MIN/1.73M2 (ref 60–?)
GLOBULIN PLAS-MCNC: 3.8 G/DL (ref 2.8–4.4)
GLUCOSE BLD-MCNC: 98 MG/DL (ref 70–99)
GLUCOSE UR-MCNC: NEGATIVE MG/DL
HBA1C MFR BLD: 6.1 % (ref ?–5.7)
HCT VFR BLD AUTO: 35.6 %
HDLC SERPL-MCNC: 43 MG/DL (ref 40–59)
HGB BLD-MCNC: 10.7 G/DL
IMM GRANULOCYTES # BLD AUTO: 0.01 X10(3) UL (ref 0–1)
IMM GRANULOCYTES NFR BLD: 0.1 %
KETONES UR-MCNC: NEGATIVE MG/DL
LDLC SERPL CALC-MCNC: 57 MG/DL (ref ?–100)
LYMPHOCYTES # BLD AUTO: 1.71 X10(3) UL (ref 1–4)
LYMPHOCYTES NFR BLD AUTO: 24.6 %
MCH RBC QN AUTO: 24.3 PG (ref 26–34)
MCHC RBC AUTO-ENTMCNC: 30.1 G/DL (ref 31–37)
MCV RBC AUTO: 80.9 FL
MICROALBUMIN UR-MCNC: 2.94 MG/DL
MICROALBUMIN/CREAT 24H UR-RTO: 32.1 UG/MG (ref ?–30)
MONOCYTES # BLD AUTO: 0.64 X10(3) UL (ref 0.1–1)
MONOCYTES NFR BLD AUTO: 9.2 %
NEUTROPHILS # BLD AUTO: 4.39 X10 (3) UL (ref 1.5–7.7)
NEUTROPHILS # BLD AUTO: 4.39 X10(3) UL (ref 1.5–7.7)
NEUTROPHILS NFR BLD AUTO: 63.3 %
NITRITE UR QL STRIP.AUTO: POSITIVE
NONHDLC SERPL-MCNC: 74 MG/DL (ref ?–130)
OSMOLALITY SERPL CALC.SUM OF ELEC: 291 MOSM/KG (ref 275–295)
PH UR: 7 [PH] (ref 5–8)
PLATELET # BLD AUTO: 259 10(3)UL (ref 150–450)
POTASSIUM SERPL-SCNC: 4.2 MMOL/L (ref 3.5–5.1)
PROT SERPL-MCNC: 7.2 G/DL (ref 6.4–8.2)
PROT UR-MCNC: NEGATIVE MG/DL
RBC # BLD AUTO: 4.4 X10(6)UL
SODIUM SERPL-SCNC: 141 MMOL/L (ref 136–145)
SP GR UR STRIP: 1.02 (ref 1–1.03)
T3FREE SERPL-MCNC: 2.99 PG/ML (ref 2.4–4.2)
T4 FREE SERPL-MCNC: 1.2 NG/DL (ref 0.8–1.7)
TRIGL SERPL-MCNC: 88 MG/DL (ref 30–149)
TSI SER-ACNC: 0.31 MIU/ML (ref 0.36–3.74)
UROBILINOGEN UR STRIP-ACNC: 0.2
VIT D+METAB SERPL-MCNC: 102.3 NG/ML (ref 30–100)
VLDLC SERPL CALC-MCNC: 13 MG/DL (ref 0–30)
WBC # BLD AUTO: 7 X10(3) UL (ref 4–11)
WBC #/AREA URNS AUTO: >50 /HPF
WBC CLUMPS UR QL AUTO: PRESENT /HPF

## 2022-09-22 PROCEDURE — 87186 SC STD MICRODIL/AGAR DIL: CPT | Performed by: FAMILY MEDICINE

## 2022-09-22 PROCEDURE — 84443 ASSAY THYROID STIM HORMONE: CPT

## 2022-09-22 PROCEDURE — 87088 URINE BACTERIA CULTURE: CPT | Performed by: FAMILY MEDICINE

## 2022-09-22 PROCEDURE — 87086 URINE CULTURE/COLONY COUNT: CPT | Performed by: FAMILY MEDICINE

## 2022-09-22 PROCEDURE — 84439 ASSAY OF FREE THYROXINE: CPT

## 2022-09-22 PROCEDURE — 81001 URINALYSIS AUTO W/SCOPE: CPT | Performed by: FAMILY MEDICINE

## 2022-09-22 PROCEDURE — 80061 LIPID PANEL: CPT

## 2022-09-22 PROCEDURE — 82306 VITAMIN D 25 HYDROXY: CPT

## 2022-09-22 PROCEDURE — 85025 COMPLETE CBC W/AUTO DIFF WBC: CPT

## 2022-09-22 PROCEDURE — 36415 COLL VENOUS BLD VENIPUNCTURE: CPT

## 2022-09-22 PROCEDURE — 84481 FREE ASSAY (FT-3): CPT

## 2022-09-22 PROCEDURE — 81015 MICROSCOPIC EXAM OF URINE: CPT | Performed by: FAMILY MEDICINE

## 2022-09-22 PROCEDURE — 82043 UR ALBUMIN QUANTITATIVE: CPT

## 2022-09-22 PROCEDURE — 80053 COMPREHEN METABOLIC PANEL: CPT

## 2022-09-22 PROCEDURE — 83036 HEMOGLOBIN GLYCOSYLATED A1C: CPT

## 2022-09-22 PROCEDURE — 82570 ASSAY OF URINE CREATININE: CPT

## 2022-09-22 RX ORDER — ATORVASTATIN CALCIUM 40 MG/1
TABLET, FILM COATED ORAL
Qty: 90 TABLET | Refills: 4 | Status: SHIPPED | OUTPATIENT
Start: 2022-09-22

## 2022-09-22 RX ORDER — AMLODIPINE BESYLATE 5 MG/1
5 TABLET ORAL DAILY
Qty: 90 TABLET | Refills: 4 | Status: SHIPPED | OUTPATIENT
Start: 2022-09-22

## 2022-09-22 RX ORDER — ERGOCALCIFEROL (VITAMIN D2) 1250 MCG
50000 CAPSULE ORAL WEEKLY
Qty: 12 CAPSULE | Refills: 4 | Status: SHIPPED | OUTPATIENT
Start: 2022-09-22

## 2022-09-22 RX ORDER — LOSARTAN POTASSIUM 50 MG/1
50 TABLET ORAL DAILY
Qty: 90 TABLET | Refills: 4 | Status: SHIPPED | OUTPATIENT
Start: 2022-09-22

## 2022-09-22 RX ORDER — METOPROLOL SUCCINATE 100 MG/1
100 TABLET, EXTENDED RELEASE ORAL DAILY
Qty: 90 TABLET | Refills: 4 | Status: SHIPPED | OUTPATIENT
Start: 2022-09-22

## 2022-09-22 RX ORDER — MIRABEGRON 25 MG/1
25 TABLET, FILM COATED, EXTENDED RELEASE ORAL DAILY
Qty: 90 TABLET | Refills: 1 | Status: SHIPPED | OUTPATIENT
Start: 2022-09-22 | End: 2022-12-21

## 2022-09-24 RX ORDER — NITROFURANTOIN 25; 75 MG/1; MG/1
100 CAPSULE ORAL 2 TIMES DAILY
Qty: 14 CAPSULE | Refills: 0 | Status: SHIPPED | OUTPATIENT
Start: 2022-09-24

## 2022-09-24 NOTE — PROGRESS NOTES
Jose Alfredo Brewster - You do have a urinary tract infection so I sent antibiotics to the pharmacy.  Please start Macrobid twice a day for 7 days. - Dr. Cici Oro

## 2022-09-26 NOTE — PROGRESS NOTES
Jose Alfredo Brewster - Please stop the weekly high dose vitamin D - Your levels are too high. Stop for 2 months and restart just once daily vitamin D 2000 units. You are still in the prediabetes range without diabetes.  You still have mild anemia that is stable. - Dr. Ernesto Puentes

## 2022-10-07 ENCOUNTER — TELEPHONE (OUTPATIENT)
Dept: FAMILY MEDICINE CLINIC | Facility: CLINIC | Age: 72
End: 2022-10-07

## 2022-10-07 DIAGNOSIS — Z01.30 BLOOD PRESSURE CHECK: Primary | ICD-10-CM

## 2022-10-07 NOTE — TELEPHONE ENCOUNTER
Patient is requesting referral.     Name of specialist and specialty department : [facility], pt unable to provide specialist name  Reason for visit with the specialist: Blood pressure  Address of the specialist office: [address] [place] [address]  Appointment date: Pt had appointment on 10/6 and will like to have a referral for one year worth of visits. Fax: [phone]   Phone: [phone]         CSS informed patient the turnaround time for referral is 5-7 business days. Patient was informed to check their [employer] account for referral status.

## 2022-10-07 NOTE — TELEPHONE ENCOUNTER
Please sign off if you agree with plan of care.      Thank you,  [facility]  Referral specialist

## 2022-12-06 ENCOUNTER — OFFICE VISIT (OUTPATIENT)
Dept: OTOLARYNGOLOGY | Facility: CLINIC | Age: 72
End: 2022-12-06
Payer: MEDICARE

## 2022-12-06 VITALS — WEIGHT: 141.19 LBS | TEMPERATURE: 98 F | HEIGHT: 65 IN | BODY MASS INDEX: 23.52 KG/M2

## 2022-12-06 DIAGNOSIS — H61.23 CERUMEN DEBRIS ON TYMPANIC MEMBRANE OF BOTH EARS: Primary | ICD-10-CM

## 2022-12-06 PROCEDURE — 3008F BODY MASS INDEX DOCD: CPT | Performed by: SPECIALIST

## 2022-12-06 PROCEDURE — 69210 REMOVE IMPACTED EAR WAX UNI: CPT | Performed by: SPECIALIST

## 2022-12-09 ENCOUNTER — HOSPITAL ENCOUNTER (OUTPATIENT)
Dept: MAMMOGRAPHY | Age: 72
Discharge: HOME | End: 2022-12-09
Attending: FAMILY MEDICINE
Payer: MEDICARE

## 2022-12-09 DIAGNOSIS — Z12.31 VISIT FOR SCREENING MAMMOGRAM: ICD-10-CM

## 2022-12-09 PROCEDURE — 77067 SCR MAMMO BI INCL CAD: CPT | Performed by: FAMILY MEDICINE

## 2022-12-09 PROCEDURE — 77063 BREAST TOMOSYNTHESIS BI: CPT | Performed by: FAMILY MEDICINE

## 2023-01-26 ENCOUNTER — TELEPHONE (OUTPATIENT)
Dept: FAMILY MEDICINE CLINIC | Facility: CLINIC | Age: 73
End: 2023-01-26

## 2023-03-27 ENCOUNTER — OFFICE VISIT (OUTPATIENT)
Dept: FAMILY MEDICINE CLINIC | Facility: CLINIC | Age: 73
End: 2023-03-27

## 2023-03-27 VITALS
HEART RATE: 69 BPM | HEIGHT: 65 IN | WEIGHT: 143.38 LBS | DIASTOLIC BLOOD PRESSURE: 85 MMHG | BODY MASS INDEX: 23.89 KG/M2 | SYSTOLIC BLOOD PRESSURE: 132 MMHG

## 2023-03-27 DIAGNOSIS — I05.0 MITRAL VALVE STENOSIS, UNSPECIFIED ETIOLOGY: Primary | ICD-10-CM

## 2023-03-27 DIAGNOSIS — T45.2X1D POISONING BY VITAMIN D, ACCIDENTAL OR UNINTENTIONAL, SUBSEQUENT ENCOUNTER: ICD-10-CM

## 2023-03-27 DIAGNOSIS — R73.03 PREDIABETES: ICD-10-CM

## 2023-03-27 DIAGNOSIS — I10 ESSENTIAL HYPERTENSION: ICD-10-CM

## 2023-03-27 DIAGNOSIS — Z78.0 POST-MENOPAUSAL: ICD-10-CM

## 2023-03-27 PROCEDURE — 3075F SYST BP GE 130 - 139MM HG: CPT | Performed by: FAMILY MEDICINE

## 2023-03-27 PROCEDURE — 3008F BODY MASS INDEX DOCD: CPT | Performed by: FAMILY MEDICINE

## 2023-03-27 PROCEDURE — 3079F DIAST BP 80-89 MM HG: CPT | Performed by: FAMILY MEDICINE

## 2023-03-27 PROCEDURE — 99214 OFFICE O/P EST MOD 30 MIN: CPT | Performed by: FAMILY MEDICINE

## 2023-05-17 ENCOUNTER — LAB ENCOUNTER (OUTPATIENT)
Dept: LAB | Age: 73
End: 2023-05-17
Attending: FAMILY MEDICINE
Payer: MEDICARE

## 2023-05-17 ENCOUNTER — HOSPITAL ENCOUNTER (OUTPATIENT)
Dept: BONE DENSITY | Age: 73
Discharge: HOME | End: 2023-05-17
Attending: FAMILY MEDICINE
Payer: MEDICARE

## 2023-05-17 DIAGNOSIS — R73.03 PREDIABETES: ICD-10-CM

## 2023-05-17 DIAGNOSIS — T45.2X1D POISONING BY VITAMIN D, ACCIDENTAL OR UNINTENTIONAL, SUBSEQUENT ENCOUNTER: ICD-10-CM

## 2023-05-17 DIAGNOSIS — Z78.0 POST-MENOPAUSAL: ICD-10-CM

## 2023-05-17 LAB
EST. AVERAGE GLUCOSE BLD GHB EST-MCNC: 131 MG/DL (ref 68–126)
HBA1C MFR BLD: 6.2 % (ref ?–5.7)
VIT D+METAB SERPL-MCNC: 56.2 NG/ML (ref 30–100)

## 2023-05-17 PROCEDURE — 82306 VITAMIN D 25 HYDROXY: CPT

## 2023-05-17 PROCEDURE — 83036 HEMOGLOBIN GLYCOSYLATED A1C: CPT

## 2023-05-17 PROCEDURE — 36415 COLL VENOUS BLD VENIPUNCTURE: CPT

## 2023-05-17 PROCEDURE — 77080 DXA BONE DENSITY AXIAL: CPT | Performed by: FAMILY MEDICINE

## 2023-05-18 NOTE — PROGRESS NOTES
Still in the pre-diabetes range and vitamin D levels are back to normal range.  Will discuss at upcoming appointment. - Dr. Arthur Nunez

## 2023-05-22 NOTE — PROGRESS NOTES
Scan shows mild bone loss called osteopenia.  Continue regular weight bearing exercise like walking and add light weight for muscle building to support bones. - Dr. Payton Lozada

## 2023-05-31 ENCOUNTER — OFFICE VISIT (OUTPATIENT)
Dept: FAMILY MEDICINE CLINIC | Facility: CLINIC | Age: 73
End: 2023-05-31

## 2023-05-31 ENCOUNTER — TELEPHONE (OUTPATIENT)
Dept: FAMILY MEDICINE CLINIC | Facility: CLINIC | Age: 73
End: 2023-05-31

## 2023-05-31 VITALS
HEART RATE: 73 BPM | SYSTOLIC BLOOD PRESSURE: 126 MMHG | HEIGHT: 65 IN | DIASTOLIC BLOOD PRESSURE: 75 MMHG | BODY MASS INDEX: 23.39 KG/M2 | WEIGHT: 140.38 LBS

## 2023-05-31 DIAGNOSIS — Z00.00 ENCOUNTER FOR ANNUAL HEALTH EXAMINATION: ICD-10-CM

## 2023-05-31 DIAGNOSIS — E55.9 VITAMIN D DEFICIENCY: ICD-10-CM

## 2023-05-31 DIAGNOSIS — Z12.31 VISIT FOR SCREENING MAMMOGRAM: ICD-10-CM

## 2023-05-31 DIAGNOSIS — R73.03 PREDIABETES: ICD-10-CM

## 2023-05-31 DIAGNOSIS — M81.0 AGE-RELATED OSTEOPOROSIS WITHOUT CURRENT PATHOLOGICAL FRACTURE: ICD-10-CM

## 2023-05-31 DIAGNOSIS — I10 ESSENTIAL HYPERTENSION: Chronic | ICD-10-CM

## 2023-05-31 DIAGNOSIS — I34.2 NONRHEUMATIC MITRAL VALVE STENOSIS: Primary | ICD-10-CM

## 2023-05-31 DIAGNOSIS — I25.10 CORONARY ARTERY DISEASE INVOLVING NATIVE HEART WITHOUT ANGINA PECTORIS, UNSPECIFIED VESSEL OR LESION TYPE: ICD-10-CM

## 2023-05-31 DIAGNOSIS — M17.11 PRIMARY OSTEOARTHRITIS OF RIGHT KNEE: ICD-10-CM

## 2023-05-31 PROCEDURE — 3078F DIAST BP <80 MM HG: CPT | Performed by: FAMILY MEDICINE

## 2023-05-31 PROCEDURE — 3008F BODY MASS INDEX DOCD: CPT | Performed by: FAMILY MEDICINE

## 2023-05-31 PROCEDURE — 1159F MED LIST DOCD IN RCRD: CPT | Performed by: FAMILY MEDICINE

## 2023-05-31 PROCEDURE — 1170F FXNL STATUS ASSESSED: CPT | Performed by: FAMILY MEDICINE

## 2023-05-31 PROCEDURE — 1125F AMNT PAIN NOTED PAIN PRSNT: CPT | Performed by: FAMILY MEDICINE

## 2023-05-31 PROCEDURE — 3074F SYST BP LT 130 MM HG: CPT | Performed by: FAMILY MEDICINE

## 2023-05-31 PROCEDURE — 96160 PT-FOCUSED HLTH RISK ASSMT: CPT | Performed by: FAMILY MEDICINE

## 2023-05-31 PROCEDURE — 99213 OFFICE O/P EST LOW 20 MIN: CPT | Performed by: FAMILY MEDICINE

## 2023-05-31 PROCEDURE — G0439 PPPS, SUBSEQ VISIT: HCPCS | Performed by: FAMILY MEDICINE

## 2023-05-31 RX ORDER — LOSARTAN POTASSIUM 50 MG/1
50 TABLET ORAL DAILY
Qty: 90 TABLET | Refills: 4 | Status: SHIPPED | OUTPATIENT
Start: 2023-05-31

## 2023-05-31 RX ORDER — METOPROLOL SUCCINATE 100 MG/1
100 TABLET, EXTENDED RELEASE ORAL DAILY
Qty: 90 TABLET | Refills: 4 | Status: SHIPPED | OUTPATIENT
Start: 2023-05-31

## 2023-05-31 RX ORDER — AMLODIPINE BESYLATE 5 MG/1
5 TABLET ORAL DAILY
Qty: 90 TABLET | Refills: 4 | Status: SHIPPED | OUTPATIENT
Start: 2023-05-31

## 2023-05-31 RX ORDER — ATORVASTATIN CALCIUM 40 MG/1
TABLET, FILM COATED ORAL
Qty: 90 TABLET | Refills: 4 | Status: SHIPPED | OUTPATIENT
Start: 2023-05-31

## 2023-06-06 ENCOUNTER — OFFICE VISIT (OUTPATIENT)
Dept: OTOLARYNGOLOGY | Facility: CLINIC | Age: 73
End: 2023-06-06

## 2023-06-06 VITALS — HEIGHT: 65 IN | BODY MASS INDEX: 23.32 KG/M2 | WEIGHT: 140 LBS

## 2023-06-06 DIAGNOSIS — H61.23 CERUMEN DEBRIS ON TYMPANIC MEMBRANE OF BOTH EARS: Primary | ICD-10-CM

## 2023-06-06 PROCEDURE — 3008F BODY MASS INDEX DOCD: CPT | Performed by: SPECIALIST

## 2023-06-06 PROCEDURE — 1160F RVW MEDS BY RX/DR IN RCRD: CPT | Performed by: SPECIALIST

## 2023-06-06 PROCEDURE — 1159F MED LIST DOCD IN RCRD: CPT | Performed by: SPECIALIST

## 2023-06-06 PROCEDURE — 69210 REMOVE IMPACTED EAR WAX UNI: CPT | Performed by: SPECIALIST

## 2023-06-06 NOTE — PROGRESS NOTES
Ears = bilateral cerumen occlussions. Fully cleaned under microscope using instrumentation and suctioning. Normal tympanic membranes. Follow up in 6 months time, sooner if problems.

## 2023-06-08 ENCOUNTER — TELEPHONE (OUTPATIENT)
Dept: FAMILY MEDICINE CLINIC | Facility: CLINIC | Age: 73
End: 2023-06-08

## 2023-06-08 NOTE — TELEPHONE ENCOUNTER
DR. Madonna Mehta: please review. Pharmacist, Bubba Hodges wants to verify dosage. Patient is asking for RX amlodipine and metoprolol. Noticed dosages are different from what she was previously on per Dr Rachell Rodriguez. Patient was on amlodipine 2.5mg; last RX given Feb. 2023 by Dr Tomasa Levy  Also was on Metoprolol ER 50mg. Pharmacist received Rx from DR Madonna Mehta 5/31/23 for metorporol ER 100mg and amlodipine 5mg. I spoke to patient. She states she was cutting the amlodipine in half (which has been difficult because tablet is not scored). She has been cutting the 100mg in half. She states Dr Tomasa Levy had changed her dosages and perhaps Dr Madonna Mehta was not aware of this. I called office of Dr Tomasa Levy office. They confirm dosages have been changed as of October 2022. Dr. Tomasa Levy changed her dosage to metoprolol succinate ER 50mg 1 daily , and amlodipine 2.5mg.     I advised cardiology to send new RX refill from DR Tomasa Levy with updated dosage changes. Will send this note to Dr Madonna Mehta to review. And if ok, we can update pharmacist and patient that cardiology will send new rx.  We can also update medication list.

## 2023-06-08 NOTE — TELEPHONE ENCOUNTER
Yes That is fine can update medication list and pharmacy. And cardiologist to renew med only. Remove my name from those refills at pharmacy.

## 2023-06-10 RX ORDER — AMLODIPINE BESYLATE 2.5 MG/1
2.5 TABLET ORAL DAILY
COMMUNITY
Start: 2023-02-01

## 2023-06-10 NOTE — TELEPHONE ENCOUNTER
Advised patient of the note below. She asked if the medication amlodipine was lowered. Advised to contact Dr. Ogden Neither office as he will be the handling provider.

## 2023-06-10 NOTE — TELEPHONE ENCOUNTER
Called pharmacy to make sure they are aware cardiology will be handling new rx going forward for amlodipine 2.5mg, and metoprolol ER 50mg. Pharmacist made a note and cancelled rxs from [address] [facility] [address] dated 5/31/23. I left a detailed message on patient's personal voicemail. Patient to conact Dr Shawanda Weinberg for any further refills. On 6/8/23 I did ask the office of Dr Shawanda Weinberg to send new refills.

## 2023-09-29 ENCOUNTER — TELEPHONE (OUTPATIENT)
Dept: CASE MANAGEMENT | Age: 73
End: 2023-09-29

## 2023-09-29 DIAGNOSIS — R00.2 INTERMITTENT PALPITATIONS: ICD-10-CM

## 2023-09-29 DIAGNOSIS — I05.0 MITRAL VALVE STENOSIS, UNSPECIFIED ETIOLOGY: Primary | ICD-10-CM

## 2023-09-29 NOTE — TELEPHONE ENCOUNTER
Dr. Cynthia Mcclure,     Patient needs referral to see Dr. Michael Freeman 10/4/23. Pended referral please review diagnosis and sign off if you agree. Thank you.   Gabriele Ren

## 2023-10-04 ENCOUNTER — MED REC SCAN ONLY (OUTPATIENT)
Dept: FAMILY MEDICINE CLINIC | Facility: CLINIC | Age: 73
End: 2023-10-04

## 2023-12-06 ENCOUNTER — OFFICE VISIT (OUTPATIENT)
Dept: FAMILY MEDICINE CLINIC | Facility: CLINIC | Age: 73
End: 2023-12-06

## 2023-12-06 ENCOUNTER — LAB ENCOUNTER (OUTPATIENT)
Dept: LAB | Age: 73
End: 2023-12-06
Attending: FAMILY MEDICINE
Payer: MEDICARE

## 2023-12-06 VITALS
SYSTOLIC BLOOD PRESSURE: 130 MMHG | WEIGHT: 138.63 LBS | BODY MASS INDEX: 23.1 KG/M2 | DIASTOLIC BLOOD PRESSURE: 72 MMHG | HEIGHT: 65 IN | HEART RATE: 55 BPM

## 2023-12-06 DIAGNOSIS — E55.9 VITAMIN D DEFICIENCY: ICD-10-CM

## 2023-12-06 DIAGNOSIS — I10 ESSENTIAL HYPERTENSION: Chronic | ICD-10-CM

## 2023-12-06 DIAGNOSIS — R94.6 ABNORMAL THYROID FUNCTION TEST: ICD-10-CM

## 2023-12-06 DIAGNOSIS — H91.93 BILATERAL HEARING LOSS, UNSPECIFIED HEARING LOSS TYPE: ICD-10-CM

## 2023-12-06 DIAGNOSIS — I35.0 AORTIC VALVE STENOSIS, ETIOLOGY OF CARDIAC VALVE DISEASE UNSPECIFIED: Primary | ICD-10-CM

## 2023-12-06 DIAGNOSIS — I25.10 CORONARY ARTERY DISEASE INVOLVING NATIVE HEART WITHOUT ANGINA PECTORIS, UNSPECIFIED VESSEL OR LESION TYPE: ICD-10-CM

## 2023-12-06 DIAGNOSIS — I10 ESSENTIAL HYPERTENSION: ICD-10-CM

## 2023-12-06 DIAGNOSIS — I34.2 NONRHEUMATIC MITRAL VALVE STENOSIS: ICD-10-CM

## 2023-12-06 DIAGNOSIS — R73.03 PREDIABETES: ICD-10-CM

## 2023-12-06 DIAGNOSIS — M81.0 AGE-RELATED OSTEOPOROSIS WITHOUT CURRENT PATHOLOGICAL FRACTURE: ICD-10-CM

## 2023-12-06 DIAGNOSIS — M17.11 PRIMARY OSTEOARTHRITIS OF RIGHT KNEE: ICD-10-CM

## 2023-12-06 LAB
ALBUMIN SERPL-MCNC: 4.4 G/DL (ref 3.2–4.8)
ALBUMIN/GLOB SERPL: 1.5 {RATIO} (ref 1–2)
ALP LIVER SERPL-CCNC: 81 U/L
ALT SERPL-CCNC: 29 U/L
ANION GAP SERPL CALC-SCNC: 6 MMOL/L (ref 0–18)
AST SERPL-CCNC: 24 U/L (ref ?–34)
BASOPHILS # BLD AUTO: 0.03 X10(3) UL (ref 0–0.2)
BASOPHILS NFR BLD AUTO: 0.4 %
BILIRUB SERPL-MCNC: 0.5 MG/DL (ref 0.2–1.1)
BUN BLD-MCNC: 13 MG/DL (ref 9–23)
BUN/CREAT SERPL: 18.3 (ref 10–20)
CALCIUM BLD-MCNC: 9.3 MG/DL (ref 8.7–10.4)
CHLORIDE SERPL-SCNC: 106 MMOL/L (ref 98–112)
CHOLEST SERPL-MCNC: 127 MG/DL (ref ?–200)
CO2 SERPL-SCNC: 28 MMOL/L (ref 21–32)
CREAT BLD-MCNC: 0.71 MG/DL
DEPRECATED RDW RBC AUTO: 47.1 FL (ref 35.1–46.3)
EGFRCR SERPLBLD CKD-EPI 2021: 90 ML/MIN/1.73M2 (ref 60–?)
EOSINOPHIL # BLD AUTO: 0.06 X10(3) UL (ref 0–0.7)
EOSINOPHIL NFR BLD AUTO: 0.8 %
ERYTHROCYTE [DISTWIDTH] IN BLOOD BY AUTOMATED COUNT: 16.7 % (ref 11–15)
FASTING PATIENT LIPID ANSWER: YES
FASTING STATUS PATIENT QL REPORTED: YES
GLOBULIN PLAS-MCNC: 3 G/DL (ref 2.8–4.4)
GLUCOSE BLD-MCNC: 101 MG/DL (ref 70–99)
HCT VFR BLD AUTO: 35.9 %
HDLC SERPL-MCNC: 48 MG/DL (ref 40–59)
HGB BLD-MCNC: 11.2 G/DL
IMM GRANULOCYTES # BLD AUTO: 0.02 X10(3) UL (ref 0–1)
IMM GRANULOCYTES NFR BLD: 0.3 %
LDLC SERPL CALC-MCNC: 65 MG/DL (ref ?–100)
LYMPHOCYTES # BLD AUTO: 1.55 X10(3) UL (ref 1–4)
LYMPHOCYTES NFR BLD AUTO: 20.2 %
MCH RBC QN AUTO: 24.4 PG (ref 26–34)
MCHC RBC AUTO-ENTMCNC: 31.2 G/DL (ref 31–37)
MCV RBC AUTO: 78.2 FL
MONOCYTES # BLD AUTO: 0.5 X10(3) UL (ref 0.1–1)
MONOCYTES NFR BLD AUTO: 6.5 %
NEUTROPHILS # BLD AUTO: 5.5 X10 (3) UL (ref 1.5–7.7)
NEUTROPHILS # BLD AUTO: 5.5 X10(3) UL (ref 1.5–7.7)
NEUTROPHILS NFR BLD AUTO: 71.8 %
NONHDLC SERPL-MCNC: 79 MG/DL (ref ?–130)
OSMOLALITY SERPL CALC.SUM OF ELEC: 290 MOSM/KG (ref 275–295)
PLATELET # BLD AUTO: 195 10(3)UL (ref 150–450)
POTASSIUM SERPL-SCNC: 4 MMOL/L (ref 3.5–5.1)
PROT SERPL-MCNC: 7.4 G/DL (ref 5.7–8.2)
RBC # BLD AUTO: 4.59 X10(6)UL
SODIUM SERPL-SCNC: 140 MMOL/L (ref 136–145)
T3FREE SERPL-MCNC: 3.71 PG/ML (ref 2.4–4.2)
T4 FREE SERPL-MCNC: 1.3 NG/DL (ref 0.8–1.7)
TRIGL SERPL-MCNC: 70 MG/DL (ref 30–149)
TSI SER-ACNC: 0.29 MIU/ML (ref 0.55–4.78)
VIT D+METAB SERPL-MCNC: 56.6 NG/ML (ref 30–100)
VLDLC SERPL CALC-MCNC: 10 MG/DL (ref 0–30)
WBC # BLD AUTO: 7.7 X10(3) UL (ref 4–11)

## 2023-12-06 PROCEDURE — 3008F BODY MASS INDEX DOCD: CPT | Performed by: FAMILY MEDICINE

## 2023-12-06 PROCEDURE — 84443 ASSAY THYROID STIM HORMONE: CPT

## 2023-12-06 PROCEDURE — 3075F SYST BP GE 130 - 139MM HG: CPT | Performed by: FAMILY MEDICINE

## 2023-12-06 PROCEDURE — 99214 OFFICE O/P EST MOD 30 MIN: CPT | Performed by: FAMILY MEDICINE

## 2023-12-06 PROCEDURE — 84445 ASSAY OF TSI GLOBULIN: CPT

## 2023-12-06 PROCEDURE — 36415 COLL VENOUS BLD VENIPUNCTURE: CPT

## 2023-12-06 PROCEDURE — 84481 FREE ASSAY (FT-3): CPT

## 2023-12-06 PROCEDURE — 3078F DIAST BP <80 MM HG: CPT | Performed by: FAMILY MEDICINE

## 2023-12-06 PROCEDURE — 1159F MED LIST DOCD IN RCRD: CPT | Performed by: FAMILY MEDICINE

## 2023-12-06 PROCEDURE — 80061 LIPID PANEL: CPT

## 2023-12-06 PROCEDURE — 82306 VITAMIN D 25 HYDROXY: CPT

## 2023-12-06 PROCEDURE — 1126F AMNT PAIN NOTED NONE PRSNT: CPT | Performed by: FAMILY MEDICINE

## 2023-12-06 PROCEDURE — 85025 COMPLETE CBC W/AUTO DIFF WBC: CPT

## 2023-12-06 PROCEDURE — 84439 ASSAY OF FREE THYROXINE: CPT

## 2023-12-06 PROCEDURE — 80053 COMPREHEN METABOLIC PANEL: CPT

## 2023-12-07 DIAGNOSIS — R94.6 ABNORMAL THYROID FUNCTION TEST: Primary | ICD-10-CM

## 2023-12-10 LAB — THY STIM IMMUNO: <0.1 IU/L

## 2023-12-19 ENCOUNTER — OFFICE VISIT (OUTPATIENT)
Dept: OTOLARYNGOLOGY | Facility: CLINIC | Age: 73
End: 2023-12-19

## 2023-12-19 VITALS — BODY MASS INDEX: 22.99 KG/M2 | WEIGHT: 138 LBS | HEIGHT: 65 IN

## 2023-12-19 DIAGNOSIS — H61.23 CERUMEN DEBRIS ON TYMPANIC MEMBRANE OF BOTH EARS: Primary | ICD-10-CM

## 2023-12-19 DIAGNOSIS — H91.13 PRESBYCUSIS OF BOTH EARS: ICD-10-CM

## 2023-12-19 PROCEDURE — 1159F MED LIST DOCD IN RCRD: CPT | Performed by: SPECIALIST

## 2023-12-19 PROCEDURE — 1160F RVW MEDS BY RX/DR IN RCRD: CPT | Performed by: SPECIALIST

## 2023-12-19 PROCEDURE — 69210 REMOVE IMPACTED EAR WAX UNI: CPT | Performed by: SPECIALIST

## 2023-12-19 PROCEDURE — 3008F BODY MASS INDEX DOCD: CPT | Performed by: SPECIALIST

## 2023-12-19 RX ORDER — METOPROLOL SUCCINATE 100 MG/1
100 TABLET, EXTENDED RELEASE ORAL DAILY
COMMUNITY
Start: 2023-10-04

## 2023-12-19 NOTE — PATIENT INSTRUCTIONS
When was fully cleaned from both your ears right greater than left. Continue wearing binaural hearing aids. Follow-up in 6 months time, sooner if problems.

## 2024-02-06 ENCOUNTER — HOSPITAL ENCOUNTER (OUTPATIENT)
Dept: MAMMOGRAPHY | Age: 74
Discharge: HOME | End: 2024-02-06
Attending: FAMILY MEDICINE
Payer: MEDICARE

## 2024-02-06 DIAGNOSIS — Z12.31 VISIT FOR SCREENING MAMMOGRAM: ICD-10-CM

## 2024-02-06 PROCEDURE — 77063 BREAST TOMOSYNTHESIS BI: CPT | Performed by: FAMILY MEDICINE

## 2024-02-06 PROCEDURE — 77067 SCR MAMMO BI INCL CAD: CPT | Performed by: FAMILY MEDICINE

## 2024-03-25 ENCOUNTER — TELEPHONE (OUTPATIENT)
Dept: CASE MANAGEMENT | Age: 74
End: 2024-03-25

## 2024-03-25 DIAGNOSIS — R00.2 PALPITATIONS: ICD-10-CM

## 2024-03-25 DIAGNOSIS — I05.0 RHEUMATIC MITRAL STENOSIS: Primary | ICD-10-CM

## 2024-03-25 NOTE — TELEPHONE ENCOUNTER
Dr. Smith,     Patient called requesting referral to Dr. Torres.    Pended referral please review diagnosis and sign off if you agree.    Thank you.  Cynthia Hopson  [facility]

## 2024-04-01 NOTE — OCCUPATIONAL THERAPY NOTE
Medication: Acyclovir   Last office visit date: 11/7/23  Medication Refill Protocol Failed.  Not on active medication list.     OCCUPATIONAL THERAPY TREATMENT NOTE - INPATIENT    Room Number: 626/413-D         Presenting Problem: (R TKA )     Problem List  Principal Problem:    Osteoarthritis of right knee  Active Problems:    Essential hypertension    Syncope      OCCUPATIONAL THE knee)  Management Techniques:  Activity promotion     ACTIVITY TOLERANCE                         O2 SATURATIONS                ACTIVITIES OF DAILY LIVING ASSESSMENT  AM-PAC ‘6-Clicks’ Inpatient Daily Activity Short Form  How much help from another person do  Comment: Achieved     Patient will complete self care tasks in standing at sink level with SBA.     Comment: Achieved tolerating ~2 minutes supported standing with SBA    Patient will complete toileting with SBA.   Comment:Achieved    New GOALS:  Pt will p

## 2024-04-10 ENCOUNTER — TELEPHONE (OUTPATIENT)
Dept: FAMILY MEDICINE CLINIC | Facility: CLINIC | Age: 74
End: 2024-04-10

## 2024-04-10 DIAGNOSIS — R94.6 ABNORMAL THYROID FUNCTION TEST: Primary | ICD-10-CM

## 2024-04-10 NOTE — TELEPHONE ENCOUNTER
Received lab results from cardiologist. Continue to have abnormal thyroid labs. I ordered an ultrasound of her thyroid. Please have done before next appt with me.

## 2024-04-10 NOTE — TELEPHONE ENCOUNTER
Called patient with [employer]  Berenice  ID# [id number]      Left Voicemail to call back our office. Office phone number provided with office telephone hours.   1st attempt

## 2024-04-11 NOTE — TELEPHONE ENCOUNTER
Spoke with patient regarding recommendations from provider.  Patient verbalized understanding.  Patient transferred to central scheduling for appt of US thyroid.

## 2024-05-10 ENCOUNTER — HOSPITAL ENCOUNTER (OUTPATIENT)
Dept: ULTRASOUND IMAGING | Age: 74
Discharge: HOME | End: 2024-05-10
Attending: FAMILY MEDICINE
Payer: MEDICARE

## 2024-05-10 DIAGNOSIS — R94.6 ABNORMAL THYROID FUNCTION TEST: ICD-10-CM

## 2024-05-10 PROCEDURE — 76536 US EXAM OF HEAD AND NECK: CPT | Performed by: FAMILY MEDICINE

## 2024-05-23 ENCOUNTER — OFFICE VISIT (OUTPATIENT)
Dept: FAMILY MEDICINE CLINIC | Facility: CLINIC | Age: 74
End: 2024-05-23

## 2024-05-23 ENCOUNTER — LAB ENCOUNTER (OUTPATIENT)
Dept: LAB | Age: 74
End: 2024-05-23
Attending: FAMILY MEDICINE

## 2024-05-23 VITALS
BODY MASS INDEX: 22.3 KG/M2 | SYSTOLIC BLOOD PRESSURE: 127 MMHG | HEART RATE: 54 BPM | WEIGHT: 133.81 LBS | DIASTOLIC BLOOD PRESSURE: 72 MMHG | HEIGHT: 65 IN

## 2024-05-23 DIAGNOSIS — E55.9 VITAMIN D DEFICIENCY: ICD-10-CM

## 2024-05-23 DIAGNOSIS — R73.03 PREDIABETES: ICD-10-CM

## 2024-05-23 DIAGNOSIS — Z12.31 SCREENING MAMMOGRAM FOR BREAST CANCER: ICD-10-CM

## 2024-05-23 DIAGNOSIS — M17.11 PRIMARY OSTEOARTHRITIS OF RIGHT KNEE: ICD-10-CM

## 2024-05-23 DIAGNOSIS — Z00.00 ENCOUNTER FOR ANNUAL HEALTH EXAMINATION: ICD-10-CM

## 2024-05-23 DIAGNOSIS — I25.10 CORONARY ARTERY DISEASE INVOLVING NATIVE HEART WITHOUT ANGINA PECTORIS, UNSPECIFIED VESSEL OR LESION TYPE: ICD-10-CM

## 2024-05-23 DIAGNOSIS — E55.9 VITAMIN D DEFICIENCY: Primary | ICD-10-CM

## 2024-05-23 DIAGNOSIS — I34.2 NONRHEUMATIC MITRAL VALVE STENOSIS: ICD-10-CM

## 2024-05-23 DIAGNOSIS — I10 ESSENTIAL HYPERTENSION: ICD-10-CM

## 2024-05-23 DIAGNOSIS — I10 ESSENTIAL HYPERTENSION: Chronic | ICD-10-CM

## 2024-05-23 DIAGNOSIS — M85.80 OSTEOPENIA, UNSPECIFIED LOCATION: ICD-10-CM

## 2024-05-23 LAB
ALBUMIN SERPL-MCNC: 4.3 G/DL (ref 3.2–4.8)
ALBUMIN/GLOB SERPL: 1.4 {RATIO} (ref 1–2)
ALP LIVER SERPL-CCNC: 80 U/L
ALT SERPL-CCNC: 30 U/L
ANION GAP SERPL CALC-SCNC: 9 MMOL/L (ref 0–18)
AST SERPL-CCNC: 27 U/L (ref ?–34)
BASOPHILS # BLD AUTO: 0.04 X10(3) UL (ref 0–0.2)
BASOPHILS NFR BLD AUTO: 0.6 %
BILIRUB SERPL-MCNC: 0.5 MG/DL (ref 0.2–1.1)
BUN BLD-MCNC: 14 MG/DL (ref 9–23)
BUN/CREAT SERPL: 19.7 (ref 10–20)
CALCIUM BLD-MCNC: 9.4 MG/DL (ref 8.7–10.4)
CHLORIDE SERPL-SCNC: 109 MMOL/L (ref 98–112)
CHOLEST SERPL-MCNC: 127 MG/DL (ref ?–200)
CO2 SERPL-SCNC: 26 MMOL/L (ref 21–32)
CREAT BLD-MCNC: 0.71 MG/DL
DEPRECATED RDW RBC AUTO: 47.4 FL (ref 35.1–46.3)
EGFRCR SERPLBLD CKD-EPI 2021: 89 ML/MIN/1.73M2 (ref 60–?)
EOSINOPHIL # BLD AUTO: 0.1 X10(3) UL (ref 0–0.7)
EOSINOPHIL NFR BLD AUTO: 1.4 %
ERYTHROCYTE [DISTWIDTH] IN BLOOD BY AUTOMATED COUNT: 16.6 % (ref 11–15)
EST. AVERAGE GLUCOSE BLD GHB EST-MCNC: 126 MG/DL (ref 68–126)
FASTING PATIENT LIPID ANSWER: YES
FASTING STATUS PATIENT QL REPORTED: YES
GLOBULIN PLAS-MCNC: 3.1 G/DL (ref 2–3.5)
GLUCOSE BLD-MCNC: 97 MG/DL (ref 70–99)
HBA1C MFR BLD: 6 % (ref ?–5.7)
HCT VFR BLD AUTO: 37.5 %
HDLC SERPL-MCNC: 50 MG/DL (ref 40–59)
HGB BLD-MCNC: 11.5 G/DL
IMM GRANULOCYTES # BLD AUTO: 0.02 X10(3) UL (ref 0–1)
IMM GRANULOCYTES NFR BLD: 0.3 %
LDLC SERPL CALC-MCNC: 64 MG/DL (ref ?–100)
LYMPHOCYTES # BLD AUTO: 1.66 X10(3) UL (ref 1–4)
LYMPHOCYTES NFR BLD AUTO: 23.9 %
MCH RBC QN AUTO: 24.2 PG (ref 26–34)
MCHC RBC AUTO-ENTMCNC: 30.7 G/DL (ref 31–37)
MCV RBC AUTO: 78.9 FL
MONOCYTES # BLD AUTO: 0.49 X10(3) UL (ref 0.1–1)
MONOCYTES NFR BLD AUTO: 7.1 %
NEUTROPHILS # BLD AUTO: 4.64 X10 (3) UL (ref 1.5–7.7)
NEUTROPHILS # BLD AUTO: 4.64 X10(3) UL (ref 1.5–7.7)
NEUTROPHILS NFR BLD AUTO: 66.7 %
NONHDLC SERPL-MCNC: 77 MG/DL (ref ?–130)
OSMOLALITY SERPL CALC.SUM OF ELEC: 298 MOSM/KG (ref 275–295)
PLATELET # BLD AUTO: 199 10(3)UL (ref 150–450)
POTASSIUM SERPL-SCNC: 4 MMOL/L (ref 3.5–5.1)
PROT SERPL-MCNC: 7.4 G/DL (ref 5.7–8.2)
RBC # BLD AUTO: 4.75 X10(6)UL
SODIUM SERPL-SCNC: 144 MMOL/L (ref 136–145)
T3FREE SERPL-MCNC: 3.18 PG/ML (ref 2.4–4.2)
T4 FREE SERPL-MCNC: 1.2 NG/DL (ref 0.8–1.7)
TRIGL SERPL-MCNC: 63 MG/DL (ref 30–149)
TSI SER-ACNC: 0.34 MIU/ML (ref 0.55–4.78)
VIT B12 SERPL-MCNC: 1033 PG/ML (ref 211–911)
VIT D+METAB SERPL-MCNC: 55.6 NG/ML (ref 30–100)
VLDLC SERPL CALC-MCNC: 9 MG/DL (ref 0–30)
WBC # BLD AUTO: 7 X10(3) UL (ref 4–11)

## 2024-05-23 PROCEDURE — 80061 LIPID PANEL: CPT

## 2024-05-23 PROCEDURE — 83036 HEMOGLOBIN GLYCOSYLATED A1C: CPT

## 2024-05-23 PROCEDURE — 84481 FREE ASSAY (FT-3): CPT

## 2024-05-23 PROCEDURE — 36415 COLL VENOUS BLD VENIPUNCTURE: CPT

## 2024-05-23 PROCEDURE — 84443 ASSAY THYROID STIM HORMONE: CPT

## 2024-05-23 PROCEDURE — 84439 ASSAY OF FREE THYROXINE: CPT

## 2024-05-23 PROCEDURE — 80053 COMPREHEN METABOLIC PANEL: CPT

## 2024-05-23 PROCEDURE — 85025 COMPLETE CBC W/AUTO DIFF WBC: CPT

## 2024-05-23 PROCEDURE — 82306 VITAMIN D 25 HYDROXY: CPT

## 2024-05-23 PROCEDURE — 82607 VITAMIN B-12: CPT

## 2024-05-23 RX ORDER — ATORVASTATIN CALCIUM 40 MG/1
TABLET, FILM COATED ORAL
Qty: 90 TABLET | Refills: 4 | Status: SHIPPED | OUTPATIENT
Start: 2024-05-23

## 2024-05-23 NOTE — PROGRESS NOTES
Subjective:   Narcisa Teague is a 73 year old female who presents for a Medicare Subsequent Annual Wellness visit (Pt already had Initial Annual Wellness) and scheduled follow up of multiple significant but stable problems.   Patient stopped the water pill because urinating too much. Legs are not swelling.   Walking regularly with dog. Trying to eat healthy overall.   Seeing Dr. Torres for cardiologist.   History/Other:   Fall Risk Assessment:   She has been screened for Falls and is low risk.      Cognitive Assessment:   She had a completely normal cognitive assessment - see flowsheet entries     Functional Ability/Status:   Narcisa Teague has some abnormal functions as listed below:  She has difficulties Affording Meds based on screening of functional status.       Depression Screening (PHQ-2/PHQ-9): PHQ-2 SCORE: 0  , done 5/23/2024        Advanced Directives:   She does NOT have a Living Will. [Do you have a living will?: No]  She does NOT have a Power of  for Health Care. [Do you have a healthcare power of ?: No]  Discussed Advance Care Planning with patient (and family/surrogate if present). Standard forms made available to patient in After Visit Summary.      Patient Active Problem List   Diagnosis    Osteoarthritis of right knee    Essential hypertension    Age-related osteoporosis without current pathological fracture    Nonrheumatic mitral valve stenosis    Coronary artery disease involving native heart without angina pectoris    Prediabetes    Osteopenia     Allergies:  She has No Known Allergies.    Current Medications:  Outpatient Medications Marked as Taking for the 5/23/24 encounter (Office Visit) with Jesenia Smith MD   Medication Sig    metoprolol succinate  MG Oral Tablet 24 Hr Take 1 tablet (100 mg total) by mouth daily.    amLODIPine 2.5 MG Oral Tab Take 1 tablet (2.5 mg total) by mouth daily.    atorvastatin 40 MG Oral Tab Take 1 tablet every day by oral route.     losartan 50 MG Oral Tab Take 1 tablet (50 mg total) by mouth daily. (Patient taking differently: Take 2 tablets (100 mg total) by mouth daily.)    aspirin 81 MG Oral Chew Tab aspirin 81 mg chewable tablet   Chew 1 tablet every day by oral route.       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  Surgical History:  She  has a past surgical history that includes ; knee replacement surgery (Right, 2019); excis tumor/avm,deep,hand/fingr (Right); and colonoscopy ().   Family History:  Her Family history is unknown by patient.  Social History:  She  reports that she has never smoked. She has never used smokeless tobacco. She reports that she does not drink alcohol and does not use drugs.    Tobacco:  She has never smoked tobacco.    CAGE Alcohol Screen:   CAGE screening score of 0 on 2024, showing low risk of alcohol abuse.      Patient Care Team:  Jesenia Smith MD as PCP - General (Family Medicine)    Review of Systems     Negative     Objective:   Physical Exam  Constitutional:       Appearance: Normal appearance. She is normal weight.   HENT:      Head: Normocephalic and atraumatic.      Right Ear: Tympanic membrane normal.      Left Ear: Tympanic membrane normal.      Mouth/Throat:      Mouth: Mucous membranes are moist.   Cardiovascular:      Rate and Rhythm: Normal rate and regular rhythm.      Pulses: Normal pulses.      Heart sounds: Normal heart sounds.   Pulmonary:      Effort: Pulmonary effort is normal.      Breath sounds: Normal breath sounds.   Abdominal:      General: Abdomen is flat. Bowel sounds are normal.      Palpations: Abdomen is soft.   Musculoskeletal:         General: Normal range of motion.   Neurological:      Mental Status: She is alert and oriented to person, place, and time.   Psychiatric:         Mood and  Affect: Mood normal.         Behavior: Behavior normal.       /72   Pulse 54   Ht 5' 5\" (1.651 m)   Wt 133 lb 12.8 oz (60.7 kg)   BMI 22.27 kg/m²  Estimated body mass index is 22.27 kg/m² as calculated from the following:    Height as of this encounter: 5' 5\" (1.651 m).    Weight as of this encounter: 133 lb 12.8 oz (60.7 kg).    Medicare Hearing Assessment:   Hearing Screening    Time taken: 5/23/2024 10:11 AM  Screening Method: Questionnaire  I have a problem hearing over the telephone: No I have trouble following the conversations when two or more people are talking at the same time: No   I have trouble understanding things on the TV: No I have to strain to understand conversations: No   I have to worry about missing the telephone ring or doorbell: No I have trouble hearing conversations in a noisy background such as a crowded room or restaurant: No   I get confused about where sounds come from: No I misunderstand some words in a sentence and need to ask people to repeat themselves: No   I especially have trouble understanding the speech of women and children: No I have trouble understanding the speaker in a large room such as at a meeting or place of Christianity: No   Many people I talk to seem to mumble (or don't speak clearly): No People get annoyed because I misunderstand what they say: No   I misunderstand what others are saying and make inappropriate responses: No I avoid social activities because I cannot hear well and fear I will reply improperly: No   Family members and friends have told me they think I may have hearing loss: No             Visual Acuity:   Right Eye Visual Acuity: Uncorrected Right Eye Chart Acuity: 20/70   Left Eye Visual Acuity: Uncorrected Left Eye Chart Acuity: 20/70   Both Eyes Visual Acuity: Uncorrected Both Eyes Chart Acuity: 20/40            Assessment & Plan:   Narcisa Teague is a 73 year old female who presents for a Medicare Assessment.     1. Vitamin D  deficiency  Due for labs. Continue supplements   - Vitamin B12; Future  - Vitamin D; Future    2. Nonrheumatic mitral valve stenosis  Stable. Followed by cards     3. Essential hypertension  Stable. Followed by cards   - Comp Metabolic Panel (14); Future  - CBC With Differential With Platelet; Future  - Lipid Panel; Future  - TSH W Reflex To Free T4; Future    4. Coronary artery disease involving native heart without angina pectoris, unspecified vessel or lesion type  Stable. Followed by cards     5. Prediabetes  Due for labs.   - Hemoglobin A1C; Future    6. Primary osteoarthritis of right knee  Stable     7. Osteopenia, unspecified location  Stable. Due for dexa next year.     8. Screening mammogram for breast cancer  Due 2/2025   - [facility] MARY 2D+3D SCREENING BILAT (CPT=77067/09142); Future    9. Encounter for annual health examination      The patient indicates understanding of these issues and agrees to the plan.  Reinforced healthy diet, lifestyle, and exercise.      No follow-ups on file.     Jesenia Smith MD, 5/23/2024     Supplementary Documentation:   General Health:  In the past six months, have you lost more than 10 pounds without trying?: 2 - No  Has your appetite been poor?: No  Type of Diet: Balanced  How does the patient maintain a good energy level?: Daily Walks  How would you describe your daily physical activity?: Moderate  How would you describe your current health state?: Good  How do you maintain positive mental well-being?: Visiting Family  On a scale of 0 to 10, with 0 being no pain and 10 being severe pain, what is your pain level?: 9 - (Severe)  In the past six months, have you experienced urine leakage?: 0-No  At any time do you feel concerned for the safety/well-being of yourself and/or your children, in your home or elsewhere?: No  Have you had any immunizations at another office such as Influenza, Hepatitis B, Tetanus, or Pneumococcal?: No       Narcisa Teague's SCREENING SCHEDULE    Tests on this list are recommended by your physician but may not be covered, or covered at this frequency, by your insurer.   Please check with your insurance carrier before scheduling to verify coverage.   PREVENTATIVE SERVICES FREQUENCY &  COVERAGE DETAILS LAST COMPLETION DATE   Diabetes Screening    Fasting Blood Sugar /  Glucose    One screening every 12 months if never tested or if previously tested but not diagnosed with pre-diabetes   One screening every 6 months if diagnosed with pre-diabetes Lab Results   Component Value Date     (H) 12/06/2023        Cardiovascular Disease Screening    Lipid Panel  Cholesterol  Lipoprotein (HDL)  Triglycerides Covered every 5 years for all Medicare beneficiaries without apparent signs or symptoms of cardiovascular disease Lab Results   Component Value Date    CHOLEST 127 12/06/2023    HDL 48 12/06/2023    LDL 65 12/06/2023    TRIG 70 12/06/2023         Electrocardiogram (EKG)   Covered if needed at Welcome to Medicare, and non-screening if indicated for medical reasons 03/02/2019      Ultrasound Screening for Abdominal Aortic Aneurysm (AAA) Covered once in a lifetime for one of the following risk factors    Men who are 65-75 years old and have ever smoked    Anyone with a family history -     Colorectal Cancer Screening  Covered for ages 50-85; only need ONE of the following:    Colonoscopy   Covered every 10 years    Covered every 2 years if patient is at high risk or previous colonoscopy was abnormal 08/17/2021    Health Maintenance   Topic Date Due    Colorectal Cancer Screening  08/17/2031       Flexible Sigmoidoscopy   Covered every 4 years -    Fecal Occult Blood Test Covered annually -   Bone Density Screening    Bone density screening    Covered every 2 years after age 65 if diagnosed with risk of osteoporosis or estrogen deficiency.    Covered yearly for long-term glucocorticoid medication use (Steroids) Last Dexa Scan:    XR DEXA BONE DENSITOMETRY  (MEK=89257) 05/17/2023      No recommendations at this time   Pap and Pelvic    Pap   Covered every 2 years for women at normal risk; Annually if at high risk -  No recommendations at this time    Chlamydia Annually if high risk -  No recommendations at this time   Screening Mammogram    Mammogram     Recommend annually for all female patients aged 40 and older    One baseline mammogram covered for patients aged 35-39 02/06/2024    Health Maintenance   Topic Date Due    Mammogram  02/06/2025       Immunizations    Influenza Covered once per flu season  Please get every year -  No recommendations at this time    Pneumococcal Each vaccine ([id number] & [id number]) covered once after 65 Prevnar 13: 12/01/2020    [id number]: 09/23/2021     No recommendations at this time    Hepatitis B One screening covered for patients with certain risk factors   -  No recommendations at this time    Tetanus Toxoid Not covered by Medicare Part B unless medically necessary (cut with metal); may be covered with your pharmacy prescription benefits -    Tetanus, Diptheria and Pertusis TD and TDaP Not covered by Medicare Part B -  No recommendations at this time    Zoster Not covered by Medicare Part B; may be covered with your pharmacy  prescription benefits -  No recommendations at this time     Annual Monitoring of Persistent Medications (ACE/ARB, digoxin diuretics, anticonvulsants)    Potassium Annually Lab Results   Component Value Date    K 4.0 12/06/2023         Creatinine   Annually Lab Results   Component Value Date    CREATSERUM 0.71 12/06/2023         BUN Annually Lab Results   Component Value Date    BUN 13 12/06/2023       Drug Serum Conc Annually No results found for: \"DIGOXIN\", \"DIG\", \"VALP\"

## 2024-06-25 ENCOUNTER — OFFICE VISIT (OUTPATIENT)
Dept: OTOLARYNGOLOGY | Facility: CLINIC | Age: 74
End: 2024-06-25

## 2024-06-25 VITALS — BODY MASS INDEX: 22.16 KG/M2 | WEIGHT: 133 LBS | HEIGHT: 65 IN

## 2024-06-25 DIAGNOSIS — L92.9 GRANULATION TISSUE: ICD-10-CM

## 2024-06-25 DIAGNOSIS — H61.23 CERUMEN DEBRIS ON TYMPANIC MEMBRANE OF BOTH EARS: Primary | ICD-10-CM

## 2024-06-25 DIAGNOSIS — H91.13 PRESBYCUSIS OF BOTH EARS: ICD-10-CM

## 2024-06-25 PROCEDURE — 1159F MED LIST DOCD IN RCRD: CPT | Performed by: SPECIALIST

## 2024-06-25 PROCEDURE — 3008F BODY MASS INDEX DOCD: CPT | Performed by: SPECIALIST

## 2024-06-25 PROCEDURE — 69210 REMOVE IMPACTED EAR WAX UNI: CPT | Performed by: SPECIALIST

## 2024-06-25 PROCEDURE — 1160F RVW MEDS BY RX/DR IN RCRD: CPT | Performed by: SPECIALIST

## 2024-06-25 RX ORDER — OFLOXACIN 3 MG/ML
5 SOLUTION AURICULAR (OTIC) DAILY
Qty: 5 ML | Refills: 0 | Status: SHIPPED | OUTPATIENT
Start: 2024-06-25 | End: 2024-07-05

## 2024-06-25 NOTE — PROGRESS NOTES
Narcisa Teague is a 73 year old female.   Chief Complaint   Patient presents with    Ear Wax     Ear cleaning     HPI:   Here to get her ears cleaned and checked wears binaural hearing aids.    Current Outpatient Medications   Medication Sig Dispense Refill    ofloxacin 0.3 % Otic Solution Place 5 drops into the right ear daily for 10 days. 5 mL 0    atorvastatin 40 MG Oral Tab Take 1 tablet every day by oral route. 90 tablet 4    metoprolol succinate  MG Oral Tablet 24 Hr Take 1 tablet (100 mg total) by mouth daily.      amLODIPine 2.5 MG Oral Tab Take 1 tablet (2.5 mg total) by mouth daily.      losartan 50 MG Oral Tab Take 1 tablet (50 mg total) by mouth daily. (Patient taking differently: Take 2 tablets (100 mg total) by mouth daily.) 90 tablet 4    aspirin 81 MG Oral Chew Tab aspirin 81 mg chewable tablet   Chew 1 tablet every day by oral route.      bumetanide 1 MG Oral Tab Take 1 tablet (1 mg total) by mouth daily.        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, right    R thenar mass    Essential hypertension    High blood pressure    PONV (postoperative nausea and vomiting)      Social History:  Social History     Socioeconomic History    Marital status:    Tobacco Use    Smoking status: Never    Smokeless tobacco: Never   Vaping Use    Vaping status: Never Used   Substance and Sexual Activity    Alcohol use: No    Drug use: Never        REVIEW OF SYSTEMS:   GENERAL HEALTH: feels well otherwise  GENERAL : denies fever, chills, sweats, weight loss, weight gain  SKIN: denies any unusual skin lesions or rashes  RESPIRATORY: denies shortness of breath with exertion  NEURO: denies headaches    EXAM:   Ht 5' 5\" (1.651 m)   Wt 133 lb (60.3 kg)   BMI 22.13 kg/m²   System Details   Skin Inspection - Normal.   Constitutional Overall appearance - Normal.   Head/Face Facial features - Normal. Eyebrows - Normal. Skull - Normal.   Eyes Conjunctiva - Right: Normal, Left: Normal.  Pupil - Right: Normal, Left: Normal.    Ears Inspection - Right: Normal, Left: Normal.   Ears = bilateral cerumen occlussions.    Fully cleaned under microscope using instrumentation and suctioning.    Normal tympanic membranes.   Granulation tissue deep close to the tympanic membrane inferiorly on the right.   Nasal External nose - Normal.   Nasal septum - Normal.  Turbinates - Normal.   Oral/Oropharynx Lips - Normal, Tonsils - Normal, Tongue - Normal    Neck Exam Inspection - Normal. Palpation - Normal. Parotid gland - Normal. Thyroid gland - Normal.   Lymph Detail Submental. Submandibular. Anterior cervical. Posterior cervical. Supraclavicular all without enlargement   Psychiatric Orientation - Oriented to time, place, person & situation. Appropriate mood and affect.   Neurological Memory - Normal. Cranial nerves - Cranial nerves II through XII grossly intact.     ASSESSMENT AND PLAN:   1. Cerumen debris on tympanic membrane of both ears  Fully cleaned.  Follow up in 6 months time, sooner if problems.    2. Granulation tissue  Placed on floxin drops    3. Presbycusis of both ears  Continue binaural hearing aids      The patient indicates understanding of these issues and agrees to the plan.      Jesenia Watkins MD  6/25/2024  12:35 PM

## 2024-06-25 NOTE — PATIENT INSTRUCTIONS
Cerumen was fully cleaned from both your ears.  I placed you on Floxin drops at bedtime for the granulation tissue in your right ear.  Follow-up in 6 months time, sooner if problems.

## 2024-12-31 ENCOUNTER — OFFICE VISIT (OUTPATIENT)
Dept: OTOLARYNGOLOGY | Facility: CLINIC | Age: 74
End: 2024-12-31

## 2024-12-31 VITALS — BODY MASS INDEX: 22.16 KG/M2 | WEIGHT: 133 LBS | HEIGHT: 65 IN

## 2024-12-31 DIAGNOSIS — L92.9 GRANULATION TISSUE: ICD-10-CM

## 2024-12-31 DIAGNOSIS — H91.13 PRESBYCUSIS OF BOTH EARS: ICD-10-CM

## 2024-12-31 DIAGNOSIS — H61.23 CERUMEN DEBRIS ON TYMPANIC MEMBRANE OF BOTH EARS: Primary | ICD-10-CM

## 2024-12-31 PROCEDURE — 3008F BODY MASS INDEX DOCD: CPT | Performed by: SPECIALIST

## 2024-12-31 PROCEDURE — 1159F MED LIST DOCD IN RCRD: CPT | Performed by: SPECIALIST

## 2024-12-31 PROCEDURE — 69210 REMOVE IMPACTED EAR WAX UNI: CPT | Performed by: SPECIALIST

## 2024-12-31 PROCEDURE — 1160F RVW MEDS BY RX/DR IN RCRD: CPT | Performed by: SPECIALIST

## 2024-12-31 PROCEDURE — 99213 OFFICE O/P EST LOW 20 MIN: CPT | Performed by: SPECIALIST

## 2025-01-01 NOTE — PROGRESS NOTES
Narcisa Teague is a 74 year old female.   Chief Complaint   Patient presents with    Ear Wax     Ear cleaning     HPI:   Patient here had been placed on Floxin drops for granulation tissue in her right ear.  She wears binaural hearing aids.    Current Outpatient Medications   Medication Sig Dispense Refill    atorvastatin 40 MG Oral Tab Take 1 tablet every day by oral route. 90 tablet 4    metoprolol succinate  MG Oral Tablet 24 Hr Take 1 tablet (100 mg total) by mouth daily.      amLODIPine 2.5 MG Oral Tab Take 1 tablet (2.5 mg total) by mouth daily.      losartan 50 MG Oral Tab Take 1 tablet (50 mg total) by mouth daily. (Patient taking differently: Take 2 tablets (100 mg total) by mouth daily.) 90 tablet 4    aspirin 81 MG Oral Chew Tab aspirin 81 mg chewable tablet   Chew 1 tablet every day by oral route.      bumetanide 1 MG Oral Tab Take 1 tablet (1 mg total) by mouth daily.        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, right    R thenar mass    Essential hypertension    High blood pressure    PONV (postoperative nausea and vomiting)      Social History:  Social History     Socioeconomic History    Marital status:    Tobacco Use    Smoking status: Never    Smokeless tobacco: Never   Vaping Use    Vaping status: Never Used   Substance and Sexual Activity    Alcohol use: No    Drug use: Never        REVIEW OF SYSTEMS:   GENERAL HEALTH: feels well otherwise  GENERAL : denies fever, chills, sweats, weight loss, weight gain  SKIN: denies any unusual skin lesions or rashes  RESPIRATORY: denies shortness of breath with exertion  NEURO: denies headaches    EXAM:   Ht 5' 5\" (1.651 m)   Wt 133 lb (60.3 kg)   BMI 22.13 kg/m²   System Details   Skin Inspection - Normal.   Constitutional Overall appearance - Normal.   Head/Face Facial features - Normal. Eyebrows - Normal. Skull - Normal.   Eyes Conjunctiva - Right: Normal, Left: Normal. Pupil - Right: Normal, Left: Normal.     Ears Inspection - Right: Normal, Left: Normal.   Ears = bilateral cerumen occlussions.    Fully cleaned under microscope using instrumentation and suctioning.    Normal tympanic membranes.  No granulation tissue in the right external auditory canal.   Nasal External nose - Normal.   Nasal septum - Normal.  Turbinates - Normal.   Oral/Oropharynx Lips - Normal, Tonsils - Normal, Tongue - Normal    Neck Exam Inspection - Normal. Palpation - Normal. Parotid gland - Normal. Thyroid gland - Normal.   Lymph Detail Submental. Submandibular. Anterior cervical. Posterior cervical. Supraclavicular all without enlargement   Psychiatric Orientation - Oriented to time, place, person & situation. Appropriate mood and affect.   Neurological Memory - Normal. Cranial nerves - Cranial nerves II through XII grossly intact.     ASSESSMENT AND PLAN:   1. Cerumen debris on tympanic membrane of both ears  Fully cleaned.  Follow-up in 6 months time, sooner if problems.    2. Granulation tissue  Resolved with Floxin drops.    3. Presbycusis of both ears  Continue binaural hearing aids.      The patient indicates understanding of these issues and agrees to the plan.      Jesenia Watkins MD  12/31/2024  6:51 PM

## 2025-01-01 NOTE — PATIENT INSTRUCTIONS
Granulation tissue in your right ear has resolved after the Floxin drops.  Cerumen was fully cleaned from both your ears.  Follow-up in 6 months time, sooner if problems.  Continue binaural hearing aids.

## 2025-01-13 ENCOUNTER — TELEPHONE (OUTPATIENT)
Dept: FAMILY MEDICINE CLINIC | Facility: CLINIC | Age: 75
End: 2025-01-13

## 2025-01-13 DIAGNOSIS — Z97.4 USES HEARING AID: Primary | ICD-10-CM

## 2025-01-13 NOTE — TELEPHONE ENCOUNTER
Patient is requesting referral.     Name of specialist and specialty department: Audiology    Reason for visit with the specialist: Consult, left hearing aid is broken    Address of the specialist office: [facility] [address] [address]    Appointment date: N/A     CSS informed patient the turnaround time for referral is 5-7 business days.  Patient was informed to check [facility] for referral status.

## 2025-01-17 NOTE — TELEPHONE ENCOUNTER
Pended referral. Please review diagnosis and sign off if you agree.    Thank you,  Tea   [facility] [phone]

## 2025-01-28 ENCOUNTER — OFFICE VISIT (OUTPATIENT)
Dept: AUDIOLOGY | Facility: CLINIC | Age: 75
End: 2025-01-28

## 2025-01-28 DIAGNOSIS — H90.3 SENSORINEURAL HEARING LOSS (SNHL) OF BOTH EARS: Primary | ICD-10-CM

## 2025-01-28 DIAGNOSIS — H93.13 TINNITUS OF BOTH EARS: ICD-10-CM

## 2025-01-28 PROCEDURE — 92567 TYMPANOMETRY: CPT | Performed by: AUDIOLOGIST

## 2025-01-28 PROCEDURE — 92557 COMPREHENSIVE HEARING TEST: CPT | Performed by: AUDIOLOGIST

## 2025-01-28 PROCEDURE — 1159F MED LIST DOCD IN RCRD: CPT | Performed by: AUDIOLOGIST

## 2025-02-22 ENCOUNTER — HOSPITAL ENCOUNTER (INPATIENT)
Facility: HOSPITAL | Age: 75
LOS: 3 days | Discharge: HOME | End: 2025-02-25
Attending: EMERGENCY MEDICINE | Admitting: STUDENT IN AN ORGANIZED HEALTH CARE EDUCATION/TRAINING PROGRAM
Payer: MEDICARE

## 2025-02-22 ENCOUNTER — APPOINTMENT (OUTPATIENT)
Dept: GENERAL RADIOLOGY | Facility: HOSPITAL | Age: 75
End: 2025-02-22
Attending: EMERGENCY MEDICINE
Payer: MEDICARE

## 2025-02-22 DIAGNOSIS — I48.91 ATRIAL FIBRILLATION WITH RAPID VENTRICULAR RESPONSE (HCC): Primary | ICD-10-CM

## 2025-02-22 PROBLEM — R73.9 HYPERGLYCEMIA: Status: ACTIVE | Noted: 2025-02-22

## 2025-02-22 LAB
ALBUMIN SERPL-MCNC: 4.3 G/DL (ref 3.2–4.8)
ALBUMIN/GLOB SERPL: 1.4 {RATIO} (ref 1–2)
ALP LIVER SERPL-CCNC: 95 U/L
ALT SERPL-CCNC: 109 U/L
ANION GAP SERPL CALC-SCNC: 9 MMOL/L (ref 0–18)
AST SERPL-CCNC: 121 U/L (ref ?–34)
BASOPHILS # BLD AUTO: 0.01 X10(3) UL (ref 0–0.2)
BASOPHILS NFR BLD AUTO: 0.1 %
BILIRUB SERPL-MCNC: 0.4 MG/DL (ref 0.2–1.1)
BNP SERPL-MCNC: 607 PG/ML (ref ?–100)
BUN BLD-MCNC: 13 MG/DL (ref 9–23)
BUN/CREAT SERPL: 16.9 (ref 10–20)
CALCIUM BLD-MCNC: 9 MG/DL (ref 8.7–10.4)
CHLORIDE SERPL-SCNC: 103 MMOL/L (ref 98–112)
CO2 SERPL-SCNC: 25 MMOL/L (ref 21–32)
CREAT BLD-MCNC: 0.77 MG/DL
DEPRECATED RDW RBC AUTO: 43.4 FL (ref 35.1–46.3)
EGFRCR SERPLBLD CKD-EPI 2021: 80 ML/MIN/1.73M2 (ref 60–?)
EOSINOPHIL # BLD AUTO: 0 X10(3) UL (ref 0–0.7)
EOSINOPHIL NFR BLD AUTO: 0 %
ERYTHROCYTE [DISTWIDTH] IN BLOOD BY AUTOMATED COUNT: 15.6 % (ref 11–15)
GLOBULIN PLAS-MCNC: 3 G/DL (ref 2–3.5)
GLUCOSE BLD-MCNC: 130 MG/DL (ref 70–99)
HCT VFR BLD AUTO: 37.7 %
HGB BLD-MCNC: 12.1 G/DL
IMM GRANULOCYTES # BLD AUTO: 0.03 X10(3) UL (ref 0–1)
IMM GRANULOCYTES NFR BLD: 0.3 %
INR BLD: 0.99 (ref 0.8–1.2)
LYMPHOCYTES # BLD AUTO: 1.04 X10(3) UL (ref 1–4)
LYMPHOCYTES NFR BLD AUTO: 11.6 %
MAGNESIUM SERPL-MCNC: 2.2 MG/DL (ref 1.6–2.6)
MCH RBC QN AUTO: 24.8 PG (ref 26–34)
MCHC RBC AUTO-ENTMCNC: 32.1 G/DL (ref 31–37)
MCV RBC AUTO: 77.4 FL
MONOCYTES # BLD AUTO: 0.43 X10(3) UL (ref 0.1–1)
MONOCYTES NFR BLD AUTO: 4.8 %
NEUTROPHILS # BLD AUTO: 7.44 X10 (3) UL (ref 1.5–7.7)
NEUTROPHILS # BLD AUTO: 7.44 X10(3) UL (ref 1.5–7.7)
NEUTROPHILS NFR BLD AUTO: 83.2 %
OSMOLALITY SERPL CALC.SUM OF ELEC: 286 MOSM/KG (ref 275–295)
PLATELET # BLD AUTO: 205 10(3)UL (ref 150–450)
POTASSIUM SERPL-SCNC: 4.1 MMOL/L (ref 3.5–5.1)
PROT SERPL-MCNC: 7.3 G/DL (ref 5.7–8.2)
PROTHROMBIN TIME: 13.7 SECONDS (ref 11.6–14.8)
RBC # BLD AUTO: 4.87 X10(6)UL
SODIUM SERPL-SCNC: 137 MMOL/L (ref 136–145)
T3FREE SERPL-MCNC: 3.11 PG/ML (ref 2.4–4.2)
T4 FREE SERPL-MCNC: 1.3 NG/DL (ref 0.8–1.7)
TROPONIN I SERPL HS-MCNC: 20 NG/L
TSI SER-ACNC: 0.28 UIU/ML (ref 0.55–4.78)
WBC # BLD AUTO: 9 X10(3) UL (ref 4–11)

## 2025-02-22 PROCEDURE — 71045 X-RAY EXAM CHEST 1 VIEW: CPT | Performed by: EMERGENCY MEDICINE

## 2025-02-22 PROCEDURE — 99223 1ST HOSP IP/OBS HIGH 75: CPT | Performed by: STUDENT IN AN ORGANIZED HEALTH CARE EDUCATION/TRAINING PROGRAM

## 2025-02-22 RX ORDER — HEPARIN SODIUM 5000 [USP'U]/ML
5000 INJECTION, SOLUTION INTRAVENOUS; SUBCUTANEOUS EVERY 12 HOURS SCHEDULED
Status: DISCONTINUED | OUTPATIENT
Start: 2025-02-23 | End: 2025-02-23

## 2025-02-22 RX ORDER — ONDANSETRON 2 MG/ML
4 INJECTION INTRAMUSCULAR; INTRAVENOUS EVERY 6 HOURS PRN
Status: DISCONTINUED | OUTPATIENT
Start: 2025-02-22 | End: 2025-02-25

## 2025-02-22 RX ORDER — BISACODYL 10 MG
10 SUPPOSITORY, RECTAL RECTAL
Status: DISCONTINUED | OUTPATIENT
Start: 2025-02-22 | End: 2025-02-25

## 2025-02-22 RX ORDER — SENNOSIDES 8.6 MG
17.2 TABLET ORAL NIGHTLY PRN
Status: DISCONTINUED | OUTPATIENT
Start: 2025-02-22 | End: 2025-02-25

## 2025-02-22 RX ORDER — POLYETHYLENE GLYCOL 3350 17 G/17G
17 POWDER, FOR SOLUTION ORAL DAILY PRN
Status: DISCONTINUED | OUTPATIENT
Start: 2025-02-22 | End: 2025-02-25

## 2025-02-22 RX ORDER — ACETAMINOPHEN 500 MG
500 TABLET ORAL EVERY 4 HOURS PRN
Status: DISCONTINUED | OUTPATIENT
Start: 2025-02-22 | End: 2025-02-25

## 2025-02-22 RX ORDER — ATORVASTATIN CALCIUM 40 MG/1
40 TABLET, FILM COATED ORAL NIGHTLY
Status: DISCONTINUED | OUTPATIENT
Start: 2025-02-23 | End: 2025-02-25

## 2025-02-22 RX ORDER — ASPIRIN 81 MG/1
81 TABLET, CHEWABLE ORAL DAILY
Status: DISCONTINUED | OUTPATIENT
Start: 2025-02-23 | End: 2025-02-23

## 2025-02-22 RX ORDER — SODIUM PHOSPHATE, DIBASIC AND SODIUM PHOSPHATE, MONOBASIC 7; 19 G/230ML; G/230ML
1 ENEMA RECTAL ONCE AS NEEDED
Status: DISCONTINUED | OUTPATIENT
Start: 2025-02-22 | End: 2025-02-25

## 2025-02-22 NOTE — ED INITIAL ASSESSMENT (HPI)
Pt presents to ED r/t near syncopal episode. Pt denies cp/sob/dizziness. Pt c/o palpitations. Pt dx with afib rvr at [facility]. Pt c/o feeling diaphoretic.

## 2025-02-23 ENCOUNTER — APPOINTMENT (OUTPATIENT)
Dept: CV DIAGNOSTICS | Facility: HOSPITAL | Age: 75
End: 2025-02-23
Attending: INTERNAL MEDICINE
Payer: MEDICARE

## 2025-02-23 LAB
ANION GAP SERPL CALC-SCNC: 8 MMOL/L (ref 0–18)
BASOPHILS # BLD AUTO: 0.03 X10(3) UL (ref 0–0.2)
BASOPHILS NFR BLD AUTO: 0.4 %
BUN BLD-MCNC: 17 MG/DL (ref 9–23)
BUN/CREAT SERPL: 23 (ref 10–20)
CALCIUM BLD-MCNC: 8.6 MG/DL (ref 8.7–10.4)
CHLORIDE SERPL-SCNC: 106 MMOL/L (ref 98–112)
CO2 SERPL-SCNC: 24 MMOL/L (ref 21–32)
CREAT BLD-MCNC: 0.74 MG/DL
DEPRECATED RDW RBC AUTO: 43.1 FL (ref 35.1–46.3)
EGFRCR SERPLBLD CKD-EPI 2021: 84 ML/MIN/1.73M2 (ref 60–?)
EOSINOPHIL # BLD AUTO: 0.04 X10(3) UL (ref 0–0.7)
EOSINOPHIL NFR BLD AUTO: 0.5 %
ERYTHROCYTE [DISTWIDTH] IN BLOOD BY AUTOMATED COUNT: 15.6 % (ref 11–15)
GLUCOSE BLD-MCNC: 110 MG/DL (ref 70–99)
HCT VFR BLD AUTO: 37.5 %
HGB BLD-MCNC: 11.8 G/DL
IMM GRANULOCYTES # BLD AUTO: 0.02 X10(3) UL (ref 0–1)
IMM GRANULOCYTES NFR BLD: 0.3 %
LYMPHOCYTES # BLD AUTO: 1.9 X10(3) UL (ref 1–4)
LYMPHOCYTES NFR BLD AUTO: 25.7 %
MCH RBC QN AUTO: 24.2 PG (ref 26–34)
MCHC RBC AUTO-ENTMCNC: 31.5 G/DL (ref 31–37)
MCV RBC AUTO: 76.8 FL
MONOCYTES # BLD AUTO: 0.7 X10(3) UL (ref 0.1–1)
MONOCYTES NFR BLD AUTO: 9.5 %
NEUTROPHILS # BLD AUTO: 4.69 X10 (3) UL (ref 1.5–7.7)
NEUTROPHILS # BLD AUTO: 4.69 X10(3) UL (ref 1.5–7.7)
NEUTROPHILS NFR BLD AUTO: 63.6 %
OSMOLALITY SERPL CALC.SUM OF ELEC: 288 MOSM/KG (ref 275–295)
PLATELET # BLD AUTO: 207 10(3)UL (ref 150–450)
POTASSIUM SERPL-SCNC: 3.9 MMOL/L (ref 3.5–5.1)
Q-T INTERVAL: 284 MS
QRS DURATION: 70 MS
QTC CALCULATION (BEZET): 408 MS
R AXIS: 7 DEGREES
RBC # BLD AUTO: 4.88 X10(6)UL
SODIUM SERPL-SCNC: 138 MMOL/L (ref 136–145)
T AXIS: 49 DEGREES
VENTRICULAR RATE: 124 BPM
WBC # BLD AUTO: 7.4 X10(3) UL (ref 4–11)

## 2025-02-23 PROCEDURE — 93306 TTE W/DOPPLER COMPLETE: CPT | Performed by: INTERNAL MEDICINE

## 2025-02-23 PROCEDURE — 99233 SBSQ HOSP IP/OBS HIGH 50: CPT | Performed by: INTERNAL MEDICINE

## 2025-02-23 RX ORDER — DILTIAZEM HYDROCHLORIDE 120 MG/1
120 CAPSULE, EXTENDED RELEASE ORAL DAILY
Status: DISCONTINUED | OUTPATIENT
Start: 2025-02-23 | End: 2025-02-25

## 2025-02-23 RX ORDER — METOPROLOL SUCCINATE 100 MG/1
100 TABLET, EXTENDED RELEASE ORAL
Status: DISCONTINUED | OUTPATIENT
Start: 2025-02-23 | End: 2025-02-25

## 2025-02-23 RX ORDER — METOPROLOL TARTRATE 100 MG/1
100 TABLET ORAL
Status: DISCONTINUED | OUTPATIENT
Start: 2025-02-23 | End: 2025-02-23

## 2025-02-23 RX ORDER — BUMETANIDE 1 MG/1
1 TABLET ORAL DAILY
Status: DISCONTINUED | OUTPATIENT
Start: 2025-02-23 | End: 2025-02-25

## 2025-02-23 NOTE — PLAN OF CARE
Pt from home with son. No complains of dizziness or palpitations overnight. HR down to 60-70, BP soft at midnight. Cardizem gtt paused then restarted about 3-4am for HR 120s. BP stable. Call light within reach, son at bedside.    Problem: Patient Centered Care  Goal: Patient preferences are identified and integrated in the patient's plan of care  Description: Interventions:  - What would you like us to know as we care for you? From home with son.  - Provide timely, complete, and accurate information to patient/family  - Incorporate patient and family knowledge, values, beliefs, and cultural backgrounds into the planning and delivery of care  - Encourage patient/family to participate in care and decision-making at the level they choose  - Honor patient and family perspectives and choices  Outcome: Progressing     Problem: CARDIOVASCULAR - ADULT  Goal: Maintains optimal cardiac output and hemodynamic stability  Description: INTERVENTIONS:  - Monitor vital signs, rhythm, and trends  - Monitor for bleeding, hypotension and signs of decreased cardiac output  - Evaluate effectiveness of vasoactive medications to optimize hemodynamic stability  - Monitor arterial and/or venous puncture sites for bleeding and/or hematoma  - Assess quality of pulses, skin color and temperature  - Assess for signs of decreased coronary artery perfusion - ex. Angina  - Evaluate fluid balance, assess for edema, trend weights  Outcome: Progressing  Goal: Absence of cardiac arrhythmias or at baseline  Description: INTERVENTIONS:  - Continuous cardiac monitoring, monitor vital signs, obtain 12 lead EKG if indicated  - Evaluate effectiveness of antiarrhythmic and heart rate control medications as ordered  - Initiate emergency measures for life threatening arrhythmias  - Monitor electrolytes and administer replacement therapy as ordered  Outcome: Progressing     Problem: SAFETY ADULT - FALL  Goal: Free from fall injury  Description:  INTERVENTIONS:  - Assess pt frequently for physical needs  - Identify cognitive and physical deficits and behaviors that affect risk of falls.  - Corwith fall precautions as indicated by assessment.  - Educate pt/family on patient safety including physical limitations  - Instruct pt to call for assistance with activity based on assessment  - Modify environment to reduce risk of injury  - Provide assistive devices as appropriate  - Consider OT/PT consult to assist with strengthening/mobility  - Encourage toileting schedule  Outcome: Progressing     Problem: DISCHARGE PLANNING  Goal: Discharge to home or other facility with appropriate resources  Description: INTERVENTIONS:  - Identify barriers to discharge w/pt and caregiver  - Include patient/family/discharge partner in discharge planning  - Arrange for needed discharge resources and transportation as appropriate  - Identify discharge learning needs (meds, wound care, etc)  - Arrange for interpreters to assist at discharge as needed  - Consider post-discharge preferences of patient/family/discharge partner  - Complete POLST form as appropriate  - Assess patient's ability to be responsible for managing their own health  - Refer to Case Management Department for coordinating discharge planning if the patient needs post-hospital services based on physician/LIP order or complex needs related to functional status, cognitive ability or social support system  Outcome: Progressing

## 2025-02-23 NOTE — PROGRESS NOTES
Progress Note     Narcisa Teague Patient Status:  Inpatient    1950 MRN [id number]   Location [facility] 3W/SW Attending Shahida Stringer MD   Hosp Day # 1 PCP Jesenia Smith MD     Chief Complaint: Atrial fibrillation with RVR, new onset.    Subjective:   S: Patient resting comfortably in bed denies any acute complaints.  No palpitations no dizziness today no chest pain or shortness of breath.    Daughter at bedside plan of care discussed    No other acute complaints or other nursing concerns or overnight events    Telemetry showing A-fib 110s    Review of Systems:   10 point ROS completed and was negative, except for pertinent positive and negatives stated in subjective.    Objective:   Vital signs:  Temp:  [96.7 °F (35.9 °C)-98.4 °F (36.9 °C)] 98.4 °F (36.9 °C)  Pulse:  [] 117  Resp:  [11-22] 18  BP: ()/(55-94) 102/74  SpO2:  [90 %-98 %] 97 %    Wt Readings from Last 6 Encounters:   25 128 lb 12.8 oz (58.4 kg)   24 133 lb (60.3 kg)   24 133 lb (60.3 kg)   24 133 lb 12.8 oz (60.7 kg)   23 138 lb (62.6 kg)   23 138 lb 9.6 oz (62.9 kg)         Physical Exam:    General: No acute distress. Alert ,         Respiratory: Clear to auscultation bilaterally. No wheezes. No rhonchi.  Cardiovascular: S1, S2.  IR IR. No murmurs, rubs or gallops.   Abdomen: Soft, nontender, nondistended.  Positive bowel sounds. No rebound or guarding.  Neurologic: No focal neurological deficits.   Musculoskeletal: Moves all extremities.  Extremities: No edema.    Results:   Diagnostic Data:      Labs:    Labs Last 24 Hours:   BMP     CBC    Other     Na 137 Cl 103 BUN 13 Glu 130   Hb 12.1   PTT - Procal -   K 4.1 CO2 25.0 Cr 0.77   WBC 9.0 >< .0  INR 0.99 CRP -   Renal Lytes Endo    Hct 37.7   Trop - D dim -   eGFR - Ca 9.0 POC Gluc  -    LFT   pBNP - Lactic -   eGFR AA - PO4 - A1c -    APk 95 Prot 7.3  LDL -     Mg 2.2 TSH 0.285    T carrie 0.4 Alb 4.3         COVID-19 Lab Results    COVID-19  Lab Results   Component Value Date    COVID19 Not Detected 08/14/2021    COVID19 Not Detected 02/17/2021       Pro-Calcitonin  No results for input(s): \"PCT\" in the last 168 hours.    Cardiac  No results for input(s): \"TROP\", \"PBNP\" in the last 168 hours.    Creatinine Kinase  No results for input(s): \"CK\" in the last 168 hours.    Inflammatory Markers  No results for input(s): \"CRP\", \"CATHERINE\", \"LDH\", \"DDIMER\" in the last 168 hours.    Imaging: Imaging data reviewed in Epic.    Medications:    metoprolol succinate  100 mg Oral Daily Beta Blocker    apixaban  5 mg Oral BID    bumetanide  1 mg Oral Daily    atorvastatin  40 mg Oral Nightly       Assessment & Plan:   ASSESSMENT / PLAN:     #Atrial fibrillation with RVR, new onset.   Hemodynamically stable. [id number] 4 (Age/Sex) not on any anticoagulation  -Patient received Cardizem bolus and started on Cardizem drip in the ED with improvement of heart rate  -Cardiology consult   -Monitor on telemetry  -Monitor electrolytes  -Anticoagulation as above  -Rate/rhythm control with Cardizem  -2/23: Transitioned off of IV diltiazem today onto oral metoprolol.  Awaiting echocardiogram results.  A-fib on telemetry still in low 100s.  Patient asymptomatic on DOAC.  Aspirin discontinued; possibly home tomorrow pending echo results and vital signs     Concern for acute CHF  -Chest x-ray with pulmonary vascular congestion.  -Patient is on room air, does not require any oxygen supplementation.  Does not appear fluid overloaded on physical exam.  -Noted to have a low BP after initiation of Cardizem. Will hold off on diuretics  -Cardiology on consult     Other medical conditions  Mitral valve stenosis  Carotid stenosis  Essential hypertension     Prophylaxis  Subcutaneous heparin     CODE STATUS  Full              Coordinated care with providers and counseling re: treatment plan and workup     Shahida Stringer MD    Supplementary Documentation:          **Certification      PHYSICIAN Certification of Need for Inpatient Hospitalization - Initial Certification    Patient will require inpatient services that will reasonably be expected to span two midnight's based on the clinical documentation in H+P.   Based on patients current state of illness, I anticipate that, after discharge, patient will require TBD.

## 2025-02-23 NOTE — H&P
[facility]  part of [facility]    History & Physical    Narcisa Teague Patient Status:  Emergency    1950 MRN [id number]   Location [facility] EMERGENCY DEPARTMENT Attending Rip Worrell MD   Hosp Day # 0 PCP Jesenia Smith MD     Date:  2025  Date of Admission:  2025    Chief Complaint:  Chief Complaint   Patient presents with    Syncope    Arrythmia/Palpitations     Assessment and Plan:    Atrial fibrillation with RVR, new onset.   Hemodynamically stable. [id number] 4 (Age/Sex) not on any anticoagulation  -Patient received Cardizem bolus and started on Cardizem drip in the ED with improvement of heart rate  -Cardiology consult   -Monitor on telemetry  -Monitor electrolytes  -Anticoagulation as above  -Rate/rhythm control with Cardizem  -Echo per cardiology    Concern for acute CHF  -Chest x-ray with pulmonary vascular congestion.  -Patient is on room air, does not require any oxygen supplementation.  Does not appear fluid overloaded on physical exam.  -Noted to have a low BP after initiation of Cardizem. Will hold off on diuretics  -Cardiology on consult    Other medical conditions  Mitral valve stenosis  Carotid stenosis  Essential hypertension    Prophylaxis  Subcutaneous heparin    CODE STATUS  Full    History of Present Illness:  Narcisa Teague is a(n) 74 year old female, who presents for evaluation of near syncopal episode/palpitations. PMHx significant for mitral valve stenosis, carotid stenosis, essential hypertension.  Patient accompanied by family members at bedside.  Patient speaks Icelandic, history obtained by family members at bedside.  Patient has been feeling weak for the last couple of days and today she almost fell to the ground, did not hit her head did not lose consciousness.  She has been feeling palpitations.  A couple of weeks ago she felt the same but her symptoms were brief.  No recent weight gain or weight loss.  No PND or orthopnea.   Denies any dyspnea on exertion.  Denies any chest pain.  Denies any fever or chills.  Denies any extremity swelling.  On presentation to the ED, patient was found to be in atrial fibrillation.  She was given a dose of Cardizem with improvement of her heart rate but then her blood pressure dropped to SBP 96/64, patient remained asymptomatic.  Patient denies any melena, hematochezia, hematemesis.  Lab work within normal limits.  Chest x-ray reveals cardiomegaly with mild pulmonary venous congestion.  Patient admitted under hospitalist service with consultation to cardiology.    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 She has never used smokeless tobacco. She reports that she does not drink alcohol and does not use drugs.    Allergies:  Allergies[1]    Home Medications:  Prior to Admission Medications   Prescriptions Last Dose Informant Patient Reported? Taking?   amLODIPine 2.5 MG Oral Tab   Yes No   Sig: Take 1 tablet (2.5 mg total) by mouth daily.   aspirin 81 MG Oral Chew Tab   Yes No   Sig: aspirin 81 mg chewable tablet   Chew 1 tablet every day by oral route.   atorvastatin 40 MG Oral Tab   No No   Sig: Take 1 tablet every day by oral route.   bumetanide 1 MG Oral Tab   Yes No   Sig: Take 1 tablet (1 mg total) by mouth daily.   losartan 50 MG Oral Tab   No No   Sig: Take 1 tablet (50 mg total) by mouth daily.   Patient taking differently: Take 2 tablets (100 mg total) by mouth daily.   metoprolol succinate  MG Oral Tablet 24 Hr   Yes No   Sig: Take 1 tablet  (100 mg total) by mouth daily.      Facility-Administered Medications: None       Review of Systems:  Constitutional: Lightheadedness/dizziness  Eye:  Negative.  Ear/Nose/Mouth/Throat:  Negative.  Respiratory:  Negative  Cardiovascular: Negative  Gastrointestinal:  Negative.  Genitourinary:  Negative  Endocrine:  Negative.  Immunologic:  Negative.  Musculoskeletal:  Negative.  Integumentary:  Negative.  Neurologic:  Negative.  Psychiatric:  Negative.  ROS reviewed as documented in chart    Physical Exam:  Temp:  [96.7 °F (35.9 °C)] 96.7 °F (35.9 °C)  Pulse:  [] 56  Resp:  [11-22] 14  BP: ()/(55-94) 105/57  SpO2:  [91 %-98 %] 98 %    General:  Alert and oriented.  Diffuse skin problem:  None.  Eye:  Pupils are equal, round and reactive to light, extraocular movements are intact, Normal conjunctiva.  HENT:  Normocephalic, oral mucosa is moist.  Head:  Normocephalic, atraumatic.  Neck:  Supple, non-tender, no carotid bruit, no jugular venous distention, no lymphadenopathy, no thyromegaly.  Respiratory:  Lungs are clear to auscultation, respirations are non-labored, breath sounds are equal, symmetrical chest wall expansion.  Cardiovascular: Irregularly irregular, no murmur, no edema.  Gastrointestinal:  Soft, non-tender, non-distended, normal bowel sounds, no organomegaly.  Lymphatics:  No lymphadenopathy neck, axilla, groin.  Musculoskeletal: Normal range of motion.  normal strength.  Feet:  Normal pulses.  Neurologic:  Alert, oriented, no focal deficits, cranial nerves II-XII are grossly intact.  Cognition and Speech:  Oriented, speech clear and coherent.  Psychiatric:  Cooperative, appropriate mood & affect.      Laboratory Data:   Lab Results   Component Value Date    WBC 9.0 02/22/2025    HGB 12.1 02/22/2025    HCT 37.7 02/22/2025    .0 02/22/2025    CREATSERUM 0.77 02/22/2025    BUN 13 02/22/2025     02/22/2025    K 4.1 02/22/2025     02/22/2025    CO2 25.0 02/22/2025      02/22/2025    CA 9.0 02/22/2025    ALB 4.3 02/22/2025    ALKPHO 95 02/22/2025    BILT 0.4 02/22/2025    TP 7.3 02/22/2025     02/22/2025     02/22/2025    INR 0.99 02/22/2025    T4F 1.3 02/22/2025    TSH 0.285 02/22/2025    MG 2.2 02/22/2025       Imaging:  XR CHEST AP PORTABLE  (CPT=71045)    Result Date: 2/22/2025  CONCLUSION:  1. Cardiomegaly with mild pulmonary venous congestion    Dictated by (CST): Aquilino Kaur MD on 2/22/2025 at 7:12 PM     Finalized by (CST): Aquilino Kaur MD on 2/22/2025 at 7:13 PM            Primary care physician  Jesenia Smith MD    60 minutes spent on this admission - examining patient, obtaining history, reviewing previous medical records, going over test results/imaging and discussing plan of care. All questions answered.     Disposition  Clinical course will dictate outcome      Terrie Reyes MD  2/22/2025  9:24 PM            [1] No Known Allergies

## 2025-02-23 NOTE — CONSULTS
HPI  74-year-old female seen in cardiology consultation due to atrial fibrillation with rapid ventricular response. She had been feeling weak and fell to the ground on the date of admission. She has been feeling palpitations. She was found to be in rapid atrial fibrillation and was started on a cardizem drip. Blood pressure dropped into the nineties. There has been no loss of consciousness. She denied chest pain or shortness of breath. She denied fevers, chills, or abdominal pain. She denied any black or bloody stools. She denied any extremity swelling.    Past medical history: Mitral valve stenosis, coronary artery disease, hypertension  Allergies: Not provided  Social history: Not provided  Cardiac medications: Amlodipine 2.5 mg daily, aspirin 81 mg, bumetanide 1 mg daily, losartan 50 mg daily, metoprolol succinate 100 mg daily  ROS  She has been feeling dizzy and nauseous, especially when standing up. She has a history of feeling dizzy and lightheaded, particularly in elevated areas. No cough or edema.  Otherwise a 12 point review of system is negative.  Exam  Vitals: BP: 102/74 HR: 117  Gen: NAD HEENT: Oral mucosa moist. Lungs: CTA Heart: Normal rate. No murmur. Abdomen: Soft and non tender Extremities: No edema and warm to touch. Neuro: Alert and oriented. Psych: Appropriate mood. Calm affect. Skin: Normal perfusion. No rashes. Neck: Jvp not seen. Trachea midline.    Pertinent diagnostic findings: , chest x-ray demonstrating cardiomegaly with mild pulmonary congestion, high sensitivity troponin not elevated, EKG demonstrating atrial fibrillation with ventricular response at 124 bpm, creatinine 0.77, hemoglobin 12, platelet count 205. Echocardiogram from 10/2024 demonstrating mild aortic and mitral regurgitation and grade 2 diastolic dysfunction. Previous echocardiogram from 2019 demonstrating moderate mitral stenosis based on gradient and moderate mitral regurgitation, but subsequent echocardiogram did  not demonstrate mitral stenosis.    Assessment / Plan  1. New onset atrial fibrillation with rapid ventricular response -  Started on cardizem drip. Will resume metoprolol succinate 100 mg daily. Previously had short episodes of atrial tachycardia but no atrial fibrillation. Elevated CHADS2-VASc score, will start on Eliquis 5 mg twice daily and stop aspirin.    I48.91 - Unspecified atrial fibrillation    2. Coronary artery disease -  Continue statin therapy.  I25.10 - Atherosclerotic heart disease of native coronary artery without angina pectoris    3. Hypertension -  Blood pressure has been on the low side. She describes some orthostatic symptoms. Will remove some of her blood pressure medications. If necessary for rate control, may have to add oral diltiazem.    I10 - Essential (primary) hypertension    4. Acute on chronic diastolic heart failure -  Resume long-term bumetanide daily.    I50.32 - Chronic diastolic (congestive) heart failure    5. History of mitral regurgitation and mitral stenosis -  Appears to be volume related with gradients dynamically decreasing as volume status improved and mitral regurgitation improved. This would not be a contraindication for using DOAC for anticoagulation.  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 Near syncope -  She has previously had dizziness. Suspect she may have orthostatic hypotension. Will liberalize her blood pressure targets. Hope to obtain orthostatic blood pressures while she is here.    R55 - Syncope and collapse    Will stop diltiazem drip, continue metoprolol and bumetanide. Start Eliquis. Will obtain echocardiogram to assess left ventricular systolic function. Plan to keep her overnight, get her off the IV drip, start oral medications, watch her heart rate, and if doing better tomorrow, could send her home. If not, will keep her another day. Discussed with patient and family. Provided contact information for  outpatient follow-up. She will need a heart monitor and close follow-up with cardiology as an outpatient.

## 2025-02-23 NOTE — ED QUICK NOTES
Orders for admission, patient is aware of plan and ready to go upstairs. Any questions, please call ED RN Yolis at extension [phone].     Patient Covid vaccination status: Fully vaccinated     COVID Test Ordered in ED: None    COVID Suspicion at Admission: N/A    Running Infusions:    dilTIAZem 15 mg/hr (02/22/25 2052)        Mental Status/LOC at time of transport: AOX4    Other pertinent information:   CIWA score: N/A   NIH score:  N/A

## 2025-02-23 NOTE — ED QUICK NOTES
Transport at bedside. Patient aware of plan of care, verbalizes understanding. Brought to floor with belongings.

## 2025-02-23 NOTE — ED PROVIDER NOTES
[facility]  Emergency Department Attending Note     Chief Complaint:   Chief Complaint   Patient presents with    Syncope    Arrythmia/Palpitations     HISTORY OF PRESENT ILLNESS:   Narcisa Teague is a 74 year old female who presents to the ED with a past medical history including hypertension presents for evaluation after a near syncopal event earlier today.  Has been feeling generally weak for several days, so weak today that she almost fell to the ground.  Denies chest pain or dyspnea.  Feels that her heart is racing.  Denies fever chills.     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, right    R thenar mass    Essential hypertension    High blood pressure    PONV (postoperative nausea and vomiting)      Past Surgical History:   Procedure Laterality Date          Colonoscopy      Buddy    Excis tumor/avm,deep,hand/fingr Right     Excision R thenar mass    Knee replacement surgery Right 2019      Social History     Socioeconomic History    Marital status:    Tobacco Use    Smoking status: Never    Smokeless tobacco: Never   Vaping Use    Vaping status: Never Used   Substance and Sexual Activity    Alcohol use: No    Drug use: Never    Allergies[1]   Current Outpatient Medications   Medication Sig Dispense Refill    atorvastatin 40 MG Oral Tab Take 1 tablet every day by oral route. 90 tablet 4    metoprolol succinate  MG Oral Tablet 24 Hr Take 1 tablet (100 mg total) by mouth daily.      amLODIPine 2.5 MG Oral Tab Take 1 tablet (2.5 mg total) by mouth daily.      losartan 50 MG Oral Tab Take 1 tablet (50 mg total) by mouth daily. (Patient taking differently: Take 2 tablets (100 mg total) by mouth daily.) 90 tablet 4    aspirin 81 MG Oral Chew Tab aspirin 81 mg chewable tablet   Chew 1 tablet every day by oral route.      bumetanide 1 MG Oral Tab Take 1 tablet (1 mg total) by mouth daily.            REVIEW OF SYSTEMS   A  10 point review of systems was completed and is negative except as listed in history of present illness      PHYSICAL EXAM   Vitals: /77   Pulse 94   Temp 96.7 °F (35.9 °C) (Temporal)   Resp 16   Ht 162.6 cm (5' 4\")   Wt 64.4 kg   SpO2 91%   BMI 24.37 kg/m²   /77   Pulse 94   Temp 96.7 °F (35.9 °C) (Temporal)   Resp 16   Ht 162.6 cm (5' 4\")   Wt 64.4 kg   SpO2 91%   BMI 24.37 kg/m²     General: A&Ox3, NAD  Constitutional: Well developed, well nourished, nontoxic  Head: atraumatic, normocephalic   Eyes: conjuctiva clear, no icterus, PERRL, EOMI, vision grossly normal  Ears: normal external appearance, no drainage  Nose:  Atraumatic, no swelling, no drainage, nares patent  Throat:  Moist pink mucous membranes, airway is patent  Neck:  Soft supple, no masses, no tracheal deviation, no stridor  Chest:  No bruising or abrasions, no tenderness, no deformity  Cardiac: Tachycardic and irregular  Lung:  No distress, no retractions.  Faint basilar crackle  Abdomen:  Soft, nontender, nondistended, normal BS  Back: No stepoff/deformity  Extremities: FROM all ext, no deformities, intact equal peripheral pulses, no cyanosis or edema  Neuro: No facial droop, no slurred speech, moving all extremities freely, SILT to the bilateral upper and lower extremities  Psych: A&Ox3, normal affect, cooperative, calm  Skin: No rash, no petechiae/purpura, warm, dry      RESULTS  LABS:   Results for orders placed or performed during the hospital encounter of 02/22/25   CBC With Differential With Platelet    Collection Time: 02/22/25  5:52 PM   Result Value Ref Range    WBC 9.0 4.0 - 11.0 x10(3) uL    RBC 4.87 3.80 - 5.30 x10(6)uL    HGB 12.1 12.0 - 16.0 g/dL    HCT 37.7 35.0 - 48.0 %    MCV 77.4 (L) 80.0 - 100.0 fL    MCH 24.8 (L) 26.0 - 34.0 pg    MCHC 32.1 31.0 - 37.0 g/dL    RDW-SD 43.4 35.1 - 46.3 fL    RDW 15.6 (H) 11.0 - 15.0 %    .0 150.0 - 450.0 10(3)uL    Neutrophil Absolute Prelim 7.44 1.50 - 7.70 x10  (3) uL    Neutrophil Absolute 7.44 1.50 - 7.70 x10(3) uL    Lymphocyte Absolute 1.04 1.00 - 4.00 x10(3) uL    Monocyte Absolute 0.43 0.10 - 1.00 x10(3) uL    Eosinophil Absolute 0.00 0.00 - 0.70 x10(3) uL    Basophil Absolute 0.01 0.00 - 0.20 x10(3) uL    Immature Granulocyte Absolute 0.03 0.00 - 1.00 x10(3) uL    Neutrophil % 83.2 %    Lymphocyte % 11.6 %    Monocyte % 4.8 %    Eosinophil % 0.0 %    Basophil % 0.1 %    Immature Granulocyte % 0.3 %   Comp Metabolic Panel (14)    Collection Time: 02/22/25  5:52 PM   Result Value Ref Range    Glucose 130 (H) 70 - 99 mg/dL    Sodium 137 136 - 145 mmol/L    Potassium 4.1 3.5 - 5.1 mmol/L    Chloride 103 98 - 112 mmol/L    CO2 25.0 21.0 - 32.0 mmol/L    Anion Gap 9 0 - 18 mmol/L    BUN 13 9 - 23 mg/dL    Creatinine 0.77 0.55 - 1.02 mg/dL    BUN/CREA Ratio 16.9 10.0 - 20.0    Calcium, Total 9.0 8.7 - 10.4 mg/dL    Calculated Osmolality 286 275 - 295 mOsm/kg    eGFR-Cr 80 >=60 mL/min/1.73m2     (H) 10 - 49 U/L     (H) <34 U/L    Alkaline Phosphatase 95 55 - 142 U/L    Bilirubin, Total 0.4 0.2 - 1.1 mg/dL    Total Protein 7.3 5.7 - 8.2 g/dL    Albumin 4.3 3.2 - 4.8 g/dL    Globulin  3.0 2.0 - 3.5 g/dL    A/G Ratio 1.4 1.0 - 2.0   Troponin I (High Sensitivity)    Collection Time: 02/22/25  5:52 PM   Result Value Ref Range    Troponin I (High Sensitivity) 20 <=34 ng/L   Magnesium    Collection Time: 02/22/25  5:52 PM   Result Value Ref Range    Magnesium 2.2 1.6 - 2.6 mg/dL   Prothrombin Time (PT)    Collection Time: 02/22/25  5:52 PM   Result Value Ref Range    PT 13.7 11.6 - 14.8 seconds    INR 0.99 0.80 - 1.20   TSH W Reflex To Free T4    Collection Time: 02/22/25  5:52 PM   Result Value Ref Range    TSH 0.285 (L) 0.550 - 4.780 uIU/mL   T4, FREE (S)    Collection Time: 02/22/25  5:52 PM   Result Value Ref Range    Free T4 1.3 0.8 - 1.7 ng/dL   Free T3 (Triiodothryronine)    Collection Time: 02/22/25  5:52 PM   Result Value Ref Range    T3 Free 3.11 2.40 -  4.20 pg/mL   RAINBOW DRAW LAVENDER    Collection Time: 02/22/25  5:52 PM   Result Value Ref Range    Hold Lavender Auto Resulted    RAINBOW DRAW LIGHT GREEN    Collection Time: 02/22/25  5:52 PM   Result Value Ref Range    Hold Lt Green Auto Resulted    RAINBOW DRAW BLUE    Collection Time: 02/22/25  5:52 PM   Result Value Ref Range    Hold Blue Auto Resulted    EKG 12 Lead    Collection Time: 02/22/25  5:52 PM   Result Value Ref Range    Ventricular rate 124 BPM    Atrial rate  BPM    P-R Interval  ms    QRS Duration 70 ms    Q-T Interval 284 ms    QTC Calculation (Bezet) 408 ms    P Axis  degrees    R Axis 7 degrees    T Axis 49 degrees         IMAGING: XR CHEST AP PORTABLE  (CPT=71045)    Result Date: 2/22/2025  CONCLUSION:  1. Cardiomegaly with mild pulmonary venous congestion    Dictated by (CST): Aquilino Kaur MD on 2/22/2025 at 7:12 PM     Finalized by (CST): Aquilino Kaur MD on 2/22/2025 at 7:13 PM           I personally reviewed the radiology study and my finding were cardiomegaly with mild pulmonary vascular congestion      Procedures:   Procedures    EKG: Atrial fibrillation with a rapid rate of 124, normal intervals,, nonspecific ST-T wave changes without overt signs of acute ischemia, no old immediately available for comparison     ED COURSE          Re-Evaluation: Improved      Disposition & Plan:   Clinical Impression/Final Diagnosis:   1. Atrial fibrillation with rapid ventricular response (HCC)        Medical Decision Making: Patient arrives with atrial fibrillation with rapid ventricular response.  Heart rate initially quite elevated improved on diltiazem.  Blood pressure is stable, consulted cardiology will admit for further workup and management    Medical Decision Making  Amount and/or Complexity of Data Reviewed  Independent Historian:      Details: Family at the bedside  External Data Reviewed: notes.  Labs: ordered. Decision-making details documented in ED Course.  Radiology: ordered and  independent interpretation performed. Decision-making details documented in ED Course.  ECG/medicine tests: ordered and independent interpretation performed. Decision-making details documented in ED Course.  Discussion of management or test interpretation with external provider(s): Discussed with cardiologist who agrees with treatment plan no further recommendation at this time    Risk  OTC drugs.  Prescription drug management.  Drug therapy requiring intensive monitoring for toxicity.  Decision regarding hospitalization.  Risk Details: Broad differential considered including but not limited to acute coronary syndrome, electrolyte derangement, atrial fibrillation controlled on diltiazem drip cardiologist is consulted    Critical Care  Total time providing critical care: 42 minutes        *Please note that in the presenting to the emergency department, illness/injury that poses a threat to life or function is considered during this patient's initial evaluation.    The complexity of this visit is therefore inherently more complex given the need to consider life threatening pathology prior to any other etiology for this patient's visit.    The medical decision above exemplifies this rationale.     Disposition: Admit  There are no disposition comments on file for this visit.       [facility]          Admission disposition: 2/22/2025  7:41 PM             Disposition and Plan     Clinical Impression:  1. Atrial fibrillation with rapid ventricular response (HCC)         Disposition:  Admit  2/22/2025  7:41 pm    Follow-up:  No follow-up provider specified.  We recommend that you schedule follow up care with a primary care provider within the next three months to obtain basic health screening including reassessment of your blood pressure.      Medications Prescribed:  Current Discharge Medication List              Supplementary Documentation:         Hospital Problems       Present on Admission  Date Reviewed: 1/28/2025             ICD-10-CM Noted POA    Hyperglycemia R73.9 2/22/2025 Yes                                                  This note was generated in part using voice recognition dictation technology. The report was reviewed by this physician but still may have unintentional errors due to inherent limitations of voice recognition technology. All times are estimates.         [1] No Known Allergies

## 2025-02-23 NOTE — PLAN OF CARE
Problem: Patient Centered Care  Goal: Patient preferences are identified and integrated in the patient's plan of care  Description: Interventions:  - Provide timely, complete, and accurate information to patient/family  - Incorporate patient and family knowledge, values, beliefs, and cultural backgrounds into the planning and delivery of care  - Encourage patient/family to participate in care and decision-making at the level they choose  - Honor patient and family perspectives and choices  Outcome: Progressing     Problem: CARDIOVASCULAR - ADULT  Goal: Maintains optimal cardiac output and hemodynamic stability  Description: INTERVENTIONS:  - Monitor vital signs, rhythm, and trends  - Monitor for bleeding, hypotension and signs of decreased cardiac output  - Evaluate effectiveness of vasoactive medications to optimize hemodynamic stability  - Monitor arterial and/or venous puncture sites for bleeding and/or hematoma  - Assess quality of pulses, skin color and temperature  - Assess for signs of decreased coronary artery perfusion - ex. Angina  - Evaluate fluid balance, assess for edema, trend weights  Outcome: Progressing  Goal: Absence of cardiac arrhythmias or at baseline  Description: INTERVENTIONS:  - Continuous cardiac monitoring, monitor vital signs, obtain 12 lead EKG if indicated  - Evaluate effectiveness of antiarrhythmic and heart rate control medications as ordered  - Initiate emergency measures for life threatening arrhythmias  - Monitor electrolytes and administer replacement therapy as ordered  Outcome: Progressing     Problem: SAFETY ADULT - FALL  Goal: Free from fall injury  Description: INTERVENTIONS:  - Assess pt frequently for physical needs  - Identify cognitive and physical deficits and behaviors that affect risk of falls.  - [place] fall precautions as indicated by assessment.  - Educate pt/family on patient safety including physical limitations  - Instruct pt to call for assistance with  activity based on assessment  - Modify environment to reduce risk of injury  - Provide assistive devices as appropriate  - Consider OT/PT consult to assist with strengthening/mobility  - Encourage toileting schedule  Outcome: Progressing     Problem: DISCHARGE PLANNING  Goal: Discharge to home or other facility with appropriate resources  Description: INTERVENTIONS:  - Identify barriers to discharge w/pt and caregiver  - Include patient/family/discharge partner in discharge planning  - Arrange for needed discharge resources and transportation as appropriate  - Identify discharge learning needs (meds, wound care, etc)  - Arrange for interpreters to assist at discharge as needed  - Consider post-discharge preferences of patient/family/discharge partner  - Complete POLST form as appropriate  - Assess patient's ability to be responsible for managing their own health  - Refer to Case Management Department for coordinating discharge planning if the patient needs post-hospital services based on physician/LIP order or complex needs related to functional status, cognitive ability or social support system  Outcome: Progressing

## 2025-02-23 NOTE — SPIRITUAL CARE NOTE
Spiritual Care Visit Note    Patient Name: Narcisa Teague Date of Spiritual Care Visit: 25   : 1950 Primary Dx: Atrial fibrillation with rapid ventricular response (HCC)       Referred By: Referral From: Care Coordination    Spiritual Care Taxonomy:    Intended Effects: Build relationship of care and support    Methods: Collaborate with care team member;Offer emotional support;Offer spiritual/Gnosticist support;Offer support    Interventions: Acknowledge current situation;Acknowledge response to difficult experience;Active listening;Assist someone with Advance Directives;Silent prayer;Provide hospitality    Visit Type/Summary:     - PoA: Other: Provided education regarding PoA for Healthcare. Left PoA information with patient for review.   assessed for other spiritual needs.  Patient wants to read and decide later to sign the POA document. Son at bedside.  remains available for follow up.    Spiritual Care support can be requested via an Epic consult. For urgent/immediate needs, please contact the On Call  at: [place]: ext [phone]    Donn SPRAGUE  Chaplain Resident  [phone]

## 2025-02-23 NOTE — SPIRITUAL CARE NOTE
Spiritual Care Visit Note    Patient Name: Narcisa Teague Date of Spiritual Care Visit: 25   : 1950 Primary Dx: Atrial fibrillation with rapid ventricular response (HCC)       Referred By: Referral From:     Spiritual Care Taxonomy:    Intended Effects: Aligning care plan with patient's values    Methods: Collaborate with care team member    Interventions: Assist someone with Advance Directives    Visit Type/Summary:     - PoA: New PoAH Created: Visited patient in response to PoA for Healthcare consult/request. Created a new PoAH for Healthcare document that, per the patient, accurately reflects their wishes. Gave the patient the original PoAH document along with copies. Confirmed that the PoAH primary agent name and contact information have been entered into the Epic, Advance Directives section. A copy of the new PoAH document has been placed on the patient's paper chart. [employer] Interpretive services. Patient is decisional. Family members at bedside.   remains available for follow up.    Spiritual Care support can be requested via an Epic consult. For urgent/immediate needs, please contact the On Call  at: [place]: ext [phone]    Donn SPRAGUE  Chaplain Resident  [phone]

## 2025-02-24 ENCOUNTER — APPOINTMENT (OUTPATIENT)
Dept: ULTRASOUND IMAGING | Facility: HOSPITAL | Age: 75
End: 2025-02-24
Attending: INTERNAL MEDICINE
Payer: MEDICARE

## 2025-02-24 LAB
ANION GAP SERPL CALC-SCNC: 8 MMOL/L (ref 0–18)
BASOPHILS # BLD AUTO: 0.09 X10(3) UL (ref 0–0.2)
BASOPHILS NFR BLD AUTO: 0.8 %
BUN BLD-MCNC: 17 MG/DL (ref 9–23)
BUN/CREAT SERPL: 19.8 (ref 10–20)
CALCIUM BLD-MCNC: 8.9 MG/DL (ref 8.7–10.4)
CHLORIDE SERPL-SCNC: 106 MMOL/L (ref 98–112)
CO2 SERPL-SCNC: 24 MMOL/L (ref 21–32)
CREAT BLD-MCNC: 0.86 MG/DL
DEPRECATED RDW RBC AUTO: 42.2 FL (ref 35.1–46.3)
DIGOXIN SERPL-MCNC: 1.05 NG/ML (ref 0.8–2)
EGFRCR SERPLBLD CKD-EPI 2021: 70 ML/MIN/1.73M2 (ref 60–?)
EOSINOPHIL # BLD AUTO: 0.15 X10(3) UL (ref 0–0.7)
EOSINOPHIL NFR BLD AUTO: 1.3 %
ERYTHROCYTE [DISTWIDTH] IN BLOOD BY AUTOMATED COUNT: 15.5 % (ref 11–15)
GLUCOSE BLD-MCNC: 170 MG/DL (ref 70–99)
GLUCOSE BLDC GLUCOMTR-MCNC: 153 MG/DL (ref 70–99)
HCT VFR BLD AUTO: 41 %
HGB BLD-MCNC: 13.1 G/DL
IMM GRANULOCYTES # BLD AUTO: 0.02 X10(3) UL (ref 0–1)
IMM GRANULOCYTES NFR BLD: 0.2 %
LYMPHOCYTES # BLD AUTO: 4.92 X10(3) UL (ref 1–4)
LYMPHOCYTES NFR BLD AUTO: 41.1 %
MCH RBC QN AUTO: 24.3 PG (ref 26–34)
MCHC RBC AUTO-ENTMCNC: 32 G/DL (ref 31–37)
MCV RBC AUTO: 75.9 FL
MONOCYTES # BLD AUTO: 0.97 X10(3) UL (ref 0.1–1)
MONOCYTES NFR BLD AUTO: 8.1 %
NEUTROPHILS # BLD AUTO: 5.83 X10 (3) UL (ref 1.5–7.7)
NEUTROPHILS # BLD AUTO: 5.83 X10(3) UL (ref 1.5–7.7)
NEUTROPHILS NFR BLD AUTO: 48.5 %
OSMOLALITY SERPL CALC.SUM OF ELEC: 292 MOSM/KG (ref 275–295)
PLATELET # BLD AUTO: 253 10(3)UL (ref 150–450)
POTASSIUM SERPL-SCNC: 3.7 MMOL/L (ref 3.5–5.1)
POTASSIUM SERPL-SCNC: 4 MMOL/L (ref 3.5–5.1)
Q-T INTERVAL: 356 MS
QRS DURATION: 84 MS
QTC CALCULATION (BEZET): 494 MS
R AXIS: 26 DEGREES
RBC # BLD AUTO: 5.4 X10(6)UL
SODIUM SERPL-SCNC: 138 MMOL/L (ref 136–145)
T AXIS: 49 DEGREES
VENTRICULAR RATE: 116 BPM
WBC # BLD AUTO: 12 X10(3) UL (ref 4–11)

## 2025-02-24 PROCEDURE — 99233 SBSQ HOSP IP/OBS HIGH 50: CPT | Performed by: INTERNAL MEDICINE

## 2025-02-24 PROCEDURE — 99233 SBSQ HOSP IP/OBS HIGH 50: CPT | Performed by: EMERGENCY MEDICINE

## 2025-02-24 PROCEDURE — 93880 EXTRACRANIAL BILAT STUDY: CPT | Performed by: INTERNAL MEDICINE

## 2025-02-24 RX ORDER — METOPROLOL TARTRATE 1 MG/ML
5 INJECTION, SOLUTION INTRAVENOUS ONCE
Status: COMPLETED | OUTPATIENT
Start: 2025-02-24 | End: 2025-02-24

## 2025-02-24 RX ORDER — DIGOXIN 125 MCG
250 TABLET ORAL EVERY 6 HOURS
Status: COMPLETED | OUTPATIENT
Start: 2025-02-24 | End: 2025-02-24

## 2025-02-24 RX ORDER — SODIUM CHLORIDE 9 MG/ML
INJECTION, SOLUTION INTRAVENOUS CONTINUOUS
Status: DISCONTINUED | OUTPATIENT
Start: 2025-02-24 | End: 2025-02-25

## 2025-02-24 NOTE — SIGNIFICANT EVENT
RRT    *See RRT Documentation Record*    Reason the RRT was called: syncopal episode in the bathroom  Assessment of patient leading up to RRT: patient became diaphoretic and passed out while sitting on the toilet  Interventions/Testing: accucheck, IV bolus, labs, EKG, vitals taken  Patient Outcome/Disposition: remain in the unit  Family Notified: yes. Daughter at bedside during the event  Name of family notified: Priya( daughter )

## 2025-02-24 NOTE — PROGRESS NOTES
02/24/25 0858 02/24/25 0910 02/24/25 0913   Vital Signs   Pulse (!) 134 (!) 122 (!) 133   Heart Rate Source Monitor Monitor Monitor   Resp 18  --   --    Respiratory Quality Normal  --   --    /79 (!) 131/94 109/82   MAP (mmHg) 94 (!) 105 93   BP Location Right arm Right arm Right arm   BP Method Automatic Automatic Automatic   Patient Position Semi-Ruby Sitting Standing     Patient unable to finish standing blood pressure reading. Reported dizziness

## 2025-02-24 NOTE — PAYOR COMM NOTE
--------------  ADMISSION REVIEW     [place]or[place]ACE R[place]A [facility] [place] #:  [id number]  Authorization Number: [id number]    Admit date: 2/22/25  Admit time: 10:10 PM       ED Provider Notes          HISTORY OF PRESENT ILLNESS:   Narcisa Teague is a 74 year old female who presents to the ED with a past medical history including hypertension presents for evaluation after a near syncopal event earlier today.  Has been feeling generally weak for several days, so weak today that she almost fell to the ground.  Denies chest pain or dyspnea.  Feels that her heart is racing.  Denies fever chills.     PHYSICAL EXAM    /77   Pulse 94   Temp 96.7 °F (35.9 °C) (Temporal)   Resp 16   Ht 162.6 cm (5' 4\")   Wt 64.4 kg   SpO2 91%   BMI 24.37 kg/m²   /77   Pulse 94   Temp 96.7 °F (35.9 °C) (Temporal)   Resp 16   Ht 162.6 cm (5' 4\")   Wt 64.4 kg   SpO2 91%   BMI 24.37 kg/m²     General: A&Ox3  Head: atraumatic, normocephalic   Eyes: conjuctiva clear, no icterus, PERRL, EOMI, vision grossly normal  Ears: normal external appearance, no drainage  Nose:  Atraumatic, no swelling, no drainage, nares patent  Throat:  Moist pink mucous membranes, airway is patent  Neck:  Soft supple, no masses, no tracheal deviation, no stridor  Chest:  No bruising or abrasions, no tenderness, no deformity  Cardiac: Tachycardic and irregular  Lung: Faint basilar crackle  Abdomen:  Soft, nontender, nondistended, normal BS  Back: No stepoff/deformity  Extremities: FROM all ext, no deformities, intact equal peripheral pulses, no cyanosis or edema  Neuro: No facial droop, no slurred speech, moving all extremities freely, SILT to the bilateral upper and lower extremities  Psych: A&Ox3, normal affect, cooperative, calm  Skin: No rash, no petechiae/purpura, warm, dry        Results for orders placed or performed during the hospital encounter of 02/22/25   CBC With Differential With Platelet    Collection Time: 02/22/25  5:52 PM   Result  Value    WBC 9.0    RBC 4.87    HGB 12.1    HCT 37.7    MCV 77.4 (L)    MCH 24.8 (L)    MCHC 32.1    RDW-SD 43.4    RDW 15.6 (H)    .0    Neutrophil Absolute Prelim 7.44    Neutrophil Absolute 7.44    Lymphocyte Absolute 1.04    Monocyte Absolute 0.43    Eosinophil Absolute 0.00    Basophil Absolute 0.01    Immature Granulocyte Absolute 0.03    Neutrophil % 83.2    Lymphocyte % 11.6    Monocyte % 4.8    Eosinophil % 0.0    Basophil % 0.1    Immature Granulocyte % 0.3   Comp Metabolic Panel (14)    Collection Time: 02/22/25  5:52 PM   Result Value    Glucose 130 (H)    Sodium 137    Potassium 4.1    Chloride 103    CO2 25.0    Anion Gap 9    BUN 13    Creatinine 0.77    BUN/CREA Ratio 16.9    Calcium, Total 9.0    Calculated Osmolality 286    eGFR-Cr 80     (H)     (H)    Alkaline Phosphatase 95    Bilirubin, Total 0.4    Total Protein 7.3    Albumin 4.3    Globulin  3.0    A/G Ratio 1.4   Troponin I (High Sensitivity)    Collection Time: 02/22/25  5:52 PM   Result Value    Troponin I (High Sensitivity) 20   Magnesium    Collection Time: 02/22/25  5:52 PM   Result Value    Magnesium 2.2   Prothrombin Time (PT)    Collection Time: 02/22/25  5:52 PM   Result Value    PT 13.7    INR 0.99   TSH W Reflex To Free T4    Collection Time: 02/22/25  5:52 PM   Result Value    TSH 0.285 (L)   T4, FREE (S)    Collection Time: 02/22/25  5:52 PM   Result Value    Free T4 1.3   Free T3 (Triiodothryronine)    Collection Time: 02/22/25  5:52 PM   Result Value    T3 Free 3.11   XR CHEST AP PORTABLE  (CPT=71045)  Result Date: 2/22/2025  CONCLUSION:  1. Cardiomegaly with mild pulmonary venous congestion    Dictated by (CST): Aquilino Kaur MD on 2/22/2025 at 7:12 PM     Finalized by (CST): Aquilino Kaur MD on 2/22/2025 at 7:13 PM               EKG: Atrial fibrillation with a rapid rate of 124, normal intervals,, nonspecific ST-T wave changes without overt signs of acute ischemia, no old immediately available for  comparison        Disposition & Plan:   Clinical Impression/Final Diagnosis:   1. Atrial fibrillation with rapid ventricular response (HCC)      Admission disposition: 2/22/2025  7:41 PM    Disposition and Plan     Clinical Impression:  1. Atrial fibrillation with rapid ventricular response (HCC)       Disposition:  Admit  2/22/2025  7:41 pm         History & Physical     Assessment and Plan:     Atrial fibrillation with RVR, new onset.   Hemodynamically stable. [id number] 4 (Age/Sex) not on any anticoagulation  -Patient received Cardizem bolus and started on Cardizem drip in the ED with improvement of heart rate  -Cardiology consult   -Monitor on telemetry  -Monitor electrolytes  -Anticoagulation as above  -Rate/rhythm control with Cardizem  -Echo per cardiology     Concern for acute CHF  -Chest x-ray with pulmonary vascular congestion.  -Patient is on room air, does not require any oxygen supplementation.  D  -Noted to have a low BP after initiation of Cardizem. Will hold off on diuretics  -Cardiology on consult     Other medical conditions  Mitral valve stenosis  Carotid stenosis  Essential hypertension     Prophylaxis  Subcutaneous heparin     History of Present Illness:  Narcisa Teague is a(n) 74 year old female, who presents for evaluation of near syncopal episode/palpitations. PMHx significant for mitral valve stenosis, carotid stenosis, essential hypertension.  Patient accompanied by family members at bedside.  Patient speaks Welsh, history obtained by family members at bedside.  Patient has been feeling weak for the last couple of days and today she almost fell to the ground, did not hit her head did not lose consciousness.  She has been feeling palpitations.  A couple of weeks ago she felt the same but her symptoms were brief.  No recent weight gain or weight loss.  No PND or orthopnea.  Denies any dyspnea on exertion.  Denies any chest pain.  Denies any fever or chills.  Denies any extremity  swelling.  On presentation to the ED, patient was found to be in atrial fibrillation.  She was given a dose of Cardizem with improvement of her heart rate but then her blood pressure dropped to SBP 96/64, patient remained asymptomatic.  Patient denies any melena, hematochezia, hematemesis.  Lab work within normal limits.  Chest x-ray reveals cardiomegaly with mild pulmonary venous congestion.  Patient admitted under hospitalist service with consultation to cardiology.     Temp:  [96.7 °F (35.9 °C)] 96.7 °F (35.9 °C)  Pulse:  [] 56  Resp:  [11-22] 14  BP: ()/(55-94) 105/57  SpO2:  [91 %-98 %] 98 %     General:  Alert and oriented.  Diffuse skin problem:  None.  Eye:  Pupils are equal, round and reactive to light, extraocular movements are intact, Normal conjunctiva.  HENT:  Normocephalic, oral mucosa is moist.  Head:  Normocephalic, atraumatic.  Neck:  Supple, non-tender, no carotid bruit, no jugular venous distention, no lymphadenopathy, no thyromegaly.  Respiratory:  Lungs are clear to auscultation, respirations are non-labored, breath sounds are equal, symmetrical chest wall expansion.  Cardiovascular: Irregularly irregular, no murmur, no edema.  Gastrointestinal:  Soft, non-tender, non-distended, normal bowel sounds, no organomegaly.  Lymphatics:  No lymphadenopathy neck, axilla, groin.  Musculoskeletal: Normal range of motion.  normal strength.  Feet:  Normal pulses.  Neurologic:  Alert, oriented, no focal deficits, cranial nerves II-XII are grossly intact.  Cognition and Speech:  Oriented, speech clear and coherent.  Psychiatric:  Cooperative, appropriate mood & affect.     Lab Results   Component Value Date     WBC 9.0 02/22/2025     HGB 12.1 02/22/2025     HCT 37.7 02/22/2025     .0 02/22/2025     CREATSERUM 0.77 02/22/2025     BUN 13 02/22/2025      02/22/2025     K 4.1 02/22/2025      02/22/2025     CO2 25.0 02/22/2025      02/22/2025     CA 9.0 02/22/2025     ALB 4.3  02/22/2025     ALKPHO 95 02/22/2025     BILT 0.4 02/22/2025     TP 7.3 02/22/2025      02/22/2025      02/22/2025     INR 0.99 02/22/2025     T4F 1.3 02/22/2025     TSH 0.285 02/22/2025     MG 2.2 02/22/2025         Imaging:  XR CHEST AP PORTABLE  (CPT=71045)   Result Date: 2/22/2025  CONCLUSION:         1. Cardiomegaly with mild pulmonary venous congestion    Dictated by (CST): Aquilino Kaur MD on 2/22/2025 at 7:12 PM     Finalized by (CST): Aquilino Kaur MD on 2/22/2025 at 7:13 PM                       2/23/25        Subjective:   Telemetry showing A-fib 110s     Temp:  [96.7 °F (35.9 °C)-98.4 °F (36.9 °C)] 98.4 °F (36.9 °C)  Pulse:  [] 117  Resp:  [11-22] 18  BP: ()/(55-94) 102/74  SpO2:  [90 %-98 %] 97 %     Physical Exam:    General: Alert ,         Respiratory: Clear to auscultation bilaterally. No wheezes. No rhonchi.  Cardiovascular: S1, S2.  IR IR. No murmurs, rubs or gallops.   Abdomen: Soft, nontender, nondistended.  Positive bowel sounds. No rebound or guarding.  Neurologic: No focal neurological deficits.   Musculoskeletal: Moves all extremities.  Extremities: No edema.        Labs Last 24 Hours:                      BMP         CBC       Other      Na 137 Cl 103 BUN 13 Glu 130     Hb 12.1     PTT -     K 4.1 CO2 25.0 Cr 0.77     WBC 9.0 >< .0   INR 0.99      [facility] End[facility]       Hct 37.7           Ca 9.0        LFT                  APk 95 Prot 7.3          Mg 2.2 TSH 0.285      T carrie 0.4 Alb 4.3             Assessment & Plan:     #Atrial fibrillation with RVR, new onset.   Hemodynamically stable. [id number] 4 (Age/Sex) not on any anticoagulation  -Patient received Cardizem bolus and started on Cardizem drip in the ED with improvement of heart rate  -Cardiology consult   -Monitor on telemetry  -Monitor electrolytes  -Anticoagulation as above  -Rate/rhythm control with Cardizem  -2/23: Transitioned off of IV diltiazem today onto oral metoprolol.  Awaiting  echocardiogram results.  A-fib on telemetry still in low 100s.    Aspirin discontinued; possibly home tomorrow pending echo results and vital signs     Concern for acute CHF  -Chest x-ray with pulmonary vascular congestion.  -Patient is on room air, does not require any oxygen supplementation.  Does not appear fluid overloaded on physical exam.  -Noted to have a low BP after initiation of Cardizem. Will hold off on diuretics  -Cardiology on consult     Other medical conditions  Mitral valve stenosis  Carotid stenosis  Essential hypertension     Prophylaxis  Subcutaneous heparin                     2/24/25  On call Nocturnist 02/24/25     Rapid response called for syncopal episode.  Was on toilet passed out.  Did not fall to ground no injury.  Patient is diaphoretic no resp distress.  Patient placed in bed put in trendelenburg.  HR low 100's afib.  Sbp 90's.  She is able to follow commands.     -stat EKG  -stat cbc sent  -will update primary service.      CARDIOLOGY     Patient is a 74 year old female who was admitted to the hospital for Atrial fibrillation with rapid ventricular response (HCC):  Subjective:  Patient had syncopal episode and was found to be orthostatic hypotension  Patient denies any CP/SOB at rest/Palpitations now  C Monitor: AFIB  Patient remains in AFIB with intermittent RVR     Current Facility-Administered Medications   Medication Dose Route Frequency    sodium chloride 0.9% infusion   Intravenous Continuous    metoprolol succinate ER (Toprol XL) 24 hr tab 100 mg  100 mg Oral Daily Beta Blocker    apixaban (Eliquis) tab 5 mg  5 mg Oral BID    bumetanide (Bumex) tab 1 mg  1 mg Oral Daily    dilTIAZem ER (Dilacor XR) 24 hr cap 120 mg  120 mg Oral Daily    atorvastatin (Lipitor) tab 40 mg  40 mg Oral Nightly    ondansetron (Zofran) 4 MG/2ML injection 4 mg  4 mg Intravenous Q6H PRN    polyethylene glycol (PEG 3350) (Miralax) 17 g oral packet 17 g  17 g Oral Daily PRN    sennosides (Senokot) tab 17.2  mg  17.2 mg Oral Nightly PRN    bisacodyl (Dulcolax) 10 MG rectal suppository 10 mg  10 mg Rectal Daily PRN    fleet enema (Fleet) rectal enema 133 mL  1 enema Rectal Once PRN    acetaminophen (Tylenol Extra Strength) tab 500 mg  500 mg Oral Q4H PRN       Blood pressure 109/82, pulse (!) 133, temperature 97.9 °F (36.6 °C), temperature source Oral, resp. rate 18, height 5' 4\" (1.626 m), weight 125 lb (56.7 kg), SpO2 96%.       Scheduled Meds:    metoprolol succinate  100 mg Oral Daily Beta Blocker    apixaban  5 mg Oral BID    bumetanide  1 mg Oral Daily    dilTIAZem ER  120 mg Oral Daily    atorvastatin  40 mg Oral Nightly      Continuous Infusions:    sodium chloride        Physical Exam:  Neck: No JVD, no bruits.  Cardiac: Tachy/Irregular/Systolic murmur  Lungs: Clear without rhales, rhochi or wheezing.  Abdomen: Soft, non-tender. BS+  Extremities: No edema.    Neurologic: Alert and moving all 4 extremities.  Psychiatry: Patient has calm affect.     Lab Results   Component Value Date     WBC 12.0 (H) 02/24/2025     HGB 13.1 02/24/2025     HCT 41.0 02/24/2025     .0 02/24/2025     CREATSERUM 0.86 02/24/2025     BUN 17 02/24/2025      02/24/2025     K 3.7 02/24/2025      02/24/2025     CO2 24.0 02/24/2025      (H) 02/24/2025     CA 8.9 02/24/2025     ALB 4.3 02/22/2025     ALKPHO 95 02/22/2025     TP 7.3 02/22/2025      (H) 02/22/2025      (H) 02/22/2025     INR 0.99 02/22/2025     PTP 13.7 02/22/2025     T4F 1.3 02/22/2025     TSH 0.285 (L) 02/22/2025     MG 2.2 02/22/2025          Imaging:  EKG: Atrial fibrillation with rapid ventricular response   Abnormal ECG   ECHO: (Prelim)  LVH with LV EF 55%/ mod LAE/ 2+ MR and 1+ AI and 2+ TR with Mild Pulmonary HTN      IMPRESSION:  Assessment / Plan  1. New onset atrial fibrillation with rapid ventricular response -  Started on cardizem drip. Will resume metoprolol succinate 100 mg daily. Previously had short episodes of atrial  tachycardia but no atrial fibrillation. Elevated CHADS2-VASc score, will start on Eliquis 5 mg twice daily and stop aspirin.     I48.91 - Unspecified atrial fibrillation     2. Coronary artery disease -  Continue statin therapy.  I25.10 - Atherosclerotic heart disease of native coronary artery without angina pectoris     3. Hypertension -  Blood pressure has been on the low side. She describes some orthostatic symptoms. Will remove some of her blood pressure medications. If necessary for rate control, may have to add oral diltiazem.     I10 - Essential (primary) hypertension     4. Acute on chronic diastolic heart failure -  Resume long-term bumetanide daily.     I50.32 - Chronic diastolic (congestive) heart failure     5. History of mitral regurgitation and mitral stenosis -  Appears to be volume related with gradients dynamically decreasing as volume status improved and mitral regurgitation improved. This would not be a contraindication for using DOAC for anticoagulation.  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 Near syncope -  She has previously had dizziness. Suspect she may have orthostatic hypotension. Will liberalize her blood pressure targets.  orthostatic blood pressures  s/o orthostatic hypotension, associated with syncope      R55 - Syncope and collapse     RECOMMENDATIONS:  Echo (prelim) reviewed  Patient is in AFIB with VR ~ 120-130  -130  Orthostatic Hypotension +     Will give IV Fluids  Hold Bumex  Digoxin for rate control as needed (received 2 doses of 0.25 mg )   Continue Eliquis, follow H&H  Follow BNP     Consider support stockings if still symptomatic orthostatic hypotension           MEDICATIONS ADMINISTERED IN LAST 1 DAY:  apixaban (Eliquis) tab 5 mg       Date Action Dose Route User    2/24/2025 0901 Given 5 mg Oral Lesli Lindo, RN    2/23/2025 2010 Given 5 mg Oral Joi Brown, RN          atorvastatin (Lipitor) tab 40 mg        Date Action Dose Route User    2/23/2025 2010 Given 40 mg Oral Joi Brown RN          bumetanide (Bumex) tab 1 mg       Date Action Dose Route User    2/23/2025 1640 Given 1 mg Oral Cyndi Cantor RN          digoxin (Lanoxin) tab 250 mcg       Date Action Dose Route User    2/24/2025 0716 Given 250 mcg Oral Joi Brown RN    2/24/2025 0111 Given 250 mcg Oral Joi Brown RN          dilTIAZem ER (Dilacor XR) 24 hr cap 120 mg       Date Action Dose Route User    2/23/2025 1640 Given 120 mg Oral Cyndi Cantor RN          sodium chloride 0.9 % IV bolus 250 mL       Date Action Dose Route User    2/24/2025 0525 New Bag 250 mL Intravenous Joi Brown RN            Vitals (last day)       Date/Time Temp Pulse Resp BP SpO2 Weight O2 Device O2 Flow Rate (L/min) [facility]    02/24/25 0913 -- 133 -- 109/82 -- -- -- --     02/24/25 0910 -- 122 -- 131/94 -- -- -- --     02/24/25 0858 97.9 °F (36.6 °C) 134 18 130/79 96 % -- None (Room air) --     02/24/25 0502 98 °F (36.7 °C) 132 18 121/91 96 % -- None (Room air) --     02/24/25 0440 -- 127 -- -- -- -- -- --     02/24/25 0111 -- 131 -- -- -- -- -- --     02/24/25 0100 -- 121 -- -- -- -- -- --     02/24/25 0032 -- -- -- 102/79 -- -- -- --     02/23/25 2006 98.3 °F (36.8 °C) 131 18 95/78 95 % -- None (Room air) --     02/23/25 1944 -- 147 -- -- -- -- -- --     02/23/25 1900 -- 124 -- -- -- -- -- --     02/23/25 1557 -- -- -- 122/78 -- -- -- -- MWA    02/23/25 1554 -- 119 16 111/80 98 % -- None (Room air) -- MWA    02/23/25 1553 -- 123 16 114/80 97 % -- None (Room air) -- MWA    02/23/25 1200 -- 124 16 113/77 98 % -- None (Room air) -- MWA    02/23/25 1022 98.4 °F (36.9 °C) 117 18 102/74 97 % -- None (Room air) -- MWA    02/23/25 0429 98.4 °F (36.9 °C) 103 16 113/68 98 % -- None (Room air) -- [facility]B

## 2025-02-24 NOTE — PLAN OF CARE
Problem: Patient Centered Care  Goal: Patient preferences are identified and integrated in the patient's plan of care  Description: Interventions:  - What would you like us to know as we care for you? From home with son  - Provide timely, complete, and accurate information to patient/family  - Incorporate patient and family knowledge, values, beliefs, and cultural backgrounds into the planning and delivery of care  - Encourage patient/family to participate in care and decision-making at the level they choose  - Honor patient and family perspectives and choices  Outcome: Progressing     Problem: CARDIOVASCULAR - ADULT  Goal: Maintains optimal cardiac output and hemodynamic stability  Description: INTERVENTIONS:  - Monitor vital signs, rhythm, and trends  - Monitor for bleeding, hypotension and signs of decreased cardiac output  - Evaluate effectiveness of vasoactive medications to optimize hemodynamic stability  - Monitor arterial and/or venous puncture sites for bleeding and/or hematoma  - Assess quality of pulses, skin color and temperature  - Assess for signs of decreased coronary artery perfusion - ex. Angina  - Evaluate fluid balance, assess for edema, trend weights  Outcome: Progressing     Problem: SAFETY ADULT - FALL  Goal: Free from fall injury  Description: INTERVENTIONS:  - Assess pt frequently for physical needs  - Identify cognitive and physical deficits and behaviors that affect risk of falls.  - [place] fall precautions as indicated by assessment.  - Educate pt/family on patient safety including physical limitations  - Instruct pt to call for assistance with activity based on assessment  - Modify environment to reduce risk of injury  - Provide assistive devices as appropriate  - Consider OT/PT consult to assist with strengthening/mobility  - Encourage toileting schedule  Outcome: Progressing     Problem: DISCHARGE PLANNING  Goal: Discharge to home or other facility with appropriate  resources  Description: INTERVENTIONS:  - Identify barriers to discharge w/pt and caregiver  - Include patient/family/discharge partner in discharge planning  - Arrange for needed discharge resources and transportation as appropriate  - Identify discharge learning needs (meds, wound care, etc)  - Arrange for interpreters to assist at discharge as needed  - Consider post-discharge preferences of patient/family/discharge partner  - Complete POLST form as appropriate  - Assess patient's ability to be responsible for managing their own health  - Refer to Case Management Department for coordinating discharge planning if the patient needs post-hospital services based on physician/LIP order or complex needs related to functional status, cognitive ability or social support system  Outcome: Progressing     Problem: METABOLIC/FLUID AND ELECTROLYTES - ADULT  Goal: Electrolytes maintained within normal limits  Description: INTERVENTIONS:  - Monitor labs and rhythm and assess patient for signs and symptoms of electrolyte imbalances  - Administer electrolyte replacement as ordered  - Monitor response to electrolyte replacements, including rhythm and repeat lab results as appropriate  - Fluid restriction as ordered  - Instruct patient on fluid and nutrition restrictions as appropriate  Outcome: Progressing     Problem: Patient/Family Goals  Goal: Patient/Family Long Term Goal  Description: Patient's Long Term Goal: discharge from hospital    Interventions:  - monitor vital signs   - monitor appropriate labs  - pain management   - administer medications per order  - follow MD orders  - diagnostics per order  - update and inform patient and family on plan of care  - discharge planning  - See additional Care Plan goals for specific interventions  Outcome: Progressing     Problem: SKIN/TISSUE INTEGRITY - ADULT  Goal: Skin integrity remains intact  Description: INTERVENTIONS  - Assess and document risk factors for pressure ulcer  development  - Assess and document skin integrity  - Monitor for areas of redness and/or skin breakdown  - Initiate interventions, skin care algorithm/standards of care as needed  Outcome: Progressing     Problem: HEMATOLOGIC - ADULT  Goal: Free from bleeding injury  Description: (Example usage: patient with low platelets)  INTERVENTIONS:  - Avoid intramuscular injections, enemas and rectal medication administration  - Ensure safe mobilization of patient  - Hold pressure on venipuncture sites to achieve adequate hemostasis  - Assess for signs and symptoms of internal bleeding  - Monitor lab trends  - Patient is to report abnormal signs of bleeding to staff  - Avoid use of toothpicks and dental floss  - Use electric shaver for shaving  - Use soft bristle tooth brush  - Limit straining and forceful nose blowing  Outcome: Progressing     Problem: PAIN - ADULT  Goal: Verbalizes/displays adequate comfort level or patient's stated pain goal  Description: INTERVENTIONS:  - Encourage pt to monitor pain and request assistance  - Assess pain using appropriate pain scale  - Administer analgesics based on type and severity of pain and evaluate response  - Implement non-pharmacological measures as appropriate and evaluate response  - Consider cultural and social influences on pain and pain management  - Manage/alleviate anxiety  - Utilize distraction and/or relaxation techniques  - Monitor for opioid side effects  - Notify MD/LIP if interventions unsuccessful or patient reports new pain  - Anticipate increased pain with activity and pre-medicate as appropriate  Outcome: Progressing     Problem: Altered Communication/Language Barrier  Goal: Patient/Family is able to understand and participate in their care  Description: Interventions:  - Assess communication ability and preferred communication style  - Implement communication aides and strategies  - Use visual cues when possible  - Listen attentively, be patient, do not  interrupt  - Minimize distractions  - Allow time for understanding and response  - Establish method for patient to ask for assistance (call light)  - Provide an  as needed  - Communicate barriers and strategies to overcome with those who interact with patient  Outcome: Progressing     Problem: CARDIOVASCULAR - ADULT  Goal: Absence of cardiac arrhythmias or at baseline  Description: INTERVENTIONS:  - Continuous cardiac monitoring, monitor vital signs, obtain 12 lead EKG if indicated  - Evaluate effectiveness of antiarrhythmic and heart rate control medications as ordered  - Initiate emergency measures for life threatening arrhythmias  - Monitor electrolytes and administer replacement therapy as ordered  Outcome: Not Progressing     Problem: Patient/Family Goals  Goal: Patient/Family Short Term Goal  Description: Patient's Short Term Goal: control heart rate    Interventions:   - monitor vital signs   - monitor appropriate labs  - pain management   - administer medications per order  - follow MD orders  - diagnostics per order  - update and inform patient and family on plan of care  - See additional Care Plan goals for specific interventions  Outcome: Not Progressing    Patient tolerating room air. Cardizem and bumex held per Dr. Stringer's orders. Given 1x dose of intravenous metoprolol for increased heart rate per cardiology orders. Started on intravenous fluids. Orthostatic positive during the morning.

## 2025-02-24 NOTE — PROGRESS NOTES
[facility]  part of [facility]    Cardiology Hospital Progress Note       Narcisa Teague Patient Status:  Inpatient    1950 MRN [id number]   Location [facility] 3W/SW Attending Shahida Stringer MD   Hosp Day # 2 PCP Jesenia Smith MD       Patient is a 74 year old female who was admitted to the hospital for Atrial fibrillation with rapid ventricular response (HCC):  Subjective:  Patient seen in the room, daughter by the bedside  Patient had syncopal episode and was found to be orthostatic hypotension  Patient denies any CP/SOB at rest/Palpitations now  C Monitor: AFIB  Patient remains in AFIB with intermittent RVR      Past Medical History:   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, right    R thenar mass    Essential hypertension    High blood pressure    PONV (postoperative nausea and vomiting)       Past Surgical History:  Past Surgical History:   Procedure Laterality Date          Colonoscopy      Buddy    Excis tumor/avm,deep,hand/fingr Right     Excision R thenar mass    Knee replacement surgery Right 2019       Family History:  Family History   Family history unknown: Yes       Social History:  Pediatric History   Patient Parents    Not on file     Other Topics Concern    Not on file   Social History Narrative    Not on file           Current Medications:  Current Facility-Administered Medications   Medication Dose Route Frequency    sodium chloride 0.9% infusion   Intravenous Continuous    metoprolol succinate ER (Toprol XL) 24 hr tab 100 mg  100 mg Oral Daily Beta Blocker    apixaban (Eliquis) tab 5 mg  5 mg Oral BID    bumetanide (Bumex) tab 1 mg  1 mg Oral Daily    dilTIAZem ER (Dilacor XR) 24 hr cap 120 mg  120 mg Oral Daily    atorvastatin (Lipitor) tab 40 mg  40 mg Oral Nightly    ondansetron (Zofran) 4 MG/2ML injection 4 mg  4 mg Intravenous Q6H PRN    polyethylene glycol (PEG 3350) (Miralax) 17 g oral packet  17 g  17 g Oral Daily PRN    sennosides (Senokot) tab 17.2 mg  17.2 mg Oral Nightly PRN    bisacodyl (Dulcolax) 10 MG rectal suppository 10 mg  10 mg Rectal Daily PRN    fleet enema (Fleet) rectal enema 133 mL  1 enema Rectal Once PRN    acetaminophen (Tylenol Extra Strength) tab 500 mg  500 mg Oral Q4H PRN     Medications Prior to Admission   Medication Sig    atorvastatin 40 MG Oral Tab Take 1 tablet every day by oral route. (Patient taking differently: Take 1 tablet (40 mg total) by mouth daily. Take 1 tablet every day by oral route.)    metoprolol succinate  MG Oral Tablet 24 Hr Take 0.5 tablets (50 mg total) by mouth daily.    amLODIPine 2.5 MG Oral Tab Take 1 tablet (2.5 mg total) by mouth daily.    losartan 50 MG Oral Tab Take 1 tablet (50 mg total) by mouth daily.    aspirin 81 MG Oral Chew Tab aspirin 81 mg chewable tablet   Chew 1 tablet every day by oral route.    bumetanide 1 MG Oral Tab Take 1 tablet (1 mg total) by mouth daily. (Patient not taking: Reported on 2/22/2025)       Allergies:  Allergies[1]    Review of Systems:   A comprehensive 12 point review of system was performed.  All pertinent positive and negative findings per HPI.    Physical Exam:   Blood pressure 109/82, pulse (!) 133, temperature 97.9 °F (36.6 °C), temperature source Oral, resp. rate 18, height 5' 4\" (1.626 m), weight 125 lb (56.7 kg), SpO2 96%.  Intake/Output:   Last 3 shifts: [id number]   This shift: No intake/output data recorded.     Vent Settings:      Hemodynamic parameters (last 24 hours):      Scheduled Meds:    metoprolol succinate  100 mg Oral Daily Beta Blocker    apixaban  5 mg Oral BID    bumetanide  1 mg Oral Daily    dilTIAZem ER  120 mg Oral Daily    atorvastatin  40 mg Oral Nightly       Continuous Infusions:    sodium chloride           Physical Exam:    General: No acute distress.  HEENT: NAD  Neck: No JVD, no bruits.  Cardiac: Tachy/Irregular/Systolic murmur  Lungs: Clear without rhales, rhochi or  wheezing.  Abdomen: Soft, non-tender. BS+  Extremities: No edema.    Neurologic: Alert and moving all 4 extremities.  Psychiatry: Patient has calm affect.      Results:     Laboratory Data:  Lab Results   Component Value Date    WBC 12.0 (H) 02/24/2025    HGB 13.1 02/24/2025    HCT 41.0 02/24/2025    .0 02/24/2025    CREATSERUM 0.86 02/24/2025    BUN 17 02/24/2025     02/24/2025    K 3.7 02/24/2025     02/24/2025    CO2 24.0 02/24/2025     (H) 02/24/2025    CA 8.9 02/24/2025    ALB 4.3 02/22/2025    ALKPHO 95 02/22/2025    TP 7.3 02/22/2025     (H) 02/22/2025     (H) 02/22/2025    INR 0.99 02/22/2025    PTP 13.7 02/22/2025    T4F 1.3 02/22/2025    TSH 0.285 (L) 02/22/2025    MG 2.2 02/22/2025    TROP <0.045 03/01/2019    B12 1,033 (H) 05/23/2024         Imaging:  EKG: Atrial fibrillation with rapid ventricular response   Abnormal ECG   ECHO: (Prelim)  LVH with LV EF 55%/ mod LAE/ 2+ MR and 1+ AI and 2+ TR with Mild Pulmonary HTN     IMPRESSION:  Assessment / Plan  1. New onset atrial fibrillation with rapid ventricular response -  Started on cardizem drip. Will resume metoprolol succinate 100 mg daily. Previously had short episodes of atrial tachycardia but no atrial fibrillation. Elevated CHADS2-VASc score, will start on Eliquis 5 mg twice daily and stop aspirin.     I48.91 - Unspecified atrial fibrillation     2. Coronary artery disease -  Continue statin therapy.  I25.10 - Atherosclerotic heart disease of native coronary artery without angina pectoris     3. Hypertension -  Blood pressure has been on the low side. She describes some orthostatic symptoms. Will remove some of her blood pressure medications. If necessary for rate control, may have to add oral diltiazem.     I10 - Essential (primary) hypertension     4. Acute on chronic diastolic heart failure -  Resume long-term bumetanide daily.     I50.32 - Chronic diastolic (congestive) heart failure     5. History of  mitral regurgitation and mitral stenosis -  Appears to be volume related with gradients dynamically decreasing as volume status improved and mitral regurgitation improved. This would not be a contraindication for using DOAC for anticoagulation.  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 Near syncope -  She has previously had dizziness. Suspect she may have orthostatic hypotension. Will liberalize her blood pressure targets.  orthostatic blood pressures  s/o orthostatic hypotension, associated with syncope      R55 - Syncope and collapse    RECOMMENDATIONS:  Echo (prelim) reviewed  Patient is in AFIB with VR ~ 120-130  -130  Orthostatic Hypotension +    Will give IV Fluids  Hold Bumex  Digoxin for rate control as needed (received 2 doses of 0.25 mg )   Continue Eliquis, follow H&H  Follow BNP    Consider support stockings if still symptomatic orthostatic hypotension        D/W Patient and daughter and nursing staff    Please do not hesitate to call with questions.    Saeid Smith MD  [facility]  [address]  [place] [address]  [phone]      Addendum:   At ~ 4 pm patient converted to NSR in 60s  BP stable  Will continue to monitor    D/W Nursing staff    This note was prepared using Dragon Medical voice recognition dictation software. As a result errors may occur. When identified these errors have been corrected. While every attempt is made to correct errors during dictation discrepancies may still exist          [1] No Known Allergies

## 2025-02-24 NOTE — PLAN OF CARE
Problem: Patient Centered Care  Goal: Patient preferences are identified and integrated in the patient's plan of care  Description: Interventions:  - What would you like us to know as we care for you? From home with family  - Provide timely, complete, and accurate information to patient/family  - Incorporate patient and family knowledge, values, beliefs, and cultural backgrounds into the planning and delivery of care  - Encourage patient/family to participate in care and decision-making at the level they choose  - Honor patient and family perspectives and choices  Outcome: Progressing     Problem: CARDIOVASCULAR - ADULT  Goal: Maintains optimal cardiac output and hemodynamic stability  Description: INTERVENTIONS:  - Monitor vital signs, rhythm, and trends  - Monitor for bleeding, hypotension and signs of decreased cardiac output  - Evaluate effectiveness of vasoactive medications to optimize hemodynamic stability  - Monitor arterial and/or venous puncture sites for bleeding and/or hematoma  - Assess quality of pulses, skin color and temperature  - Assess for signs of decreased coronary artery perfusion - ex. Angina  - Evaluate fluid balance, assess for edema, trend weights  Outcome: Progressing  Goal: Absence of cardiac arrhythmias or at baseline  Description: INTERVENTIONS:  - Continuous cardiac monitoring, monitor vital signs, obtain 12 lead EKG if indicated  - Evaluate effectiveness of antiarrhythmic and heart rate control medications as ordered  - Initiate emergency measures for life threatening arrhythmias  - Monitor electrolytes and administer replacement therapy as ordered  Outcome: Progressing     Problem: SAFETY ADULT - FALL  Goal: Free from fall injury  Description: INTERVENTIONS:  - Assess pt frequently for physical needs  - Identify cognitive and physical deficits and behaviors that affect risk of falls.  - [place] fall precautions as indicated by assessment.  - Educate pt/family on patient safety  including physical limitations  - Instruct pt to call for assistance with activity based on assessment  - Modify environment to reduce risk of injury  - Provide assistive devices as appropriate  - Consider OT/PT consult to assist with strengthening/mobility  - Encourage toileting schedule  Outcome: Progressing     Problem: DISCHARGE PLANNING  Goal: Discharge to home or other facility with appropriate resources  Description: INTERVENTIONS:  - Identify barriers to discharge w/pt and caregiver  - Include patient/family/discharge partner in discharge planning  - Arrange for needed discharge resources and transportation as appropriate  - Identify discharge learning needs (meds, wound care, etc)  - Arrange for interpreters to assist at discharge as needed  - Consider post-discharge preferences of patient/family/discharge partner  - Complete POLST form as appropriate  - Assess patient's ability to be responsible for managing their own health  - Refer to Case Management Department for coordinating discharge planning if the patient needs post-hospital services based on physician/LIP order or complex needs related to functional status, cognitive ability or social support system  Outcome: Progressing     Problem: METABOLIC/FLUID AND ELECTROLYTES - ADULT  Goal: Electrolytes maintained within normal limits  Description: INTERVENTIONS:  - Monitor labs and rhythm and assess patient for signs and symptoms of electrolyte imbalances  - Administer electrolyte replacement as ordered  - Monitor response to electrolyte replacements, including rhythm and repeat lab results as appropriate  - Fluid restriction as ordered  - Instruct patient on fluid and nutrition restrictions as appropriate  Outcome: Progressing     Patient alert and oriented x4.  No complaints of pain at this time.  Afib on tele.  Digoxin given for increase HR.  Call light within reach.  Safety precautions in place.  Plan for home once medically cleared.

## 2025-02-24 NOTE — PROGRESS NOTES
On call [employer] 02/24/25    Rapid response called for syncopal episode.  Was on toilet passed out.  Did not fall to ground no injury.  Patient is diaphoretic no resp distress.  Patient placed in bed put in trendelenburg.  HR low 100's afib.  Sbp 90's.  She is able to follow commands.    -stat EKG  -stat cbc sent  -will update primary service.

## 2025-02-24 NOTE — PROGRESS NOTES
Progress Note     Narcisa Teague Patient Status:  Inpatient    1950 MRN [id number]   Location [facility] 3W/SW Attending Shahida Stringer MD   Hosp Day # 2 PCP Jesenia Smith MD     Chief Complaint: Atrial fibrillation with RVR, new onset.    Subjective:   S:     Patient overall doing well this morning however at 5 AM did have an RRT due to hypotension and syncope.  At the time 96/67 blood pressure and was in atrial fibrillation in the 50s.  Currently orthostatic positive and dizzy with standing systolic sitting 130s down to 100 with standing.  Telemetry A-fib in the 120s    Daughter at bedside plan of care discussed    No other acute complaints or other nursing concerns or overnight events    Telemetry showing A-fib 110s    Review of Systems:   10 point ROS completed and was negative, except for pertinent positive and negatives stated in subjective.    Objective:   Vital signs:  Temp:  [97.9 °F (36.6 °C)-98.3 °F (36.8 °C)] 98.2 °F (36.8 °C)  Pulse:  [] 130  Resp:  [14-18] 17  BP: ()/(67-99) 107/86  SpO2:  [94 %-98 %] 97 %    Wt Readings from Last 6 Encounters:   25 125 lb (56.7 kg)   24 133 lb (60.3 kg)   24 133 lb (60.3 kg)   24 133 lb 12.8 oz (60.7 kg)   23 138 lb (62.6 kg)   23 138 lb 9.6 oz (62.9 kg)         Physical Exam:    General: No acute distress. Alert ,         Respiratory: Clear to auscultation bilaterally. No wheezes. No rhonchi.  Cardiovascular: S1, S2.  IR IR. No murmurs, rubs or gallops.   Abdomen: Soft, nontender, nondistended.  Positive bowel sounds. No rebound or guarding.  Neurologic: No focal neurological deficits.   Musculoskeletal: Moves all extremities.  Extremities: No edema.    Results:   Diagnostic Data:      Labs:    Labs Last 24 Hours:   BMP     CBC    Other     Na 138 Cl 106 BUN 17 Glu 170   Hb 13.1   PTT - Procal -   K 3.7 CO2 24.0 Cr 0.86   WBC 12.0 >< .0  INR - CRP -   Renal Lytes Endo    Hct 41.0   Trop  - D dim -   eGFR - Ca 8.9 POC Gluc  153    LFT   pBNP - Lactic -   eGFR AA - PO4 - A1c -   AST - APk - Prot -  LDL -     Mg - TSH -   ALT - T carrie - Alb -        COVID-19 Lab Results    COVID-19  Lab Results   Component Value Date    COVID19 Not Detected 08/14/2021    COVID19 Not Detected 02/17/2021       Pro-Calcitonin  No results for input(s): \"PCT\" in the last 168 hours.    Cardiac  No results for input(s): \"TROP\", \"PBNP\" in the last 168 hours.    Creatinine Kinase  No results for input(s): \"CK\" in the last 168 hours.    Inflammatory Markers  No results for input(s): \"CRP\", \"CATHERINE\", \"LDH\", \"DDIMER\" in the last 168 hours.    Imaging: Imaging data reviewed in Epic.    Medications:    metoprolol succinate  100 mg Oral Daily Beta Blocker    apixaban  5 mg Oral BID    [Held by provider] bumetanide  1 mg Oral Daily    dilTIAZem ER  120 mg Oral Daily    atorvastatin  40 mg Oral Nightly       Assessment & Plan:   ASSESSMENT / PLAN:     #Atrial fibrillation with RVR, new onset.   Hemodynamically stable. [id number] 4 (Age/Sex) not on any anticoagulation  -Patient received Cardizem bolus and started on Cardizem drip in the ED with improvement of heart rate  -Cardiology consult   -Monitor on telemetry  -Monitor electrolytes  -Anticoagulation as above  -2/24: RRT from 5 AM noted after syncopal episode with hypotension and bradycardia at the time.  Currently orthostatics positive Dizzy with standing systolic sitting 130s down to 100 with standing.  Telemetry A-fib in the 120s-received 1 dose of digoxin earlier this morning  -Cardiology notified holding Bumex and diltiazem at this time further recommendations per cardiology service.  Ultrasound carotids ordered.    Concern for acute CHF  -Chest x-ray with pulmonary vascular congestion.  -Patient is on room air, does not require any oxygen supplementation.  Does not appear fluid overloaded on physical exam.  -Holding diuretics in view of orthostasis.  -Cardiology on consult      Other medical conditions  Mitral valve stenosis  Carotid stenosis  Essential hypertension     Prophylaxis  Subcutaneous heparin     CODE STATUS  Full              Coordinated care with providers and counseling re: treatment plan and workup     Shahida Stringer MD    Supplementary Documentation:         **Certification      PHYSICIAN Certification of Need for Inpatient Hospitalization - Initial Certification    Patient will require inpatient services that will reasonably be expected to span two midnight's based on the clinical documentation in H+P.   Based on patients current state of illness, I anticipate that, after discharge, patient will require TBD.

## 2025-02-25 VITALS
HEIGHT: 64 IN | DIASTOLIC BLOOD PRESSURE: 69 MMHG | TEMPERATURE: 98 F | SYSTOLIC BLOOD PRESSURE: 155 MMHG | OXYGEN SATURATION: 96 % | BODY MASS INDEX: 23.08 KG/M2 | WEIGHT: 135.19 LBS | RESPIRATION RATE: 18 BRPM | HEART RATE: 63 BPM

## 2025-02-25 LAB
ANION GAP SERPL CALC-SCNC: 8 MMOL/L (ref 0–18)
BASOPHILS # BLD AUTO: 0.03 X10(3) UL (ref 0–0.2)
BASOPHILS NFR BLD AUTO: 0.4 %
BNP SERPL-MCNC: 169 PG/ML (ref ?–100)
BUN BLD-MCNC: 14 MG/DL (ref 9–23)
BUN/CREAT SERPL: 20.9 (ref 10–20)
CALCIUM BLD-MCNC: 8 MG/DL (ref 8.7–10.4)
CHLORIDE SERPL-SCNC: 107 MMOL/L (ref 98–112)
CO2 SERPL-SCNC: 24 MMOL/L (ref 21–32)
CREAT BLD-MCNC: 0.67 MG/DL
DEPRECATED RDW RBC AUTO: 42.3 FL (ref 35.1–46.3)
EGFRCR SERPLBLD CKD-EPI 2021: 91 ML/MIN/1.73M2 (ref 60–?)
EOSINOPHIL # BLD AUTO: 0.13 X10(3) UL (ref 0–0.7)
EOSINOPHIL NFR BLD AUTO: 1.7 %
ERYTHROCYTE [DISTWIDTH] IN BLOOD BY AUTOMATED COUNT: 15.5 % (ref 11–15)
GLUCOSE BLD-MCNC: 97 MG/DL (ref 70–99)
HCT VFR BLD AUTO: 33.5 %
HGB BLD-MCNC: 10.6 G/DL
HGB BLD-MCNC: 11.3 G/DL
IMM GRANULOCYTES # BLD AUTO: 0.01 X10(3) UL (ref 0–1)
IMM GRANULOCYTES NFR BLD: 0.1 %
LYMPHOCYTES # BLD AUTO: 1.45 X10(3) UL (ref 1–4)
LYMPHOCYTES NFR BLD AUTO: 18.7 %
MCH RBC QN AUTO: 24.3 PG (ref 26–34)
MCHC RBC AUTO-ENTMCNC: 31.6 G/DL (ref 31–37)
MCV RBC AUTO: 76.7 FL
MONOCYTES # BLD AUTO: 0.79 X10(3) UL (ref 0.1–1)
MONOCYTES NFR BLD AUTO: 10.2 %
NEUTROPHILS # BLD AUTO: 5.35 X10 (3) UL (ref 1.5–7.7)
NEUTROPHILS # BLD AUTO: 5.35 X10(3) UL (ref 1.5–7.7)
NEUTROPHILS NFR BLD AUTO: 68.9 %
OSMOLALITY SERPL CALC.SUM OF ELEC: 288 MOSM/KG (ref 275–295)
PLATELET # BLD AUTO: 173 10(3)UL (ref 150–450)
POTASSIUM SERPL-SCNC: 3.6 MMOL/L (ref 3.5–5.1)
RBC # BLD AUTO: 4.37 X10(6)UL
SODIUM SERPL-SCNC: 139 MMOL/L (ref 136–145)
WBC # BLD AUTO: 7.8 X10(3) UL (ref 4–11)

## 2025-02-25 PROCEDURE — 99239 HOSP IP/OBS DSCHRG MGMT >30: CPT | Performed by: INTERNAL MEDICINE

## 2025-02-25 RX ORDER — DILTIAZEM HYDROCHLORIDE 120 MG/1
120 CAPSULE, EXTENDED RELEASE ORAL DAILY
Qty: 30 CAPSULE | Refills: 0 | Status: SHIPPED | OUTPATIENT
Start: 2025-02-26 | End: 2026-02-26

## 2025-02-25 RX ORDER — METOPROLOL SUCCINATE 100 MG/1
100 TABLET, EXTENDED RELEASE ORAL
Qty: 30 TABLET | Refills: 0 | Status: SHIPPED | OUTPATIENT
Start: 2025-02-26 | End: 2025-03-28

## 2025-02-25 NOTE — PLAN OF CARE
Problem: Patient Centered Care  Goal: Patient preferences are identified and integrated in the patient's plan of care  Description: Interventions:  - What would you like us to know as we care for you? From home with son  - Provide timely, complete, and accurate information to patient/family  - Incorporate patient and family knowledge, values, beliefs, and cultural backgrounds into the planning and delivery of care  - Encourage patient/family to participate in care and decision-making at the level they choose  - Honor patient and family perspectives and choices  Outcome: Progressing     Problem: CARDIOVASCULAR - ADULT  Goal: Maintains optimal cardiac output and hemodynamic stability  Description: INTERVENTIONS:  - Monitor vital signs, rhythm, and trends  - Monitor for bleeding, hypotension and signs of decreased cardiac output  - Evaluate effectiveness of vasoactive medications to optimize hemodynamic stability  - Monitor arterial and/or venous puncture sites for bleeding and/or hematoma  - Assess quality of pulses, skin color and temperature  - Assess for signs of decreased coronary artery perfusion - ex. Angina  - Evaluate fluid balance, assess for edema, trend weights  Outcome: Progressing  Goal: Absence of cardiac arrhythmias or at baseline  Description: INTERVENTIONS:  - Continuous cardiac monitoring, monitor vital signs, obtain 12 lead EKG if indicated  - Evaluate effectiveness of antiarrhythmic and heart rate control medications as ordered  - Initiate emergency measures for life threatening arrhythmias  - Monitor electrolytes and administer replacement therapy as ordered  Outcome: Progressing     Problem: SAFETY ADULT - FALL  Goal: Free from fall injury  Description: INTERVENTIONS:  - Assess pt frequently for physical needs  - Identify cognitive and physical deficits and behaviors that affect risk of falls.  - [place] fall precautions as indicated by assessment.  - Educate pt/family on patient safety  including physical limitations  - Instruct pt to call for assistance with activity based on assessment  - Modify environment to reduce risk of injury  - Provide assistive devices as appropriate  - Consider OT/PT consult to assist with strengthening/mobility  - Encourage toileting schedule  Outcome: Progressing     Problem: DISCHARGE PLANNING  Goal: Discharge to home or other facility with appropriate resources  Description: INTERVENTIONS:  - Identify barriers to discharge w/pt and caregiver  - Include patient/family/discharge partner in discharge planning  - Arrange for needed discharge resources and transportation as appropriate  - Identify discharge learning needs (meds, wound care, etc)  - Arrange for interpreters to assist at discharge as needed  - Consider post-discharge preferences of patient/family/discharge partner  - Complete POLST form as appropriate  - Assess patient's ability to be responsible for managing their own health  - Refer to Case Management Department for coordinating discharge planning if the patient needs post-hospital services based on physician/LIP order or complex needs related to functional status, cognitive ability or social support system  Outcome: Progressing     Problem: PAIN - ADULT  Goal: Verbalizes/displays adequate comfort level or patient's stated pain goal  Description: INTERVENTIONS:  - Encourage pt to monitor pain and request assistance  - Assess pain using appropriate pain scale  - Administer analgesics based on type and severity of pain and evaluate response  - Implement non-pharmacological measures as appropriate and evaluate response  - Consider cultural and social influences on pain and pain management  - Manage/alleviate anxiety  - Utilize distraction and/or relaxation techniques  - Monitor for opioid side effects  - Notify MD/LIP if interventions unsuccessful or patient reports new pain  - Anticipate increased pain with activity and pre-medicate as appropriate  Outcome:  Progressing     Problem: Altered Communication/Language Barrier  Goal: Patient/Family is able to understand and participate in their care  Description: Interventions:  - Assess communication ability and preferred communication style  - Implement communication aides and strategies  - Use visual cues when possible  - Listen attentively, be patient, do not interrupt  - Minimize distractions  - Allow time for understanding and response  - Establish method for patient to ask for assistance (call light)  - Provide an  as needed  - Communicate barriers and strategies to overcome with those who interact with patient  Outcome: Progressing     Problem: Patient/Family Goals  Goal: Patient/Family Long Term Goal  Description: Patient's Long Term Goal: discharge from hospital    Interventions:  - monitor vital signs   - monitor appropriate labs  - pain management   - administer medications per order  - follow MD orders  - diagnostics per order  - update and inform patient and family on plan of care  - discharge planning  - See additional Care Plan goals for specific interventions  Outcome: Progressing  Goal: Patient/Family Short Term Goal  Description: Patient's Short Term Goal: control heart rate    Interventions:   - monitor vital signs   - monitor appropriate labs  - pain management   - administer medications per order  - follow MD orders  - diagnostics per order  - update and inform patient and family on plan of care  - See additional Care Plan goals for specific interventions  Outcome: Progressing     Problem: HEMATOLOGIC - ADULT  Goal: Free from bleeding injury  Description: (Example usage: patient with low platelets)  INTERVENTIONS:  - Avoid intramuscular injections, enemas and rectal medication administration  - Ensure safe mobilization of patient  - Hold pressure on venipuncture sites to achieve adequate hemostasis  - Assess for signs and symptoms of internal bleeding  - Monitor lab trends  - Patient is to  report abnormal signs of bleeding to staff  - Avoid use of toothpicks and dental floss  - Use electric shaver for shaving  - Use soft bristle tooth brush  - Limit straining and forceful nose blowing  Outcome: Progressing

## 2025-02-25 NOTE — PLAN OF CARE
Problem: Patient Centered Care  Goal: Patient preferences are identified and integrated in the patient's plan of care  Description: Interventions:  - What would you like us to know as we care for you? From home with son  - Provide timely, complete, and accurate information to patient/family  - Incorporate patient and family knowledge, values, beliefs, and cultural backgrounds into the planning and delivery of care  - Encourage patient/family to participate in care and decision-making at the level they choose  - Honor patient and family perspectives and choices  2/25/2025 1503 by Lesli Lindo RN  Outcome: Adequate for Discharge  2/25/2025 1304 by Lesli Lindo RN  Outcome: Progressing     Problem: CARDIOVASCULAR - ADULT  Goal: Maintains optimal cardiac output and hemodynamic stability  Description: INTERVENTIONS:  - Monitor vital signs, rhythm, and trends  - Monitor for bleeding, hypotension and signs of decreased cardiac output  - Evaluate effectiveness of vasoactive medications to optimize hemodynamic stability  - Monitor arterial and/or venous puncture sites for bleeding and/or hematoma  - Assess quality of pulses, skin color and temperature  - Assess for signs of decreased coronary artery perfusion - ex. Angina  - Evaluate fluid balance, assess for edema, trend weights  2/25/2025 1503 by Lesli Lnido RN  Outcome: Adequate for Discharge  2/25/2025 1304 by Lesli Lindo RN  Outcome: Progressing  Goal: Absence of cardiac arrhythmias or at baseline  Description: INTERVENTIONS:  - Continuous cardiac monitoring, monitor vital signs, obtain 12 lead EKG if indicated  - Evaluate effectiveness of antiarrhythmic and heart rate control medications as ordered  - Initiate emergency measures for life threatening arrhythmias  - Monitor electrolytes and administer replacement therapy as ordered  2/25/2025 1503 by Lesli Lindo RN  Outcome: Adequate for Discharge  2/25/2025 1304 by Lesli Lindo  RN  Outcome: Progressing     Problem: SAFETY ADULT - FALL  Goal: Free from fall injury  Description: INTERVENTIONS:  - Assess pt frequently for physical needs  - Identify cognitive and physical deficits and behaviors that affect risk of falls.  - [place] fall precautions as indicated by assessment.  - Educate pt/family on patient safety including physical limitations  - Instruct pt to call for assistance with activity based on assessment  - Modify environment to reduce risk of injury  - Provide assistive devices as appropriate  - Consider OT/PT consult to assist with strengthening/mobility  - Encourage toileting schedule  2/25/2025 1503 by Lesli Lindo RN  Outcome: Adequate for Discharge  2/25/2025 1304 by Lesli Lindo RN  Outcome: Progressing     Problem: DISCHARGE PLANNING  Goal: Discharge to home or other facility with appropriate resources  Description: INTERVENTIONS:  - Identify barriers to discharge w/pt and caregiver  - Include patient/family/discharge partner in discharge planning  - Arrange for needed discharge resources and transportation as appropriate  - Identify discharge learning needs (meds, wound care, etc)  - Arrange for interpreters to assist at discharge as needed  - Consider post-discharge preferences of patient/family/discharge partner  - Complete POLST form as appropriate  - Assess patient's ability to be responsible for managing their own health  - Refer to Case Management Department for coordinating discharge planning if the patient needs post-hospital services based on physician/LIP order or complex needs related to functional status, cognitive ability or social support system  2/25/2025 1503 by Lesli Lindo, RN  Outcome: Adequate for Discharge  2/25/2025 1304 by Lesli Lindo RN  Outcome: Progressing     Problem: METABOLIC/FLUID AND ELECTROLYTES - ADULT  Goal: Electrolytes maintained within normal limits  Description: INTERVENTIONS:  - Monitor labs and rhythm and assess  patient for signs and symptoms of electrolyte imbalances  - Administer electrolyte replacement as ordered  - Monitor response to electrolyte replacements, including rhythm and repeat lab results as appropriate  - Fluid restriction as ordered  - Instruct patient on fluid and nutrition restrictions as appropriate  2/25/2025 1503 by Lesli Lindo RN  Outcome: Adequate for Discharge  2/25/2025 1304 by Lesli Lindo RN  Outcome: Progressing     Problem: Patient/Family Goals  Goal: Patient/Family Long Term Goal  Description: Patient's Long Term Goal: discharge from hospital    Interventions:  - monitor vital signs   - monitor appropriate labs  - pain management   - administer medications per order  - follow MD orders  - diagnostics per order  - update and inform patient and family on plan of care  - discharge planning  - See additional Care Plan goals for specific interventions  2/25/2025 1503 by Lesli Lindo RN  Outcome: Adequate for Discharge  2/25/2025 1304 by Lesli Lindo RN  Outcome: Progressing  Goal: Patient/Family Short Term Goal  Description: Patient's Short Term Goal: control heart rate    Interventions:   - monitor vital signs   - monitor appropriate labs  - pain management   - administer medications per order  - follow MD orders  - diagnostics per order  - update and inform patient and family on plan of care  - See additional Care Plan goals for specific interventions  2/25/2025 1503 by Lesli Lindo RN  Outcome: Adequate for Discharge  2/25/2025 1304 by Lesli Lindo RN  Outcome: Progressing     Problem: SKIN/TISSUE INTEGRITY - ADULT  Goal: Skin integrity remains intact  Description: INTERVENTIONS  - Assess and document risk factors for pressure ulcer development  - Assess and document skin integrity  - Monitor for areas of redness and/or skin breakdown  - Initiate interventions, skin care algorithm/standards of care as needed  2/25/2025 1503 by Lesli Lindo RN  Outcome:  Adequate for Discharge  2/25/2025 1304 by Lesli Lindo RN  Outcome: Progressing     Problem: HEMATOLOGIC - ADULT  Goal: Free from bleeding injury  Description: (Example usage: patient with low platelets)  INTERVENTIONS:  - Avoid intramuscular injections, enemas and rectal medication administration  - Ensure safe mobilization of patient  - Hold pressure on venipuncture sites to achieve adequate hemostasis  - Assess for signs and symptoms of internal bleeding  - Monitor lab trends  - Patient is to report abnormal signs of bleeding to staff  - Avoid use of toothpicks and dental floss  - Use electric shaver for shaving  - Use soft bristle tooth brush  - Limit straining and forceful nose blowing  2/25/2025 1503 by Lesli Lindo RN  Outcome: Adequate for Discharge  2/25/2025 1304 by Lesli Lindo RN  Outcome: Progressing     Problem: PAIN - ADULT  Goal: Verbalizes/displays adequate comfort level or patient's stated pain goal  Description: INTERVENTIONS:  - Encourage pt to monitor pain and request assistance  - Assess pain using appropriate pain scale  - Administer analgesics based on type and severity of pain and evaluate response  - Implement non-pharmacological measures as appropriate and evaluate response  - Consider cultural and social influences on pain and pain management  - Manage/alleviate anxiety  - Utilize distraction and/or relaxation techniques  - Monitor for opioid side effects  - Notify MD/LIP if interventions unsuccessful or patient reports new pain  - Anticipate increased pain with activity and pre-medicate as appropriate  2/25/2025 1503 by Lesli Lindo RN  Outcome: Adequate for Discharge  2/25/2025 1304 by Lesli Lindo RN  Outcome: Progressing     Problem: Altered Communication/Language Barrier  Goal: Patient/Family is able to understand and participate in their care  Description: Interventions:  - Assess communication ability and preferred communication style  - Implement  communication aides and strategies  - Use visual cues when possible  - Listen attentively, be patient, do not interrupt  - Minimize distractions  - Allow time for understanding and response  - Establish method for patient to ask for assistance (call light)  - Provide an  as needed  - Communicate barriers and strategies to overcome with those who interact with patient  2/25/2025 1503 by Lesli Lindo, RN  Outcome: Adequate for Discharge  2/25/2025 1304 by Lesli Lindo, RN  Outcome: Progressing     Patient medically clear for discharge. Intravenous fluids completed. This RN completed discharge education and instructions with patient and family. No questions at this time. PIV and telemetry monitor removed.

## 2025-02-25 NOTE — DISCHARGE SUMMARY
Discharge Summary     Narcisa Teague Patient Status:  Inpatient    1950 MRN [id number]   Location [facility] 3W/SW Attending Shahida Stringer MD   Hosp Day # 3 PCP Jesenia Smith MD     Date of Admission: 2025  Date of Discharge: ***  Discharge Disposition: Home or Self Care    Discharge Diagnosis: ***    History of Present Illness: ***  {H&P Notes :8307}          Brief Synopsis: ***  Atrial fibrillation with RVR, new onset.   Hemodynamically stable. [id number] 4 (Age/Sex) not on any anticoagulation  -Patient received Cardizem bolus and started on Cardizem drip in the ED with improvement of heart rate  -Cardiology consult   -Monitor on telemetry  -Monitor electrolytes  -Anticoagulation as above  -: RRT from 5 AM noted after syncopal episode with hypotension and bradycardia at the time.  Currently orthostatics positive Dizzy with standing systolic sitting 130s down to 100 with standing.  Telemetry A-fib in the 120s-received 1 dose of digoxin earlier this morning  -Cardiology notified holding Bumex and diltiazem at this time further recommendations per cardiology service.  Ultrasound carotids ordered.     Concern for acute CHF  -Chest x-ray with pulmonary vascular congestion.  -Patient is on room air, does not require any oxygen supplementation.  Does not appear fluid overloaded on physical exam.  -Holding diuretics in view of orthostasis.  -Cardiology on consult     Other medical conditions  Mitral valve stenosis  Carotid stenosis  Essential hypertension     Lace+ Score: 41  59-90 High Risk  29-58 Medium Risk  0-28   Low Risk       TCM Follow-Up Recommendation:  LACE < 29: Low Risk of readmission after discharge from the hospital. No TCM follow-up needed.          Consultants:  cardiology    Discharge Medication List:     Discharge Medications        START taking these medications        Instructions Prescription details   apixaban 5 MG Tabs  Commonly known as: Eliquis      Take 1  tablet (5 mg total) by mouth 2 (two) times daily.   Stop taking on: March 27, 2025  Quantity: 60 tablet  Refills: 0     dilTIAZem HCl ER Beads 120 MG Cp24  Commonly known as: TIAZAC  Start taking on: February 26, 2025      Take 1 capsule (120 mg total) by mouth daily.   Quantity: 30 capsule  Refills: 0            CHANGE how you take these medications        Instructions Prescription details   atorvastatin 40 MG Tabs  Commonly known as: Lipitor  What changed:   how much to take  how to take this  when to take this      Take 1 tablet every day by oral route.   Quantity: 90 tablet  Refills: 4     metoprolol succinate  MG Tb24  Commonly known as: Toprol XL  Start taking on: February 26, 2025  What changed:   how much to take  when to take this      Take 1 tablet (100 mg total) by mouth Daily Beta Blocker.   Stop taking on: March 28, 2025  Quantity: 30 tablet  Refills: 0            STOP taking these medications      amLODIPine 2.5 MG Tabs  Commonly known as: Norvasc        aspirin 81 MG Chew        bumetanide 1 MG Tabs  Commonly known as: Bumex        losartan 50 MG Tabs  Commonly known as: Cozaar                  Where to Get Your Medications        These medications were sent to [id number] - [place], [place] - [address] [phone], [phone]  [address] [place] [address]      Phone: [phone]   apixaban 5 MG Tabs  dilTIAZem HCl ER Beads 120 MG Cp24  metoprolol succinate  MG Tb24         Follow-up appointment:   No follow-up provider specified.  Appointments for Next 30 Days 2/25/2025 - 3/27/2025      None            Supplementary Documentation:   [place] reviewed[place]    Vital signs:  Temp:  [97.2 °F (36.2 °C)-97.9 °F (36.6 °C)] 97.9 °F (36.6 °C)  Pulse:  [57-73] 63  Resp:  [16-18] 18  BP: (114-155)/(63-82) 155/69  SpO2:  [96 %] 96 %    Physical Exam:    General:  NAD  Cardiovascular:  S1,  S2    -----------------------------------------------------------------------------------------------  PATIENT DISCHARGE INSTRUCTIONS: See electronic chart    Tip: Documentation requirements: For split shared discharge, BOTH providers need to document specific floor, unit, and time spent on the discharge.  The note needs to be signed by the provider with > 50% of time and bill under their NPI.   Time spent:  45min         Shahida Stringer MD

## 2025-02-25 NOTE — PROGRESS NOTES
02/25/25 1142 02/25/25 1143 02/25/25 1147   Vital Signs   Pulse 57 62 63   Heart Rate Source Monitor Monitor Monitor   /63 134/77 155/69   MAP (mmHg) 78 95 93   BP Location Right arm Right arm Right arm   BP Method Automatic Automatic Automatic   Patient Position Lying Sitting Standing     Patient denies dizziness after orthostatics were taken. Standing blood pressure needed to be taken twice due to dizziness and needing to sit down.

## 2025-02-25 NOTE — CM/SW NOTE
Received RN message for Eliquis coverage. Pt has DC order.  SW confirmed Rx sent via e-script to pt's pharmacy.     SW/KENYETTA contacted pt's [facility] via phone #: [phone] and spoke to Fátima. Per Fátima, pt's OOP cost is $47 /month.     Free 30 day and/or $10 co-pay coupons have been added to pt's chart.       MS TaylorW, LSW [id number]

## 2025-02-25 NOTE — PROGRESS NOTES
Patient seen in follow-up.  Orthostatic vitals were negative.  No reported chest pain or shortness of breath.  No dizziness today.  This morning heart rate were in the 50s and then went up over 60 and diltiazem was administered.  Remains out of atrial fibrillation.    Vitals:    02/25/25 1147   BP: 155/69   Pulse: 63   Resp:    Temp:        Intake/Output Summary (Last 24 hours) at 2/25/2025 1356  Last data filed at 2/25/2025 1325  Gross per 24 hour   Intake 2517.91 ml   Output 550 ml   Net 1967.91 ml     Wt Readings from Last 1 Encounters:   02/25/25 135 lb 3.2 oz (61.3 kg)        General: No acute distress.  Neck: Jugular venous pulsations not seen.  Lungs: Clear to auscultation.  Heart: Normal rate. No murmurs.  Abdomen: Soft. Non tender  Extremities: No edema.  Neurological: Alert. No focal deficits.  Psychiatric: Appropriate mood and affect.  Current Facility-Administered Medications   Medication Dose Route Frequency    sodium chloride 0.9% infusion   Intravenous Continuous    metoprolol succinate ER (Toprol XL) 24 hr tab 100 mg  100 mg Oral Daily Beta Blocker    apixaban (Eliquis) tab 5 mg  5 mg Oral BID    dilTIAZem ER (Dilacor XR) 24 hr cap 120 mg  120 mg Oral Daily    atorvastatin (Lipitor) tab 40 mg  40 mg Oral Nightly    ondansetron (Zofran) 4 MG/2ML injection 4 mg  4 mg Intravenous Q6H PRN    polyethylene glycol (PEG 3350) (Miralax) 17 g oral packet 17 g  17 g Oral Daily PRN    sennosides (Senokot) tab 17.2 mg  17.2 mg Oral Nightly PRN    bisacodyl (Dulcolax) 10 MG rectal suppository 10 mg  10 mg Rectal Daily PRN    fleet enema (Fleet) rectal enema 133 mL  1 enema Rectal Once PRN    acetaminophen (Tylenol Extra Strength) tab 500 mg  500 mg Oral Q4H PRN     Medications Prior to Admission   Medication Sig    atorvastatin 40 MG Oral Tab Take 1 tablet every day by oral route. (Patient taking differently: Take 1 tablet (40 mg total) by mouth daily. Take 1 tablet every day by oral route.)    metoprolol  succinate  MG Oral Tablet 24 Hr Take 0.5 tablets (50 mg total) by mouth daily.    amLODIPine 2.5 MG Oral Tab Take 1 tablet (2.5 mg total) by mouth daily.    losartan 50 MG Oral Tab Take 1 tablet (50 mg total) by mouth daily.    aspirin 81 MG Oral Chew Tab aspirin 81 mg chewable tablet   Chew 1 tablet every day by oral route.    bumetanide 1 MG Oral Tab Take 1 tablet (1 mg total) by mouth daily. (Patient not taking: Reported on 2/22/2025)     US CAROTID DOPPLER BILAT - DIAG IMG (CPT=93880)    Result Date: 2/24/2025  CONCLUSION:  1. Mild atherosclerosis of the carotid systems without resultant hemodynamically significant stenosis or occlusion.   2. Antegrade flow is demonstrated in the vertebral arteries bilaterally.    Dictated by (CST): Chato Montana MD on 2/24/2025 at 6:37 PM     Finalized by (CST): Chato Montana MD on 2/24/2025 at 6:39 PM            Lab Results   Component Value Date    WBC 7.8 02/25/2025    HGB 10.6 02/25/2025    HCT 33.5 02/25/2025    .0 02/25/2025    CREATSERUM 0.67 02/25/2025    BUN 14 02/25/2025     02/25/2025    K 3.6 02/25/2025     02/25/2025    CO2 24.0 02/25/2025    GLU 97 02/25/2025    CA 8.0 02/25/2025   Imaging:  EKG: Atrial fibrillation with rapid ventricular response   Abnormal ECG   ECHO: (Prelim)  LVH with LV EF 55%/ mod LAE/ 2+ MR and 1+ AI and 2+ TR with Mild Pulmonary HTN      IMPRESSION:  Assessment / Plan  1. New onset atrial fibrillation with rapid ventricular response -  Started on cardizem drip. Will resume metoprolol succinate 100 mg daily. Previously had short episodes of atrial tachycardia but no atrial fibrillation. Elevated CHADS2-VASc score, will start on Eliquis 5 mg twice daily and stop aspirin.     I48.91 - Unspecified atrial fibrillation     2. Coronary artery disease -  Continue statin therapy.  I25.10 - Atherosclerotic heart disease of native coronary artery without angina pectoris     3. Hypertension -  Blood pressure has been on  the low side. She describes some orthostatic symptoms. Will remove some of her blood pressure medications. If necessary for rate control, may have to add oral diltiazem.     I10 - Essential (primary) hypertension     4. Acute on chronic diastolic heart failure -  Resume long-term bumetanide daily.     I50.32 - Chronic diastolic (congestive) heart failure     5. History of mitral regurgitation and mitral stenosis -  Appears to be volume related with gradients dynamically decreasing as volume status improved and mitral regurgitation improved. This would not be a contraindication for using DOAC for anticoagulation.  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 Near syncope -  She has previously had dizziness. Suspect she may have orthostatic hypotension. Will liberalize her blood pressure targets.  orthostatic blood pressures  s/o orthostatic hypotension, associated with syncope      R55 - Syncope and collapse       PLAN:  At this point do not resume diuretics on discharge due to orthostatic/vasovagal episode.  Clinically not overloaded currently.  Patient will need ongoing Eliquis.  Added diltiazem for rate control.  Advised that this should be only taken if the heart rate is over 60.  Continue to monitor blood pressure and heart rate at home and correlate with any symptoms of dizziness.  I will check another hemoglobin level today.  If the hemoglobin is above 10 patient can discharge if below 10 I would watch another day to make sure there is no drastic decline in hemoglobin.  Patient does have a home blood pressure monitor.

## 2025-02-26 ENCOUNTER — PATIENT OUTREACH (OUTPATIENT)
Dept: CASE MANAGEMENT | Age: 75
End: 2025-02-26

## 2025-02-26 NOTE — PROGRESS NOTES
NCM attempted to reach the patient to complete a transitions of care call. Phone rings and there is no option to leave a message to call back. NCM to follow up at another time.

## 2025-02-26 NOTE — PROGRESS NOTES
NCM attempted to reach the patient to complete a transitions of care call. Left message to call back. [facility] provided direct contact info at [phone].

## 2025-02-28 ENCOUNTER — TELEPHONE (OUTPATIENT)
Dept: CASE MANAGEMENT | Age: 75
End: 2025-02-28

## 2025-02-28 DIAGNOSIS — I05.0 RHEUMATIC MITRAL STENOSIS: ICD-10-CM

## 2025-02-28 DIAGNOSIS — I48.91 ATRIAL FIBRILLATION WITH RAPID VENTRICULAR RESPONSE (HCC): Primary | ICD-10-CM

## 2025-02-28 NOTE — TELEPHONE ENCOUNTER
Dr. Smith,     Daughter requesting referral to Dr. Do for appointment 3/6/25.     Pended referral please review diagnosis and sign off if you agree.    Thank you.  Cynthia Hopson  [facility]

## 2025-02-28 NOTE — PROGRESS NOTES
NCM attempted to reach the patient to complete a transitions of care call. Left message to call back. [facility] provided direct contact info at [phone]. Unable to reach the patient after several attempts. [facility] closing encounter.

## 2025-03-06 ENCOUNTER — OFFICE VISIT (OUTPATIENT)
Dept: FAMILY MEDICINE CLINIC | Facility: CLINIC | Age: 75
End: 2025-03-06
Payer: MEDICARE

## 2025-03-06 VITALS
BODY MASS INDEX: 22.77 KG/M2 | SYSTOLIC BLOOD PRESSURE: 132 MMHG | DIASTOLIC BLOOD PRESSURE: 72 MMHG | HEIGHT: 64 IN | HEART RATE: 54 BPM | WEIGHT: 133.38 LBS

## 2025-03-06 DIAGNOSIS — R35.0 URINE FREQUENCY: ICD-10-CM

## 2025-03-06 DIAGNOSIS — I48.91 ATRIAL FIBRILLATION WITH RAPID VENTRICULAR RESPONSE (HCC): Primary | ICD-10-CM

## 2025-03-06 LAB
APPEARANCE: CLEAR
BILIRUBIN: NEGATIVE
GLUCOSE (URINE DIPSTICK): NEGATIVE MG/DL
KETONES (URINE DIPSTICK): NEGATIVE MG/DL
MULTISTIX LOT#: ABNORMAL NUMERIC
NITRITE, URINE: POSITIVE
PH, URINE: 7 (ref 4.5–8)
PROTEIN (URINE DIPSTICK): NEGATIVE MG/DL
SPECIFIC GRAVITY: 1.01 (ref 1–1.03)
URINE-COLOR: YELLOW
UROBILINOGEN,SEMI-QN: 0.2 MG/DL (ref 0–1.9)

## 2025-03-06 PROCEDURE — 3008F BODY MASS INDEX DOCD: CPT | Performed by: FAMILY MEDICINE

## 2025-03-06 PROCEDURE — 1126F AMNT PAIN NOTED NONE PRSNT: CPT | Performed by: FAMILY MEDICINE

## 2025-03-06 PROCEDURE — 3075F SYST BP GE 130 - 139MM HG: CPT | Performed by: FAMILY MEDICINE

## 2025-03-06 PROCEDURE — 3078F DIAST BP <80 MM HG: CPT | Performed by: FAMILY MEDICINE

## 2025-03-06 PROCEDURE — 1160F RVW MEDS BY RX/DR IN RCRD: CPT | Performed by: FAMILY MEDICINE

## 2025-03-06 PROCEDURE — 81003 URINALYSIS AUTO W/O SCOPE: CPT | Performed by: FAMILY MEDICINE

## 2025-03-06 PROCEDURE — 99214 OFFICE O/P EST MOD 30 MIN: CPT | Performed by: FAMILY MEDICINE

## 2025-03-06 PROCEDURE — 1111F DSCHRG MED/CURRENT MED MERGE: CPT | Performed by: FAMILY MEDICINE

## 2025-03-06 PROCEDURE — 1159F MED LIST DOCD IN RCRD: CPT | Performed by: FAMILY MEDICINE

## 2025-03-06 RX ORDER — CEPHALEXIN 500 MG/1
500 CAPSULE ORAL 2 TIMES DAILY
Qty: 14 CAPSULE | Refills: 0 | Status: SHIPPED | OUTPATIENT
Start: 2025-03-06

## 2025-03-06 RX ORDER — ESTRADIOL 0.1 MG/G
1 CREAM VAGINAL DAILY
Qty: 42 G | Refills: 4 | Status: SHIPPED | OUTPATIENT
Start: 2025-03-06

## 2025-03-06 NOTE — PROGRESS NOTES
Narcisa Teague is a 74 year old female.   Chief Complaint   Patient presents with    Hospital F/U     2/22/25 Afib     HPI:   Hospitalized 2/22 for new onset atrial fib.   \"Atrial fibrillation with RVR, new onset.   Hemodynamically stable. [id number] 4 (Age/Sex) not on any anticoagulation  -Patient received Cardizem bolus and started on Cardizem drip in the ED with improvement of heart rate  -Cardiology consult   -Monitor on telemetry  -Monitor electrolytes  -Anticoagulation as above  -Rate/rhythm control with Cardizem  -Echo per cardiology     Concern for acute CHF  -Chest x-ray with pulmonary vascular congestion.  -Patient is on room air, does not require any oxygen supplementation.  Does not appear fluid overloaded on physical exam.  -Noted to have a low BP after initiation of Cardizem. Will hold off on diuretics  -Cardiology on consult     Other medical conditions  Mitral valve stenosis  Carotid stenosis  Essential hypertension     Prophylaxis  Subcutaneous heparin\"     Here for follow up. [facility]. Dr. Dean Torres.   Reports in am and PM BP is elevated but improves mid day.   Urinating all night long.   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, right    R thenar mass    Essential hypertension    High blood pressure    PONV (postoperative nausea and vomiting)      Social History:  Social History     Socioeconomic History    Marital status:    Tobacco Use    Smoking status: Never    Smokeless tobacco: Never   Vaping Use    Vaping status: Never Used   Substance and Sexual Activity    Alcohol use: No    Drug use: Never     Social Drivers of Health     Food Insecurity: No Food Insecurity (2/22/2025)    NCSS - Food Insecurity     Worried About Running Out of Food in the Last Year: No     Ran Out of Food in the Last Year: No   Transportation Needs: No Transportation Needs (2/22/2025)    NCSS - Transportation     Lack of  Transportation: No   Housing Stability: Not At Risk (2/22/2025)    NCSS - Housing/Utilities     Has Housing: Yes     Worried About Losing Housing: No     Unable to Get Utilities: No        REVIEW OF SYSTEMS:   Review of Systems   See HPI     EXAM:   /79   Pulse 54   Ht 5' 4\" (1.626 m)   Wt 133 lb 6.4 oz (60.5 kg)   BMI 22.90 kg/m²   /72   Pulse 54   Ht 5' 4\" (1.626 m)   Wt 133 lb 6.4 oz (60.5 kg)   BMI 22.90 kg/m²     GENERAL: well developed, well nourished,in no apparent distress  LUNGS: clear to auscultation  CARDIO: RRR without murmur  EXTREMITIES: no cyanosis, clubbing or edema    ASSESSMENT AND PLAN:   1. Atrial fibrillation with rapid ventricular response (HCC)  Per cards. Stable.     2. Urine frequency  Positive UTI on UA. Tx with keflex BID and adding estrogen cream.   - POC Urinalysis, Automated Dip without microscopy (PCA and EMMG ONLY) [55898]  - Urine Culture, Routine [E]; Future  - Urine Culture, Routine [E]    The patient indicates understanding of these issues and agrees to the plan.      Jesenia Smith MD  3/6/2025  10:46 AM         [1]   Current Outpatient Medications on File Prior to Visit   Medication Sig Dispense Refill    apixaban 5 MG Oral Tab Take 1 tablet (5 mg total) by mouth 2 (two) times daily. 60 tablet 0    dilTIAZem HCl ER Beads 120 MG Oral Capsule SR 24 Hr Take 1 capsule (120 mg total) by mouth daily. 30 capsule 0    metoprolol succinate  MG Oral Tablet 24 Hr Take 1 tablet (100 mg total) by mouth Daily Beta Blocker. 30 tablet 0    atorvastatin 40 MG Oral Tab Take 1 tablet every day by oral route. (Patient taking differently: Take 0.5 tablets (20 mg total) by mouth daily. Take 1 tablet every day by oral route.) 90 tablet 4     No current facility-administered medications on file prior to visit.

## 2025-03-18 ENCOUNTER — TELEPHONE (OUTPATIENT)
Dept: FAMILY MEDICINE CLINIC | Facility: CLINIC | Age: 75
End: 2025-03-18

## 2025-03-18 DIAGNOSIS — D64.9 ANEMIA, UNSPECIFIED TYPE: Primary | ICD-10-CM

## 2025-03-18 DIAGNOSIS — R35.0 URINE FREQUENCY: ICD-10-CM

## 2025-03-18 NOTE — TELEPHONE ENCOUNTER
Called patient verified full name and . Informed patient of urine test. Patient verbalized understanding and did not have any further questions.

## 2025-03-18 NOTE — TELEPHONE ENCOUNTER
Message from cards that Hgb is low. Was low on release from hospital and has continued at 10.7 with lab from cards.   Pt to go for iron levels and check stool cards.

## 2025-03-18 NOTE — TELEPHONE ENCOUNTER
Called patient verified full name and . Informed patient of message below. Patient verbalized understanding and did not have any further questions.    Patient reports continued nocturia. Confirmed patient completed course of antibiotics. Please advise.

## 2025-03-19 ENCOUNTER — LAB ENCOUNTER (OUTPATIENT)
Dept: LAB | Age: 75
End: 2025-03-19
Attending: FAMILY MEDICINE
Payer: MEDICARE

## 2025-03-19 DIAGNOSIS — D64.9 ANEMIA, UNSPECIFIED TYPE: ICD-10-CM

## 2025-03-19 DIAGNOSIS — R35.0 URINE FREQUENCY: ICD-10-CM

## 2025-03-19 LAB
BILIRUB UR QL: NEGATIVE
CLARITY UR: CLEAR
GLUCOSE UR-MCNC: NORMAL MG/DL
HGB UR QL STRIP.AUTO: NEGATIVE
IRON SATN MFR SERPL: 9 %
IRON SERPL-MCNC: 33 UG/DL
KETONES UR-MCNC: NEGATIVE MG/DL
LEUKOCYTE ESTERASE UR QL STRIP.AUTO: NEGATIVE
NITRITE UR QL STRIP.AUTO: NEGATIVE
PH UR: 7 [PH] (ref 5–8)
PROT UR-MCNC: NEGATIVE MG/DL
SP GR UR STRIP: 1.01 (ref 1–1.03)
TOTAL IRON BINDING CAPACITY: 366 UG/DL (ref 250–425)
TRANSFERRIN SERPL-MCNC: 293 MG/DL (ref 250–380)
UROBILINOGEN UR STRIP-ACNC: NORMAL

## 2025-03-19 PROCEDURE — 36415 COLL VENOUS BLD VENIPUNCTURE: CPT

## 2025-03-19 PROCEDURE — 81003 URINALYSIS AUTO W/O SCOPE: CPT

## 2025-03-19 PROCEDURE — 84466 ASSAY OF TRANSFERRIN: CPT

## 2025-03-19 PROCEDURE — 83540 ASSAY OF IRON: CPT

## 2025-03-20 ENCOUNTER — LAB ENCOUNTER (OUTPATIENT)
Dept: LAB | Age: 75
End: 2025-03-20
Attending: FAMILY MEDICINE
Payer: MEDICARE

## 2025-03-20 DIAGNOSIS — D64.9 ANEMIA, UNSPECIFIED TYPE: ICD-10-CM

## 2025-03-20 LAB — HEMOCCULT STL QL: NEGATIVE

## 2025-03-20 PROCEDURE — 82274 ASSAY TEST FOR BLOOD FECAL: CPT

## 2025-03-22 DIAGNOSIS — D50.9 IRON DEFICIENCY ANEMIA, UNSPECIFIED IRON DEFICIENCY ANEMIA TYPE: Primary | ICD-10-CM

## 2025-03-22 NOTE — PROGRESS NOTES
Urine is normal and iron levels are low - see other results - patient to see hematologist for option of iron infusions vs oral iron supplements. - Dr. Smith

## 2025-03-22 NOTE — PROGRESS NOTES
Stool test was negative for blood. I would like patient to see hematologist to help with cause for continued anemia. Please give phone number for Dr. Moore's office but first available is fine - may not be Dr. Moore. - Dr. Smith

## 2025-03-25 ENCOUNTER — TELEPHONE (OUTPATIENT)
Age: 75
End: 2025-03-25

## 2025-03-25 NOTE — TELEPHONE ENCOUNTER
New consult  Narcisa Teague 12/29/1950  Referred by Dr. Jesenia Smith [phone]  Referred to hematologist  Iron deficiency anemia, unspecified iron deficiency anemia type     Please call patient's daughter Priya to schedule.

## 2025-03-27 NOTE — TELEPHONE ENCOUNTER
Patient was in hospital 2/22/25    All medications were last ordered by Dr. Shahida Stringer, Hospitalist    Patient has seen Dr. Dean Torres [facility].    Last office visit: 4/20/2023.    Medications never prescribed by Dr. Smith before

## 2025-03-27 NOTE — TELEPHONE ENCOUNTER
Refill request     Current Outpatient Medications   Medication Sig Dispense Refill                  apixaban 5 MG Oral Tab Take 1 tablet (5 mg total) by mouth 2 (two) times daily. 60 tablet 0    dilTIAZem HCl ER Beads 120 MG Oral Capsule SR 24 Hr Take 1 capsule (120 mg total) by mouth daily. 30 capsule 0    metoprolol succinate  MG Oral Tablet 24 Hr Take 1 tablet (100 mg total) by mouth Daily Beta Blocker. 30 tablet 0

## 2025-04-01 RX ORDER — METOPROLOL SUCCINATE 100 MG/1
100 TABLET, EXTENDED RELEASE ORAL
Qty: 90 TABLET | Refills: 0 | OUTPATIENT
Start: 2025-04-01

## 2025-04-01 RX ORDER — DILTIAZEM HYDROCHLORIDE 120 MG/1
120 CAPSULE, EXTENDED RELEASE ORAL DAILY
Qty: 90 CAPSULE | Refills: 0 | OUTPATIENT
Start: 2025-04-01

## 2025-04-08 ENCOUNTER — LAB ENCOUNTER (OUTPATIENT)
Dept: LAB | Age: 75
End: 2025-04-08
Attending: STUDENT IN AN ORGANIZED HEALTH CARE EDUCATION/TRAINING PROGRAM
Payer: MEDICARE

## 2025-04-08 ENCOUNTER — OFFICE VISIT (OUTPATIENT)
Age: 75
End: 2025-04-08
Attending: STUDENT IN AN ORGANIZED HEALTH CARE EDUCATION/TRAINING PROGRAM
Payer: MEDICARE

## 2025-04-08 VITALS
TEMPERATURE: 98 F | DIASTOLIC BLOOD PRESSURE: 74 MMHG | HEIGHT: 64 IN | BODY MASS INDEX: 22.88 KG/M2 | RESPIRATION RATE: 16 BRPM | WEIGHT: 134 LBS | HEART RATE: 51 BPM | SYSTOLIC BLOOD PRESSURE: 156 MMHG | OXYGEN SATURATION: 100 %

## 2025-04-08 DIAGNOSIS — D64.9 CHRONIC ANEMIA: Primary | ICD-10-CM

## 2025-04-08 DIAGNOSIS — R71.8 RBC MICROCYTOSIS: ICD-10-CM

## 2025-04-08 DIAGNOSIS — E61.1 IRON DEFICIENCY: ICD-10-CM

## 2025-04-08 DIAGNOSIS — D64.9 CHRONIC ANEMIA: ICD-10-CM

## 2025-04-08 LAB
BASOPHILS # BLD AUTO: 0.04 X10(3) UL (ref 0–0.2)
BASOPHILS NFR BLD AUTO: 0.5 %
DEPRECATED HBV CORE AB SER IA-ACNC: 15 NG/ML
DEPRECATED RDW RBC AUTO: 46.3 FL (ref 35.1–46.3)
EOSINOPHIL # BLD AUTO: 0.13 X10(3) UL (ref 0–0.7)
EOSINOPHIL NFR BLD AUTO: 1.8 %
ERYTHROCYTE [DISTWIDTH] IN BLOOD BY AUTOMATED COUNT: 16.4 % (ref 11–15)
FOLATE SERPL-MCNC: 21.7 NG/ML (ref 5.4–?)
HAPTOGLOB SERPL-MCNC: 139 MG/DL (ref 30–200)
HCT VFR BLD AUTO: 36.3 %
HGB BLD-MCNC: 11.4 G/DL
HGB RETIC QN AUTO: 25.3 PG (ref 28.2–36.6)
IMM GRANULOCYTES # BLD AUTO: 0.03 X10(3) UL (ref 0–1)
IMM GRANULOCYTES NFR BLD: 0.4 %
IMM RETICS NFR: 0.09 RATIO (ref 0.1–0.3)
LDH SERPL L TO P-CCNC: 230 U/L
LYMPHOCYTES # BLD AUTO: 1.93 X10(3) UL (ref 1–4)
LYMPHOCYTES NFR BLD AUTO: 26.3 %
MCH RBC QN AUTO: 24.7 PG (ref 26–34)
MCHC RBC AUTO-ENTMCNC: 31.4 G/DL (ref 31–37)
MCV RBC AUTO: 78.6 FL
MONOCYTES # BLD AUTO: 0.7 X10(3) UL (ref 0.1–1)
MONOCYTES NFR BLD AUTO: 9.5 %
NEUTROPHILS # BLD AUTO: 4.51 X10 (3) UL (ref 1.5–7.7)
NEUTROPHILS # BLD AUTO: 4.51 X10(3) UL (ref 1.5–7.7)
NEUTROPHILS NFR BLD AUTO: 61.5 %
PLATELET # BLD AUTO: 203 10(3)UL (ref 150–450)
RBC # BLD AUTO: 4.62 X10(6)UL
RETICS # AUTO: 45.7 X10(3) UL (ref 22.5–147.5)
RETICS/RBC NFR AUTO: 1 %
WBC # BLD AUTO: 7.3 X10(3) UL (ref 4–11)

## 2025-04-08 PROCEDURE — 36415 COLL VENOUS BLD VENIPUNCTURE: CPT

## 2025-04-08 PROCEDURE — 85025 COMPLETE CBC W/AUTO DIFF WBC: CPT

## 2025-04-08 PROCEDURE — 82746 ASSAY OF FOLIC ACID SERUM: CPT

## 2025-04-08 PROCEDURE — 82728 ASSAY OF FERRITIN: CPT

## 2025-04-08 PROCEDURE — 83010 ASSAY OF HAPTOGLOBIN QUANT: CPT

## 2025-04-08 PROCEDURE — 83021 HEMOGLOBIN CHROMOTOGRAPHY: CPT

## 2025-04-08 PROCEDURE — 85060 BLOOD SMEAR INTERPRETATION: CPT

## 2025-04-08 PROCEDURE — 83615 LACTATE (LD) (LDH) ENZYME: CPT

## 2025-04-08 PROCEDURE — 83020 HEMOGLOBIN ELECTROPHORESIS: CPT

## 2025-04-08 PROCEDURE — 85045 AUTOMATED RETICULOCYTE COUNT: CPT

## 2025-04-08 RX ORDER — LOSARTAN POTASSIUM 25 MG/1
25 TABLET ORAL DAILY
COMMUNITY
Start: 2025-03-24

## 2025-04-08 NOTE — PATIENT INSTRUCTIONS
Please obtain the following blood work    Take FERROUS SULFATE 325 mg daily. IF you have stomach upset/ constipation, take it every other day. Recheck Iron levels in 3 months.     Please see a gastroenterology for the iron deficiency.

## 2025-04-09 LAB
HGB A2 MFR BLD: 2.5 % (ref 1.5–3.5)
HGB PNL BLD ELPH: 97.5 % (ref 95.5–100)

## 2025-04-12 ENCOUNTER — TELEPHONE (OUTPATIENT)
Dept: FAMILY MEDICINE CLINIC | Facility: CLINIC | Age: 75
End: 2025-04-12

## 2025-04-12 DIAGNOSIS — D50.8 OTHER IRON DEFICIENCY ANEMIA: Primary | ICD-10-CM

## 2025-04-12 NOTE — PROGRESS NOTES
Hematology/Oncology Initial Consultation Note    Patient Name: Narcisa Teague  Medical Record Number: [id number]    YOB: 1950   Date of Consultation: 4/12/2025   PCP: Jesenia Smith MD       Reason for Consultation:  Narcisa Teague was seen today for the diagnosis of Anemia         ===================================================  History of Present Illness:    74-year-old lady with hypertension presents to the clinic to establish care for anemia.  Patient was noted to have a chronic anemia with hemoglobin around 7.5 since 2021.  Also noted to have an MCV of around 76-77.  Normal white cells and platelets noted.  Patient was noted to have normal hemoglobin electrophoresis, decreased reticulocyte count.     Patient was noted to have low iron saturation of 9, with ferritin of 15.  Vitamin B12 and folate levels were noted to be normal.  H peripheral smear review revealed emolysis lab was negative with normal LDH and haptoglobin.    Hypochromic microcytic RBCs noted on peripheral smear.      Denies having any active bleeding.  No melena or hematochezia.  No history of having any malabsorption, celiac disease.  No history of having any gastric surgery or PPI.    Colonoscopy in 2021:Diverticulosis.     Past Medical History:  Past Medical History[1]    Past Surgical History[2]    Home Medications:  Current Medications[3]  -------  Medications Ordered Prior to Encounter[4]    Allergies:   Allergies[5]    Psychosocial History:  Social History     Social History Narrative    Not on file     Short Social Hx on File[6]    Family Medical History:  Family History[7]    Review of Systems:  A 10-point ROS was done with pertinent positives and negative per the HPI    Vital Signs:  Height: --  Weight: --  BSA (Calculated - sq m): --  Pulse: --  BP: --  Temp: --  Do Not Use - Resp Rate: --  SpO2: --    Wt Readings from Last 6 Encounters:   04/08/25 60.8 kg (134 lb)   03/06/25 60.5 kg (133 lb 6.4 oz)   02/25/25  61.3 kg (135 lb 3.2 oz)   12/31/24 60.3 kg (133 lb)   06/25/24 60.3 kg (133 lb)   05/23/24 60.7 kg (133 lb 12.8 oz)       ECOG PS: 0    Physical Examination:  General: Patient is alert and oriented, not in acute distress  Psych: Mood and affect are appropriate  Eyes: EOMI, PERRL  ENT: Oropharynx is clear, no adenopathy  CV: Regular rate and rhythm, normal S1S2, no murmurs, no LE edema  Respiratory: Lungs clear to auscultation bilaterally  GI/Abd: Soft, non-tender with normoactive bowel sounds, no hepatosplenomegaly  Neurological: Grossly intact   Lymphatics: No palpable cervical, supraclavicular, axillary, or inguinal lymphadenopathy  Skin: no rashes or petechiae      Laboratory:  Lab Results   Component Value Date    WBC 7.3 04/08/2025    WBC 7.8 02/25/2025    WBC 12.0 (H) 02/24/2025    HGB 11.4 (L) 04/08/2025    HGB 11.3 (L) 02/25/2025    HGB 10.6 (L) 02/25/2025    HCT 36.3 04/08/2025    MCV 78.6 (L) 04/08/2025    MCH 24.7 (L) 04/08/2025    MCHC 31.4 04/08/2025    RDW 16.4 (H) 04/08/2025    .0 04/08/2025    .0 02/25/2025    .0 02/24/2025     Lab Results   Component Value Date    GLU 97 02/25/2025    BUN 14 02/25/2025    BUNCREA 20.9 (H) 02/25/2025    CREATSERUM 0.67 02/25/2025    CREATSERUM 0.86 02/24/2025    CREATSERUM 0.74 02/23/2025    ANIONGAP 8 02/25/2025    GFRNAA 84 03/24/2022    GFRAA 97 03/24/2022    CA 8.0 (L) 02/25/2025    OSMOCALC 288 02/25/2025    ALKPHO 95 02/22/2025     (H) 02/22/2025     (H) 02/22/2025    BILT 0.4 02/22/2025    TP 7.3 02/22/2025    ALB 4.3 02/22/2025    GLOBULIN 3.0 02/22/2025     02/25/2025    K 3.6 02/25/2025     02/25/2025    CO2 24.0 02/25/2025     Lab Results   Component Value Date    INR 0.99 02/22/2025       Imaging:    NA    Impression & Plan:     74-year-old female with chronic microcytic anemia, mild.    -Will have low iron saturation and ferritin.  Vitamin B12, folate levels were normal.  No signs of hemolysis.  Reticulocyte  count is low likely in setting of iron deficiency.  Patient to continue on oral iron supplementation.  Given her age and iron deficiency patient was recommended to follow-up with GI for further evaluation.    Continue oral iron.     Follow up in 2-3 months with repeat Iron studies, CBC.     GI consultation.     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      dilTIAZem HCl ER Beads 120 MG Oral Capsule SR 24 Hr Take 1 capsule (120 mg total) by mouth daily. 30 capsule 0    atorvastatin 40 MG Oral Tab Take 1 tablet every day by oral route. 90 tablet 4   [4]   Current Outpatient Medications on File Prior to Visit   Medication Sig Dispense Refill    losartan 25 MG Oral Tab Take 1 tablet (25 mg total) by mouth daily.      dilTIAZem HCl ER Beads 120 MG Oral Capsule SR 24 Hr Take 1 capsule (120 mg total) by mouth daily. 30 capsule 0    atorvastatin 40 MG Oral Tab Take 1 tablet every day by oral route. 90 tablet 4    cephALEXin 500 MG Oral Cap Take 1 capsule (500 mg total) by mouth 2 (two) times daily. (Patient not taking: Reported on 2025) 14 capsule 0    estradiol 0.1 MG/GM Vaginal Cream Place 1 g vaginally daily. For 1 week then every other day (Patient not taking: Reported on 2025) 42 g 4    [] apixaban 5 MG Oral Tab Take 1 tablet (5 mg total) by mouth 2 (two) times daily. 60 tablet 0    [] metoprolol succinate ER  100 MG Oral Tablet 24 Hr Take 1 tablet (100 mg total) by mouth Daily Beta Blocker. 30 tablet 0     No current facility-administered medications on file prior to visit.   [5] No Known Allergies  [6]   Social History  Socioeconomic History    Marital status:    Tobacco Use    Smoking status: Never    Smokeless tobacco: Never   Vaping Use    Vaping status: Never Used   Substance and Sexual Activity    Alcohol use: No    Drug use: Never     Social Drivers of Health     Food Insecurity: No Food Insecurity (2/22/2025)    NCSS - Food Insecurity     Worried About Running Out of Food in the Last Year: No     Ran Out of Food in the Last Year: No   Transportation Needs: No Transportation Needs (2/22/2025)    NCSS - Transportation     Lack of Transportation: No   Housing Stability: Not At Risk (2/22/2025)    NCSS - Housing/Utilities     Has Housing: Yes     Worried About Losing Housing: No     Unable to Get Utilities: No   [7]   Family History  Family history unknown: Yes

## 2025-04-14 ENCOUNTER — TELEPHONE (OUTPATIENT)
Facility: CLINIC | Age: 75
End: 2025-04-14

## 2025-04-14 NOTE — TELEPHONE ENCOUNTER
Called patient verified full name and . Informed patient that referral was placed. Patient verbalized understanding and did not have any further questions.

## 2025-04-14 NOTE — TELEPHONE ENCOUNTER
Heme/onc request follow up SHAQUILLE  Please schedule first available or discharge clinic ok too    Thank you.

## 2025-04-14 NOTE — TELEPHONE ENCOUNTER
RN called pt with help of  [phone] x [phone], no answer to either call and unable to leave a message (VM not set up).    RN called daughter Priya, no answer so left message. GI office number provided.

## 2025-04-23 NOTE — TELEPHONE ENCOUNTER
RN called daughter Priya, no answer so left message. GI office number provided.     Pt currently scheduled for a clinic appt with Dr. Yeh on 7/16/25 but we can get pt in for a sooner clinic eval (with any provider) per below MD message.

## 2025-04-29 ENCOUNTER — TELEPHONE (OUTPATIENT)
Facility: CLINIC | Age: 75
End: 2025-04-29

## 2025-04-29 ENCOUNTER — OFFICE VISIT (OUTPATIENT)
Facility: CLINIC | Age: 75
End: 2025-04-29

## 2025-04-29 VITALS
BODY MASS INDEX: 22.88 KG/M2 | DIASTOLIC BLOOD PRESSURE: 67 MMHG | HEART RATE: 45 BPM | SYSTOLIC BLOOD PRESSURE: 129 MMHG | HEIGHT: 64 IN | WEIGHT: 134 LBS

## 2025-04-29 DIAGNOSIS — D50.9 IRON DEFICIENCY ANEMIA, UNSPECIFIED IRON DEFICIENCY ANEMIA TYPE: Primary | ICD-10-CM

## 2025-04-29 PROCEDURE — 3008F BODY MASS INDEX DOCD: CPT | Performed by: NURSE PRACTITIONER

## 2025-04-29 PROCEDURE — 99204 OFFICE O/P NEW MOD 45 MIN: CPT | Performed by: NURSE PRACTITIONER

## 2025-04-29 PROCEDURE — 1160F RVW MEDS BY RX/DR IN RCRD: CPT | Performed by: NURSE PRACTITIONER

## 2025-04-29 PROCEDURE — 3074F SYST BP LT 130 MM HG: CPT | Performed by: NURSE PRACTITIONER

## 2025-04-29 PROCEDURE — 1159F MED LIST DOCD IN RCRD: CPT | Performed by: NURSE PRACTITIONER

## 2025-04-29 PROCEDURE — 1126F AMNT PAIN NOTED NONE PRSNT: CPT | Performed by: NURSE PRACTITIONER

## 2025-04-29 PROCEDURE — 3078F DIAST BP <80 MM HG: CPT | Performed by: NURSE PRACTITIONER

## 2025-04-29 RX ORDER — METOPROLOL SUCCINATE 100 MG/1
TABLET, EXTENDED RELEASE ORAL
COMMUNITY
Start: 2025-03-24

## 2025-04-29 RX ORDER — APIXABAN 5 MG/1
TABLET, FILM COATED ORAL
COMMUNITY
Start: 2025-04-21

## 2025-04-29 NOTE — H&P
[facility] - Gastroenterology                                                                                                               Reason for consult: anemia     Requesting physician or provider: Jesenia Smith MD    Chief Complaint   Patient presents with    Anemia       HPI:   Narcisa Teague is a 74 year old year-old female with medical history of a fib, CAD, OA. anemia.     she is here today for evaluation of anemia.  Hgb around 11 since 2019 per chart review.   Negative FIT test 3/2025.    Lab Results   Component Value Date    WBC 7.3 04/08/2025    RBC 4.62 04/08/2025    HGB 11.4 (L) 04/08/2025    HCT 36.3 04/08/2025    MCV 78.6 (L) 04/08/2025    MCH 24.7 (L) 04/08/2025    MCHC 31.4 04/08/2025    RDW 16.4 (H) 04/08/2025    .0 04/08/2025        Ref Range & Units (hover) 3/19/25  9:17 AM   Iron 33 Low    Transferrin 293   Total Iron Binding Capacity 366   % Saturation 9 Low        Ref Range & Units (hover) 4/8/25  2:56 PM   Ferritin 15 Low        Patient denies symptoms of nausea, vomiting, dyspepsia, dysphagia, odynophagia, globus sensation, heartburn, hematemesis, abdominal pain, change in bowel habits, constipation, diarrhea, hematochezia, or melena. she denies recent change in appetite, fever or unintentional weight loss.      Last colonoscopy: 2021 Dr. Yeh  Findings:   1. NO polyp(s) noted n     2. Diverticulosis: pandiverticulosis     3. Terminal ileum: the visualized mucosa appeared normal.     4. A retroflexed view of the rectum revealed hemorrhoids.     5. The colonic mucosa throughout the colon showed normal vascular pattern, without evidence of angioectasias or inflammation.      6. ADONIS: normal rectal tone, no masses palpated.         Recommend:  Repeat in 10 years. If new signs or symptoms develop, colonoscopy may need to be repeated sooner.   High fiber diet.  Monitor for blood in the  stool. If having more than just tinge of blood, call office or go to the ER.     Last EGD: none     NSAIDS: none  Tobacco: none  Alcohol: none  Marijuana: none  Illicit drugs: none    No family history of GI malignancy or IBD.     No history of adverse reaction to sedation  No CLAIR  +eliquis   No pacemaker/defibrillator    Wt Readings from Last 6 Encounters:   04/29/25 134 lb (60.8 kg)   04/08/25 134 lb (60.8 kg)   03/06/25 133 lb 6.4 oz (60.5 kg)   02/25/25 135 lb 3.2 oz (61.3 kg)   12/31/24 133 lb (60.3 kg)   06/25/24 133 lb (60.3 kg)        History, Medications, Allergies, ROS:      Past Medical History[1]   Past Surgical History[2]   Family Hx: Family History[3]   Social History: Short Social Hx on File[4]     Medications (Active prior to today's visit):  Current Medications[5]    Allergies:  Allergies[6]    ROS:   CONSTITUTIONAL: negative for fevers, chills, sweats  EYES Negative for scleral icterus or redness, and diplopia  HEENT: Negative for hoarseness  RESPIRATORY: Negative for cough and severe shortness of breath  CARDIOVASCULAR: Negative for crushing sub-sternal chest pain  GASTROINTESTINAL: See HPI  GENITOURINARY: Negative for dysuria  MUSCULOSKELETAL: Negative for arthralgias and myalgias  SKIN: Negative for jaundice, rash or pruritus  NEUROLOGICAL: Negative for dizziness and headaches  BEHAVIOR/PSYCH: Negative for psychotic behavior    PHYSICAL EXAM:   Blood pressure 129/67, pulse (!) 45, height 5' 4\" (1.626 m), weight 134 lb (60.8 kg).    GEN: Alert, no acute distress, well-nourished   HEENT: anicteric sclera, neck supple, trachea midline, MMM, no palpable or tender neck or supraclavicular lymph nodes  CV: RRR, the extremities are warm and well perfused   LUNGS: No increased work of breathing, CTAB  ABDOMEN: Soft, symmetrical, non-tender without distention or guarding. No scars or lesions. Umbilicus is midline without herniation. Normoactive bowel sounds are present, No masses, hepatomegaly or  splenomegaly noted.   MSK: No erythema, no warmth, no swelling of joints  SKIN: No jaundice, no erythema, no rashes, no lesions  HEMATOLOGIC: No bleeding, no bruising  NEURO: Alert and interactive, SQUIRES  PSYCH: appropriate mood & affect    Labs/Imaging/Procedures:     Patient's pertinent labs and imaging were reviewed and discussed with patient today.        .  ASSESSMENT/PLAN:   Narcisa Teague is a 74 year old year-old female with medical history of a fib, CAD, OA. anemia.     1. Iron deficiency anemia, unspecified iron deficiency anemia type     Anemia is not new onset since last colonoscopy with no concerning findings. Will complete EGD/CLN when given cardiac clearance as she was recently hospitalized with new onset Afib with RVR and will need to be off DOAC.     Patient Instructions   1. Schedule colonoscopy+ EGD with Dr. Yeh     Recent diagnosis of a-fib, needs to get cardiac clearance  Diagnosis: anemia   Sedation: MAC  Prep: split dose golytely    2. MEDICATION CHANGES PRIOR TO PROCEDURE    *HOLD losartan the day before and day of procedure   *HOLD 48 hours BEFORE procedure *HOLD rivaroxaban (Xarelto) apixaban (Eliquis) (savaysa/pradaxa too    14 days BEFORE procedure: *HOLD Iron pills, herbal supplements, multivitamins, or   DO NOT TAKE: Any form of alcohol, recreational drugs and any forms of erectile dysfunction medications 24 hours prior to procedure.    GI RNs please reach out to cardiology Dr. Do at [facility] for approval to hold anticoagulant/antiplatelet prior to procedure. Also request cardiac clearance. To the patient: you are RESPONSIBLE for contacting your cardiologist to be sure blood thinner modifications are approved and no further cardiac testing is needed for cardiac clearance. Failure to obtain clearance can result in procedure cancellations.     3.  bowel prep from pharmacy   You can pick the bowel prep up now and store in a cool, dry place in your home until your scheduled bowel  prep start date.    4. Read all bowel prep instructions carefully. Bowel prep instructions can also be found online at:  [email or web address]     5. AVOID seeds, nuts, popcorn, raw fruits and vegetables for 5 days before procedure    6. If you start any NEW medication after your visit today, please notify us. Certain medications (like iron or weight loss medications) will need to be held before the procedure, or the procedure cannot be performed safely.         Orders This Visit:  No orders of the defined types were placed in this encounter.      Meds This Visit:  Requested Prescriptions     Signed Prescriptions Disp Refills    polyethylene glycol, PEG 3350-KCl-NaBcb-NaCl-NaSulf, 236 g Oral Recon Soln 1 each 0     Sig: Take 4,000 mL by mouth As Directed. Take 2,000 mL the night before your procedure and 2,000 mL the morning of your procedure.       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, right    R thenar mass    Essential hypertension    High blood pressure    PONV (postoperative nausea and vomiting)   [2]   Past Surgical History:  Procedure Laterality Date          Colonoscopy      Buddy    Excis tumor/avm,deep,hand/fingr Right     Excision R thenar mass    Knee replacement surgery Right 2019   [3]   Family History  Problem Relation Age of Onset    Colon Cancer Neg    [4]   Social History  Socioeconomic History    Marital status:    Tobacco Use    Smoking status: Never    Smokeless tobacco: Never   Vaping Use    Vaping status: Never Used   Substance and Sexual Activity    Alcohol use: No    Drug use: Never     Social Drivers of Health     Food Insecurity: No Food Insecurity (2025)    NCSS - Food Insecurity     Worried About Running Out of Food in the Last Year: No     Ran Out of Food in the Last Year: No   Transportation Needs: No  Transportation Needs (2/22/2025)    NCSS - Transportation     Lack of Transportation: No   Housing Stability: Not At Risk (2/22/2025)    NCSS - Housing/Utilities     Has Housing: Yes     Worried About Losing Housing: No     Unable to Get Utilities: No   [5]   Current Outpatient Medications   Medication Sig Dispense Refill    metoprolol succinate  MG Oral Tablet 24 Hr       ELIQUIS 5 MG Oral Tab Take 1 tablet twice a day by oral route.      polyethylene glycol, PEG 3350-KCl-NaBcb-NaCl-NaSulf, 236 g Oral Recon Soln Take 4,000 mL by mouth As Directed. Take 2,000 mL the night before your procedure and 2,000 mL the morning of your procedure. 1 each 0    losartan 25 MG Oral Tab Take 1 tablet (25 mg total) by mouth daily.      dilTIAZem HCl ER Beads 120 MG Oral Capsule SR 24 Hr Take 1 capsule (120 mg total) by mouth daily. 30 capsule 0    atorvastatin 40 MG Oral Tab Take 1 tablet every day by oral route. 90 tablet 4    cephALEXin 500 MG Oral Cap Take 1 capsule (500 mg total) by mouth 2 (two) times daily. (Patient not taking: Reported on 4/8/2025) 14 capsule 0    estradiol 0.1 MG/GM Vaginal Cream Place 1 g vaginally daily. For 1 week then every other day (Patient not taking: Reported on 4/8/2025) 42 g 4   [6] No Known Allergies

## 2025-04-29 NOTE — PATIENT INSTRUCTIONS
1. Schedule colonoscopy+ EGD with Dr. Yeh     Recent diagnosis of a-fib, needs to get cardiac clearance  Diagnosis: anemia   Sedation: MAC  Prep: split dose golytely    2. MEDICATION CHANGES PRIOR TO PROCEDURE    *HOLD losartan the day before and day of procedure   *HOLD 48 hours BEFORE procedure *HOLD rivaroxaban (Xarelto) apixaban (Eliquis) (savaysa/pradaxa too    14 days BEFORE procedure: *HOLD Iron pills, herbal supplements, multivitamins, or   DO NOT TAKE: Any form of alcohol, recreational drugs and any forms of erectile dysfunction medications 24 hours prior to procedure.    GI RNs please reach out to cardiology Dr. Do at [facility] for approval to hold anticoagulant/antiplatelet prior to procedure. Also request cardiac clearance. To the patient: you are RESPONSIBLE for contacting your cardiologist to be sure blood thinner modifications are approved and no further cardiac testing is needed for cardiac clearance. Failure to obtain clearance can result in procedure cancellations.     3.  bowel prep from pharmacy   You can pick the bowel prep up now and store in a cool, dry place in your home until your scheduled bowel prep start date.    4. Read all bowel prep instructions carefully. Bowel prep instructions can also be found online at:  [email or web address]     5. AVOID seeds, nuts, popcorn, raw fruits and vegetables for 5 days before procedure    6. If you start any NEW medication after your visit today, please notify us. Certain medications (like iron or weight loss medications) will need to be held before the procedure, or the procedure cannot be performed safely.

## 2025-04-29 NOTE — TELEPHONE ENCOUNTER
Schedulers- Patient was seen in office today, please call patient to schedule procedure per providers orders below. I reviewed and handed a copy of prep instructions with patient in office as well as medications. Patient is aware of different locations and our providers possibly booking out. No further questions asked.        . Schedule colonoscopy+ EGD with Dr. Yeh     Recent diagnosis of a-fib, needs to get cardiac clearance  Diagnosis: anemia   Sedation: MAC  Prep: split dose golytely     2. MEDICATION CHANGES PRIOR TO PROCEDURE     *HOLD losartan the day before and day of procedure   *HOLD 48 hours BEFORE procedure *HOLD rivaroxaban (Xarelto) apixaban (Eliquis) (savaysa/pradaxa too     14 days BEFORE procedure: *HOLD Iron pills, herbal supplements, multivitamins, or   DO NOT TAKE: Any form of alcohol, recreational drugs and any forms of erectile dysfunction medications 24 hours prior to procedure.     GI RNs please reach out to cardiology Dr. Do at [facility] for approval to hold anticoagulant/antiplatelet prior to procedure. Also request cardiac clearance. To the patient: you are RESPONSIBLE for contacting your cardiologist to be sure blood thinner modifications are approved and no further cardiac testing is needed for cardiac clearance. Failure to obtain clearance can result in procedure cancellations.      3.  bowel prep from pharmacy   You can pick the bowel prep up now and store in a cool, dry place in your home until your scheduled bowel prep start date.     4. Read all bowel prep instructions carefully. Bowel prep instructions can also be found online at:  [email or web address]      5. AVOID seeds, nuts, popcorn, raw fruits and vegetables for 5 days before procedure     6. If you start any NEW medication after your visit today, please notify us. Certain medications (like iron or weight loss medications) will need to be held before the procedure, or the procedure cannot be performed safely.

## 2025-04-30 NOTE — TELEPHONE ENCOUNTER
Scheduled for:  Colonoscopy 20956 EGD [id number]  Provider Name:    Date:  Wednesday, 07/30/2025  Location:  [facility]  Sedation:  MAC  Time:  10:40 AM (pt is aware ENDO will call with arrival time    Prep:   golytely   Meds/Allergies Reconciled?:  Yes    Diagnosis with codes:  anemia D50.9  Was patient informed to call insurance with codes (Y/N): Yes      Referral sent?:  Referral was sent at the time of electronic surgical scheduling.    EM or [facility] notified?:  I sent an electronic request to Endo Scheduling and received a confirmation today.       Medication Orders:  *HOLD losartan the day before and day of procedure   *HOLD 48 hours BEFORE procedure *HOLD rivaroxaban (Xarelto) apixaban (Eliquis) (savaysa/pradaxa too     14 days BEFORE procedure: *HOLD Iron pills, herbal supplements, multivitamins, or   DO NOT TAKE: Any form of alcohol, recreational drugs and any forms of erectile dysfunction medications 24 hours prior to procedure.  Misc Orders:  None      Further instructions given by staff:  I discussed the prep instructions with the patient which she  verbally understood with help from the  ID#[id number] and is aware that I will mail the instructions today.via [employer]

## 2025-04-30 NOTE — TELEPHONE ENCOUNTER
Left message for patient to call back to schedule Colonoscopy.     Please transfer call to GI surgery scheduling      ID#[id number]

## 2025-04-30 NOTE — TELEPHONE ENCOUNTER
Left message for patient to call back to schedule Colonoscopy.     Please transfer call to GI surgery scheduling      ID#[id number]

## 2025-05-02 ENCOUNTER — TELEPHONE (OUTPATIENT)
Facility: CLINIC | Age: 75
End: 2025-05-02

## 2025-05-02 NOTE — TELEPHONE ENCOUNTER
Hello RN      Patient needs to get cardiac clearance     Patient was not sure who her card doctor is     Patient advise we call her Daughter Dyana [phone]

## 2025-05-02 NOTE — TELEPHONE ENCOUNTER
ReScheduled for:  Colonoscopy 89053 EGD [id number]  Provider Name:    Date:  From- Wednesday, 07/30/2025 To- Monday October, 13,2025  Location:  [facility]  Sedation:  MAC  Time:  From- 10:40 AM 8:10 AM  (pt is aware ENDO will call with arrival time    Prep:   golytely   Meds/Allergies Reconciled?:  Yes    Diagnosis with codes:  anemia D50.9  Was patient informed to call insurance with codes (Y/N): Yes      Referral sent?:  Referral was sent at the time of electronic surgical scheduling.    EM or [facility] notified?:  I sent an electronic request to Endo Scheduling and received a confirmation today.       Medication Orders:  *HOLD losartan the day before and day of procedure   *HOLD 48 hours BEFORE procedure *HOLD rivaroxaban (Xarelto) apixaban (Eliquis) (savaysa/pradaxa too     14 days BEFORE procedure: *HOLD Iron pills, herbal supplements, multivitamins, or   DO NOT TAKE: Any form of alcohol, recreational drugs and any forms of erectile dysfunction medications 24 hours prior to procedure.  Misc Orders:  None      Further instructions given by staff: I discussed the prep instructions with the patient daughter which she verbally understood and is aware that I will send the instructions today.via [employer]

## 2025-05-05 NOTE — TELEPHONE ENCOUNTER
RN to contact cardiology closer to procedure date to get cardiac clearance and approval to hold eliquis x 2 days before procedure.    Pt currently scheduled for 10/13/25. Per chart, pt sees [facility] cardiology.     Patient advise we call her Daughter Dyana [phone]

## 2025-05-28 ENCOUNTER — LAB ENCOUNTER (OUTPATIENT)
Dept: LAB | Age: 75
End: 2025-05-28
Attending: FAMILY MEDICINE
Payer: MEDICARE

## 2025-05-28 ENCOUNTER — OFFICE VISIT (OUTPATIENT)
Dept: FAMILY MEDICINE CLINIC | Facility: CLINIC | Age: 75
End: 2025-05-28

## 2025-05-28 VITALS
HEART RATE: 45 BPM | WEIGHT: 132.19 LBS | DIASTOLIC BLOOD PRESSURE: 60 MMHG | SYSTOLIC BLOOD PRESSURE: 136 MMHG | BODY MASS INDEX: 22.57 KG/M2 | HEIGHT: 64 IN

## 2025-05-28 DIAGNOSIS — Z00.00 ENCOUNTER FOR ANNUAL HEALTH EXAMINATION: ICD-10-CM

## 2025-05-28 DIAGNOSIS — I48.91 ATRIAL FIBRILLATION WITH RAPID VENTRICULAR RESPONSE (HCC): ICD-10-CM

## 2025-05-28 DIAGNOSIS — H90.3 SENSORINEURAL HEARING LOSS (SNHL) OF BOTH EARS: ICD-10-CM

## 2025-05-28 DIAGNOSIS — Z78.0 POST-MENOPAUSAL: ICD-10-CM

## 2025-05-28 DIAGNOSIS — I25.10 CORONARY ARTERY DISEASE INVOLVING NATIVE HEART WITHOUT ANGINA PECTORIS, UNSPECIFIED VESSEL OR LESION TYPE: ICD-10-CM

## 2025-05-28 DIAGNOSIS — R73.03 PREDIABETES: ICD-10-CM

## 2025-05-28 DIAGNOSIS — D50.8 OTHER IRON DEFICIENCY ANEMIA: ICD-10-CM

## 2025-05-28 DIAGNOSIS — I10 ESSENTIAL HYPERTENSION: Chronic | ICD-10-CM

## 2025-05-28 DIAGNOSIS — E55.9 VITAMIN D DEFICIENCY: ICD-10-CM

## 2025-05-28 DIAGNOSIS — M85.80 OSTEOPENIA, UNSPECIFIED LOCATION: ICD-10-CM

## 2025-05-28 DIAGNOSIS — R35.0 URINARY FREQUENCY: ICD-10-CM

## 2025-05-28 DIAGNOSIS — I34.2 NONRHEUMATIC MITRAL VALVE STENOSIS: ICD-10-CM

## 2025-05-28 DIAGNOSIS — I48.91 ATRIAL FIBRILLATION WITH RAPID VENTRICULAR RESPONSE (HCC): Primary | ICD-10-CM

## 2025-05-28 DIAGNOSIS — Z12.31 SCREENING MAMMOGRAM FOR BREAST CANCER: ICD-10-CM

## 2025-05-28 DIAGNOSIS — M17.11 OSTEOARTHRITIS OF RIGHT KNEE, UNSPECIFIED OSTEOARTHRITIS TYPE: ICD-10-CM

## 2025-05-28 DIAGNOSIS — H93.13 TINNITUS OF BOTH EARS: ICD-10-CM

## 2025-05-28 PROBLEM — M81.0 AGE-RELATED OSTEOPOROSIS WITHOUT CURRENT PATHOLOGICAL FRACTURE: Status: RESOLVED | Noted: 2022-09-22 | Resolved: 2025-05-28

## 2025-05-28 PROBLEM — R73.9 HYPERGLYCEMIA: Status: RESOLVED | Noted: 2025-02-22 | Resolved: 2025-05-28

## 2025-05-28 LAB
ALBUMIN SERPL-MCNC: 4.5 G/DL (ref 3.2–4.8)
ALBUMIN/GLOB SERPL: 1.7 {RATIO} (ref 1–2)
ALP LIVER SERPL-CCNC: 78 U/L (ref 55–142)
ALT SERPL-CCNC: 34 U/L (ref 10–49)
ANION GAP SERPL CALC-SCNC: 9 MMOL/L (ref 0–18)
AST SERPL-CCNC: 27 U/L (ref ?–34)
BILIRUB SERPL-MCNC: 0.6 MG/DL (ref 0.2–1.1)
BILIRUB UR QL: NEGATIVE
BUN BLD-MCNC: 10 MG/DL (ref 9–23)
BUN/CREAT SERPL: 13.3 (ref 10–20)
CALCIUM BLD-MCNC: 9.1 MG/DL (ref 8.7–10.4)
CHLORIDE SERPL-SCNC: 105 MMOL/L (ref 98–112)
CHOLEST SERPL-MCNC: 121 MG/DL (ref ?–200)
CLARITY UR: CLEAR
CO2 SERPL-SCNC: 27 MMOL/L (ref 21–32)
CREAT BLD-MCNC: 0.75 MG/DL (ref 0.55–1.02)
EGFRCR SERPLBLD CKD-EPI 2021: 83 ML/MIN/1.73M2 (ref 60–?)
EST. AVERAGE GLUCOSE BLD GHB EST-MCNC: 120 MG/DL (ref 68–126)
FASTING PATIENT LIPID ANSWER: YES
FASTING STATUS PATIENT QL REPORTED: YES
GLOBULIN PLAS-MCNC: 2.6 G/DL (ref 2–3.5)
GLUCOSE BLD-MCNC: 98 MG/DL (ref 70–99)
GLUCOSE UR-MCNC: NORMAL MG/DL
HBA1C MFR BLD: 5.8 % (ref ?–5.7)
HDLC SERPL-MCNC: 50 MG/DL (ref 40–59)
HGB UR QL STRIP.AUTO: NEGATIVE
KETONES UR-MCNC: NEGATIVE MG/DL
LDLC SERPL CALC-MCNC: 56 MG/DL (ref ?–100)
LEUKOCYTE ESTERASE UR QL STRIP.AUTO: NEGATIVE
NITRITE UR QL STRIP.AUTO: NEGATIVE
NONHDLC SERPL-MCNC: 71 MG/DL (ref ?–130)
OSMOLALITY SERPL CALC.SUM OF ELEC: 291 MOSM/KG (ref 275–295)
PH UR: 7 [PH] (ref 5–8)
POTASSIUM SERPL-SCNC: 3.9 MMOL/L (ref 3.5–5.1)
PROT SERPL-MCNC: 7.1 G/DL (ref 5.7–8.2)
PROT UR-MCNC: NEGATIVE MG/DL
SODIUM SERPL-SCNC: 141 MMOL/L (ref 136–145)
SP GR UR STRIP: 1.01 (ref 1–1.03)
T3FREE SERPL-MCNC: 3.57 PG/ML (ref 2.4–4.2)
T4 FREE SERPL-MCNC: 1.4 NG/DL (ref 0.8–1.7)
TRIGL SERPL-MCNC: 72 MG/DL (ref 30–149)
TSI SER-ACNC: 0.46 UIU/ML (ref 0.55–4.78)
UROBILINOGEN UR STRIP-ACNC: NORMAL
VIT B12 SERPL-MCNC: 1531 PG/ML (ref 211–911)
VIT D+METAB SERPL-MCNC: 59.7 NG/ML (ref 30–100)
VLDLC SERPL CALC-MCNC: 10 MG/DL (ref 0–30)

## 2025-05-28 PROCEDURE — 1126F AMNT PAIN NOTED NONE PRSNT: CPT | Performed by: FAMILY MEDICINE

## 2025-05-28 PROCEDURE — 36415 COLL VENOUS BLD VENIPUNCTURE: CPT

## 2025-05-28 PROCEDURE — 84439 ASSAY OF FREE THYROXINE: CPT

## 2025-05-28 PROCEDURE — 82607 VITAMIN B-12: CPT

## 2025-05-28 PROCEDURE — 96160 PT-FOCUSED HLTH RISK ASSMT: CPT | Performed by: FAMILY MEDICINE

## 2025-05-28 PROCEDURE — 3008F BODY MASS INDEX DOCD: CPT | Performed by: FAMILY MEDICINE

## 2025-05-28 PROCEDURE — 84481 FREE ASSAY (FT-3): CPT

## 2025-05-28 PROCEDURE — 3078F DIAST BP <80 MM HG: CPT | Performed by: FAMILY MEDICINE

## 2025-05-28 PROCEDURE — G0439 PPPS, SUBSEQ VISIT: HCPCS | Performed by: FAMILY MEDICINE

## 2025-05-28 PROCEDURE — 81003 URINALYSIS AUTO W/O SCOPE: CPT

## 2025-05-28 PROCEDURE — 80061 LIPID PANEL: CPT

## 2025-05-28 PROCEDURE — 1170F FXNL STATUS ASSESSED: CPT | Performed by: FAMILY MEDICINE

## 2025-05-28 PROCEDURE — 83036 HEMOGLOBIN GLYCOSYLATED A1C: CPT

## 2025-05-28 PROCEDURE — 82306 VITAMIN D 25 HYDROXY: CPT

## 2025-05-28 PROCEDURE — 84443 ASSAY THYROID STIM HORMONE: CPT

## 2025-05-28 PROCEDURE — 3075F SYST BP GE 130 - 139MM HG: CPT | Performed by: FAMILY MEDICINE

## 2025-05-28 PROCEDURE — 99499 UNLISTED E&M SERVICE: CPT | Performed by: FAMILY MEDICINE

## 2025-05-28 PROCEDURE — 80053 COMPREHEN METABOLIC PANEL: CPT

## 2025-05-28 PROCEDURE — 1159F MED LIST DOCD IN RCRD: CPT | Performed by: FAMILY MEDICINE

## 2025-05-28 NOTE — PROGRESS NOTES
The following individual(s) verbally consented to be recorded using ambient AI listening technology and understand that they can each withdraw their consent to this listening technology at any point by asking the clinician to turn off or pause the recording:    Patient name: Narcisa Teague  Subjective:   Narcisa Teague is a 74 year old female who presents for a MA AHA (Medicare Advantage Annual Health Assessment) and Subsequent Annual Wellness visit (Pt already had Initial Annual Wellness) and scheduled follow up of multiple significant but stable problems.   History of Present Illness  Narcisa Teague is a 74 year old female with hypertension and atrial fibrillation who presents for follow-up of her blood pressure and palpitations.    Her blood pressure readings at home are typically controlled.     She experiences palpitations, which have improved with her current medication regimen. However, she notes that her pulse is low, which she attributes to the medication.    She continues to experience nocturia, which has not improved. She previously used a vaginal cream, which she has finished, and has not been using it recently.    She has a history of anemia, with her hemoglobin level currently at 11, slightly below the normal range of 12 to 14. No chest pain, shortness of breath, or leg swelling. She reports normal bowel movements.  Walking regularly.   She is taking atorvastatin at night for cholesterol management.    History/Other:   Fall Risk Assessment:   She has been screened for Falls and is low risk.      Cognitive Assessment:   She had a completely normal cognitive assessment - see flowsheet entries     Functional Ability/Status:   Narcisa Teague has a completely normal functional assessment. See flowsheet for details.      Depression Screening (PHQ):  PHQ-2 SCORE: 0  , done 5/28/2025          Advanced Directives:   She does NOT have a Living Will. [Do you have a living will?: No]  She has a Power of   for Health Care on file in [facility].  Discussed Advance Care Planning with patient (and family/surrogate if present). Standard forms made available to patient in After Visit Summary.      Problem List[1]  Allergies:  She has no known allergies.    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  Surgical History:  She  has a past surgical history that includes ; knee replacement surgery (Right, 2019); excis tumor/avm,deep,hand/fingr (Right); and colonoscopy ().   Family History:  Her family history is not on file.  Social History:  She  reports that she has never smoked. She has never used smokeless tobacco. She reports that she does not drink alcohol and does not use drugs.    Tobacco:  She has never smoked tobacco.    CAGE Alcohol Screen:   CAGE screening score of 0 on 2025, showing low risk of alcohol abuse.      Patient Care Team:  Jesenia Smith MD as PCP - General (Family Medicine)    Review of Systems     Negative    Objective:   Physical Exam  Constitutional:       Appearance: She is well-developed.   HENT:      Mouth/Throat:      Mouth: Mucous membranes are moist.   Eyes:      Conjunctiva/sclera: Conjunctivae normal.   Cardiovascular:      Rate and Rhythm: Normal rate and regular rhythm.      Heart sounds: Normal heart sounds.   Pulmonary:      Effort: Pulmonary effort is normal.      Breath sounds: Normal breath sounds.   Abdominal:      General: Bowel sounds are normal.      Palpations: Abdomen is soft.   Musculoskeletal:      Left lower leg: No edema.   Skin:     General: Skin is warm and dry.   Neurological:      Mental Status: She is alert and oriented to person, place, and time.   Psychiatric:         Behavior: Behavior normal.           /60   Pulse (!) 45    Ht 5' 4\" (1.626 m)   Wt 132 lb 3.2 oz (60 kg)   BMI 22.69 kg/m²  Estimated body mass index is 22.69 kg/m² as calculated from the following:    Height as of this encounter: 5' 4\" (1.626 m).    Weight as of this encounter: 132 lb 3.2 oz (60 kg).    Medicare Hearing Assessment:   Hearing Screening    Time taken: 5/28/2025  8:51 AM  Screening Method: Questionnaire  I have a problem hearing over the telephone: Yes I have trouble following the conversations when two or more people are talking at the same time: Yes   I have trouble understanding things on the TV: Yes I have to strain to understand conversations: Yes   I have to worry about missing the telephone ring or doorbell: No I have trouble hearing conversations in a noisy background such as a crowded room or restaurant: No   I get confused about where sounds come from: No I misunderstand some words in a sentence and need to ask people to repeat themselves: Yes   I especially have trouble understanding the speech of women and children: No I have trouble understanding the speaker in a large room such as at a meeting or place of Yarsanism: No   Many people I talk to seem to mumble (or don't speak clearly): No People get annoyed because I misunderstand what they say: No   I misunderstand what others are saying and make inappropriate responses: No I avoid social activities because I cannot hear well and fear I will reply improperly: No   Family members and friends have told me they think I may have hearing loss: No             Visual Acuity:   Right Eye Visual Acuity: Uncorrected Right Eye Chart Acuity: 20/30   Left Eye Visual Acuity: Uncorrected Left Eye Chart Acuity: 20/30   Both Eyes Visual Acuity: Uncorrected Both Eyes Chart Acuity: 20/30            Assessment & Plan:   Narcisa Teague is a 74 year old female who presents for a Medicare Assessment.     1. Atrial fibrillation with rapid ventricular response (HCC)  Per cards. Stable   - Lipid Panel; Future  - TSH W  Reflex To Free T4; Future  - Comp Metabolic Panel (14) [E]; Future    2. Nonrheumatic mitral valve stenosis  Per cards. Stable     3. Essential hypertension  Per cards. Stable     4. Coronary artery disease involving native heart without angina pectoris, unspecified vessel or lesion type  Per cards. Stable   - Lipid Panel; Future    5. Prediabetes  Due for labs.   - Hemoglobin A1C; Future    6. Osteopenia, unspecified location  Due for scan     7. Osteoarthritis of right knee, unspecified osteoarthritis type  Stable.     8. Tinnitus of both ears  Stable     9. Sensorineural hearing loss (SNHL) of both ears  Using hearing aids     10. Other iron deficiency anemia  Work up per heme and GI. Has been stable     11. Encounter for annual health examination    - [facility] MARY 2D+3D SCREENING BILAT (CPT=77067/21788); Future  - XR DEXA BONE DENSITOMETRY (CPT=77080); Future  - Lipid Panel; Future  - TSH W Reflex To Free T4; Future  - Vitamin D; Future  - Vitamin B12; Future  - Hemoglobin A1C; Future  - Comp Metabolic Panel (14) [E]; Future    12. Screening mammogram for breast cancer    - [facility] MARY 2D+3D SCREENING BILAT (CPT=77067/42095); Future    13. Post-menopausal    - XR DEXA BONE DENSITOMETRY (CPT=77080); Future    14. Vitamin D deficiency  Due for labs.   - Vitamin D; Future  - Vitamin B12; Future    15. Urinary frequency  Restart vaginal cream and check UA again.   - Urinalysis with Culture Reflex; Future    Assessment & Plan  Atrial fibrillation  Atrial fibrillation managed with medications. She feels well, denies palpitations. Blood pressure normal at home, slightly elevated in clinic.  - Continue current medications for atrial fibrillation.    Anemia  Chronic mild anemia with hemoglobin at 11 g/dL. Hematology involved, further studies planned. Colonoscopy scheduled pending cardiology approval for anticoagulation hold.  - Coordinate with cardiology for anticoagulation management prior to colonoscopy.  - Proceed with  hematology's plan for further studies.    Vaginal dryness due to menopause  Persistent vaginal dryness due to menopause, leading to nocturia. Previous vaginal estrogen cream course completed, effective in preventing UTIs.  - Prescribe additional vaginal estrogen cream.    General Health Maintenance  Routine health maintenance due, including bone density screening and mammogram. Missed mammogram last year.  - Order DEXA scan for bone density.  - Order mammogram.  - Instruct her to schedule mammogram appointment.  The patient indicates understanding of these issues and agrees to the plan.  Reinforced healthy diet, lifestyle, and exercise.      Return in about 6 months (around 11/28/2025) for BP follow up .     Jesenia Smith MD, 5/28/2025     Supplementary Documentation:   General Health:  In the past six months, have you lost more than 10 pounds without trying?: 2 - No  Has your appetite been poor?: No  Type of Diet: Balanced  How does the patient maintain a good energy level?: Daily Walks  How would you describe your daily physical activity?: Moderate  How would you describe your current health state?: Fair  How do you maintain positive mental well-being?: Visiting Family  On a scale of 0 to 10, with 0 being no pain and 10 being severe pain, what is your pain level?: 0 - (None)  In the past six months, have you experienced urine leakage?: 1-Yes  At any time do you feel concerned for the safety/well-being of yourself and/or your children, in your home or elsewhere?: No  Have you had any immunizations at another office such as Influenza, Hepatitis B, Tetanus, or Pneumococcal?: Yes    Health Maintenance   Topic Date Due    COVID-19 Vaccine (3 - 2024-25 season) 09/01/2024    Annual Well Visit  01/01/2025    HTN: BP Follow-Up  06/28/2025    Influenza Vaccine (Season Ended) 10/01/2025    Colorectal Cancer Screening  08/17/2031    DEXA Scan  Completed    Annual Depression Screening  Completed    Fall Risk Screening  (Annual)  Completed    Pneumococcal Vaccine: 50+ Years  Completed    Zoster Vaccines  Completed    Meningococcal B Vaccine  Aged Out    Mammogram  Discontinued            [1]   Patient Active Problem List  Diagnosis    Osteoarthritis of right knee    Essential hypertension    Nonrheumatic mitral valve stenosis    Coronary artery disease involving native heart without angina pectoris    Prediabetes    Osteopenia    Tinnitus of both ears    Sensorineural hearing loss (SNHL) of both ears    Atrial fibrillation with rapid ventricular response (HCC)   [2]   Outpatient Medications Marked as Taking for the 5/28/25 encounter (Office Visit) with Jesenia Smith MD   Medication Sig    metoprolol succinate  MG Oral Tablet 24 Hr     ELIQUIS 5 MG Oral Tab Take 1 tablet twice a day by oral route.    losartan 25 MG Oral Tab Take 1 tablet (25 mg total) by mouth daily.    dilTIAZem HCl ER Beads 120 MG Oral Capsule SR 24 Hr Take 1 capsule (120 mg total) by mouth daily.    atorvastatin 40 MG Oral Tab Take 1 tablet every day by oral route.

## 2025-05-28 NOTE — PROGRESS NOTES
Tests are all stable. Please continue with current treatment plan. Urine was normal, normal kidney, liver function. Improving glucose - still pre-diabetes range but improved. - Dr. Smith

## 2025-06-09 ENCOUNTER — TELEPHONE (OUTPATIENT)
Dept: FAMILY MEDICINE CLINIC | Facility: CLINIC | Age: 75
End: 2025-06-09

## 2025-06-09 NOTE — TELEPHONE ENCOUNTER
Spoke with patient via  [place] ID # [id number], Date of Birth verified.  She is returning our call, she was informed of below message.   She  stated understanding.     DIA

## 2025-06-09 NOTE — TELEPHONE ENCOUNTER
Received call from patient's dentist inquiring on medical history an medications.  Patient was seen in office today and was reported as poor historian.  Attempted to reach patient to obtain permission to release information.  Left message to call back.    Daughter Priya, on KENDR[place], contacted and states yes, her dentures broke and she was at the dentist today.  Ok to send requested infor.  Lst office note and medication list faxed to [phone].

## 2025-07-01 ENCOUNTER — OFFICE VISIT (OUTPATIENT)
Dept: OTOLARYNGOLOGY | Facility: CLINIC | Age: 75
End: 2025-07-01

## 2025-07-01 VITALS — HEIGHT: 64 IN | WEIGHT: 132 LBS | BODY MASS INDEX: 22.53 KG/M2

## 2025-07-01 DIAGNOSIS — L92.9 GRANULATION TISSUE: Primary | ICD-10-CM

## 2025-07-01 DIAGNOSIS — H61.23 CERUMEN DEBRIS ON TYMPANIC MEMBRANE OF BOTH EARS: ICD-10-CM

## 2025-07-01 PROCEDURE — 99213 OFFICE O/P EST LOW 20 MIN: CPT | Performed by: SPECIALIST

## 2025-07-01 PROCEDURE — 3008F BODY MASS INDEX DOCD: CPT | Performed by: SPECIALIST

## 2025-07-01 PROCEDURE — 1159F MED LIST DOCD IN RCRD: CPT | Performed by: SPECIALIST

## 2025-07-01 PROCEDURE — 1160F RVW MEDS BY RX/DR IN RCRD: CPT | Performed by: SPECIALIST

## 2025-07-01 RX ORDER — DILTIAZEM HYDROCHLORIDE 120 MG/1
CAPSULE, COATED, EXTENDED RELEASE ORAL
COMMUNITY
Start: 2025-06-02

## 2025-07-01 RX ORDER — OFLOXACIN 3 MG/ML
5 SOLUTION AURICULAR (OTIC) NIGHTLY
Qty: 5 ML | Refills: 0 | Status: SHIPPED | OUTPATIENT
Start: 2025-07-01 | End: 2025-07-15

## 2025-07-01 NOTE — PATIENT INSTRUCTIONS
There is granulation tissue in the right external auditory canal.  I placed you back on your Floxin drops.  Follow-up in 6 months time, sooner if problems.

## 2025-07-01 NOTE — PROGRESS NOTES
Narcisa Teague is a 74 year old female.   Chief Complaint   Patient presents with    Ear Wax     Ear cleaning     HPI:   Patient here wears binaural hearing aids.  Needs to get her ears cleaned and checked.    Current Medications[1]   Past Medical History[2]   Social History:  Short Social Hx on File[3]     REVIEW OF SYSTEMS:   GENERAL HEALTH: feels well otherwise  GENERAL : denies fever, chills, sweats, weight loss, weight gain  SKIN: denies any unusual skin lesions or rashes  RESPIRATORY: denies shortness of breath with exertion  NEURO: denies headaches    EXAM:   Ht 5' 4\" (1.626 m)   Wt 132 lb (59.9 kg)   BMI 22.66 kg/m²   System Details   Skin Inspection - Normal.   Constitutional Overall appearance - Normal.   Head/Face Facial features - Normal. Eyebrows - Normal. Skull - Normal.   Eyes Conjunctiva - Right: Normal, Left: Normal. Pupil - Right: Normal, Left: Normal.    Ears Inspection - Right: Normal, Left: Normal.   Ears = bilateral cerumen occlussions.    Granulation tissue in the right mid inferior external auditory canal fully cleaned.  Fully cleaned under microscope using instrumentation and suctioning.    Normal tympanic membranes.     Nasal External nose - Normal.   Nasal septum - Normal.  Turbinates - Normal.   Oral/Oropharynx Lips - Normal, Tonsils - Normal, Tongue - Normal    Neck Exam Inspection - Normal. Palpation - Normal. Parotid gland - Normal. Thyroid gland - Normal.   Lymph Detail Submental. Submandibular. Anterior cervical. Posterior cervical. Supraclavicular all without enlargement   Psychiatric Orientation - Oriented to time, place, person & situation. Appropriate mood and affect.   Neurological Memory - Normal. Cranial nerves - Cranial nerves II through XII grossly intact.     ASSESSMENT AND PLAN:   1. Granulation tissue  Patient placed on Floxin drops.    2. Cerumen debris on tympanic membrane of both ears  Cerumen fully cleaned.  Continue binaural hearing aids.  Follow-up in 6 months  time, sooner if problems.      The patient indicates understanding of these issues and agrees to the plan.      Jesenia Watkins MD  7/1/2025  6:18 PM       [1]   Current Outpatient Medications   Medication Sig Dispense Refill    dilTIAZem  MG Oral Capsule SR 24 Hr       ofloxacin 0.3 % Otic Solution Place 5 drops into the right ear at bedtime for 14 days. 5 mL 0    metoprolol succinate  MG Oral Tablet 24 Hr       ELIQUIS 5 MG Oral Tab Take 1 tablet twice a day by oral route.      polyethylene glycol, PEG 3350-KCl-NaBcb-NaCl-NaSulf, 236 g Oral Recon Soln Take 4,000 mL by mouth As Directed. Take 2,000 mL the night before your procedure and 2,000 mL the morning of your procedure. 1 each 0    losartan 25 MG Oral Tab Take 1 tablet (25 mg total) by mouth daily.      dilTIAZem HCl ER Beads 120 MG Oral Capsule SR 24 Hr Take 1 capsule (120 mg total) by mouth daily. 30 capsule 0    atorvastatin 40 MG Oral Tab Take 1 tablet every day by oral route. 90 tablet 4    estradiol 0.1 MG/GM Vaginal Cream Place 1 g vaginally daily. 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, right    R thenar mass    Essential hypertension    High blood pressure    PONV (postoperative nausea and vomiting)   [3]   Social History  Socioeconomic History    Marital status:    Tobacco Use    Smoking status: Never    Smokeless tobacco: Never   Vaping Use    Vaping status: Never Used   Substance and Sexual Activity    Alcohol use: No    Drug use: Never     Social Drivers of Health     Food Insecurity: No Food Insecurity (2/22/2025)    NCSS - Food Insecurity     Worried About Running Out of Food in the Last Year: No     Ran Out of Food in the Last Year: No   Transportation Needs: No Transportation Needs (2/22/2025)    NCSS - Transportation     Lack of Transportation: No   Housing Stability: Not At Risk (2/22/2025)    NCSS -  Housing/Utilities     Has Housing: Yes     Worried About Losing Housing: No     Unable to Get Utilities: No

## 2025-07-14 ENCOUNTER — HOSPITAL ENCOUNTER (OUTPATIENT)
Dept: MAMMOGRAPHY | Age: 75
Discharge: HOME | End: 2025-07-14
Attending: FAMILY MEDICINE
Payer: MEDICARE

## 2025-07-14 ENCOUNTER — HOSPITAL ENCOUNTER (OUTPATIENT)
Dept: BONE DENSITY | Age: 75
Discharge: HOME | End: 2025-07-14
Attending: FAMILY MEDICINE
Payer: MEDICARE

## 2025-07-14 DIAGNOSIS — Z00.00 ENCOUNTER FOR ANNUAL HEALTH EXAMINATION: ICD-10-CM

## 2025-07-14 DIAGNOSIS — Z78.0 POST-MENOPAUSAL: ICD-10-CM

## 2025-07-14 DIAGNOSIS — Z12.31 SCREENING MAMMOGRAM FOR BREAST CANCER: ICD-10-CM

## 2025-07-14 PROCEDURE — 77080 DXA BONE DENSITY AXIAL: CPT | Performed by: RADIOLOGY

## 2025-07-14 PROCEDURE — 77067 SCR MAMMO BI INCL CAD: CPT | Performed by: RADIOLOGY

## 2025-07-14 PROCEDURE — 77063 BREAST TOMOSYNTHESIS BI: CPT | Performed by: RADIOLOGY

## 2025-07-21 ENCOUNTER — RESULTS FOLLOW-UP (OUTPATIENT)
Dept: FAMILY MEDICINE CLINIC | Facility: CLINIC | Age: 75
End: 2025-07-21

## 2025-07-28 NOTE — PROGRESS NOTES
You have mild bone loss called osteopenia. No treatment needed but important to keep up with weight bearing exercise like walking, aerobics and strength training with resistance bands or light weights to build muscle. - Dr. Smith

## 2025-07-28 NOTE — TELEPHONE ENCOUNTER
[place]ian Language Line: Lee ID number [id number]. Patient called, verified Name and . Relayed Dr. Smith's message below. Patient verbalized understanding and had no further questions at this time.

## (undated) DIAGNOSIS — I05.0 MITRAL VALVE STENOSIS, UNSPECIFIED ETIOLOGY: ICD-10-CM

## (undated) DIAGNOSIS — I10 ESSENTIAL HYPERTENSION: Primary | ICD-10-CM

## (undated) DEVICE — ENCORE® LATEX MICRO SIZE 8, STERILE LATEX POWDER-FREE SURGICAL GLOVE: Brand: ENCORE

## (undated) DEVICE — GOWN SURG AERO BLUE PERF LG

## (undated) DEVICE — DRAPE SHEET LG

## (undated) DEVICE — TRAY SKIN PREP PVP-1

## (undated) DEVICE — KOMET MEDICAL

## (undated) DEVICE — PROXIMATE SKIN STAPLERS (35 WIDE) CONTAINS 35 STAINLESS STEEL STAPLES (FIXED HEAD): Brand: PROXIMATE

## (undated) DEVICE — BLADE SAW SAGITTAL 19.5

## (undated) DEVICE — GAMMEX® PI HYBRID SIZE 7, STERILE POWDER-FREE SURGICAL GLOVE, POLYISOPRENE AND NEOPRENE BLEND: Brand: GAMMEX

## (undated) DEVICE — CEMENT MIXING SYSTEM WITH FEMORAL BREAKWAY NOZZLE: Brand: REVOLUTION

## (undated) DEVICE — TOTAL KNEE: Brand: MEDLINE INDUSTRIES, INC.

## (undated) DEVICE — PLASTIC HAND: Brand: MEDLINE INDUSTRIES, INC.

## (undated) DEVICE — BLADE SW .5IN MCHC 2.75IN

## (undated) DEVICE — PAD THRP 16IN WRPON MU LNG STM

## (undated) DEVICE — Device

## (undated) DEVICE — SOL  .9 1000ML BTL

## (undated) DEVICE — ENCORE® LATEX MICRO SIZE 7.5, STERILE LATEX POWDER-FREE SURGICAL GLOVE: Brand: ENCORE

## (undated) DEVICE — ZIMMER® STERILE DISPOSABLE TOURNIQUET CUFF WITH PLC, DUAL PORT, SINGLE BLADDER, 30 IN. (76 CM)

## (undated) DEVICE — KIT DRN 1/8IN PVC 3 SPRG EVAC

## (undated) DEVICE — BATTERY

## (undated) DEVICE — TOURNIQUET CUFF

## (undated) DEVICE — SUTURE VICRYL 2-0 FS-1

## (undated) DEVICE — 3M™ IOBAN™ 2 ANTIMICROBIAL INCISE DRAPE 6650EZ: Brand: IOBAN™ 2

## (undated) DEVICE — SUTURE ETHILON 4-0 699G

## (undated) DEVICE — SUTURE SILK 4-0 P-3

## (undated) DEVICE — SUTURE VICRYL 0 CP-1

## (undated) DEVICE — SUTURE ETHIBOND 2 V-37

## (undated) DEVICE — POLAR CARE CUBE COOLING UNIT

## (undated) NOTE — IP AVS SNAPSHOT
[facility]            (For Outpatient Use Only) Initial Admit Date: 2/28/2019   Inpt/Obs Admit Date: Inpt: 2/28/19 / Obs: N/A   Discharge Date:    Otis Proper:  [de-identified]   MRN: [de-identified]   CSN: [id number]        ENCOUNTER  Patient Class Subscriber ID:  Pt Rel to Subscriber:    Hospital Account Financial Class: [facility] [place]    March 6, 2019

## (undated) NOTE — LETTER
21      Patient: Elvis Brooks  : 1950 Visit date: 2021    Dear Amy Bernal,      I examined your patient in consultation today. She has a firm mass of the right thenar eminence.   I would recommend excision    Thank you f

## (undated) NOTE — LETTER
10/20/2020              Randall Carbajal         Dear Bertrand Amador,    It was a pleasure to see you. Your mammogram was normal.  Plan to repeat in 1 year. Sincerely,    Rosalie Mead.  Sharene Credit, MD  [facility]

## (undated) NOTE — LETTER
Patient Name: Nimco Montiel  YOB: 1950          MRN number:  [id number]  Date:  4/2/2019  Referring Physician: Dr. Amilcar Pritchett P.T. EVALUATION:   Referring Physician: Dr. Nola Tanner  Diagnosis: R total knee arthroplasty  Date of Onse Stairs: ascent & descent: able to perform independently with reciprocal pattern B UE handrails    Today’s Treatment and Response:  Patient education provided on PT eval findings, treatment plan, goals, HEP  Patient received there ex, HEP  Charges: PT Eval,

## (undated) NOTE — LETTER
10/23/21        Nito Baltazar      Dear Tram Marie,    Our records indicate that you have outstanding lab work and or testing that was ordered for you and has not yet been completed:  Orders Placed This Encounte

## (undated) NOTE — IP AVS SNAPSHOT
Patient Demographics     Address  [address] Phone  [phone] [facility])  [phone] [facility]      Emergency Contact(s)     Name Relation Home Work Gabriele urbina Daughter   [phone]      Allergies as of 3/6/201 Take 17 g by mouth daily as needed. Dionne Dobson MD         rivaroxaban 10 MG Tabs  Commonly known as:  Michelle Gates  Next dose due:  TONIGHT  Notes to patient:  ALERT: take this medication as prescribed, to prevent blood clots and pulmonary embolism.  I Patient's Most Recent Weight       Most Recent Value   Patient Weight  63.5 kg (140 lb)      Lab Results Last 24 Hours    No matching results found     Microbiology Results (All)     None         H&P - H&P Note      H&P signed by Jerry Coulter MD at 2/28 Naproxen Sodium (ALEVE) 220 MG Oral Cap Take 220 mg by mouth nightly. Disp:  Rfl:  02/23/2019       Review of Systems:  10 point review of symptoms found to be Non Contributory    Physical Exam:  General: Alert, orientated x3. Cooperative.   No apparent di Author:  Eugenia Pulido DO Service:  Cardiology Author Type:  Physician    Filed:  3/2/2019  6:39 PM Status:  [place] Aaron Transcription    :  Eugenia Pulido DO (Physician)       [facility]    PATIENT'S NAME: Monica PEÑA PHYSI GENERAL:  Currently no acute distress. VITAL SIGNS:  Blood pressure here 135/58. Orthostatics today were negative. Temperature 99.2, respirations 16, heart rate in the 80s. HEENT:  Head is atraumatic.   Oral mucosa is moist.  NECK:  Without jugular veno to get her therapy. The findings of the echocardiogram were discussed with the patient's family and the patient indirectly. At this point, no surgical intervention is necessary for this. Thank you very much for the consultation.   Please do not hesitat course.  She wishes to proceed.           DISCHARGE DX:Vasovagal episode  -remote tele  -EKG ok  -monitor hgb   -follow and correct lytes  -CT brain ok   -echo reviewed  -carotids ok  -appreciate cardiology eval  -compression stockings and midodrine added BUN 8 03/04/2019     03/04/2019    K 3.9 03/04/2019     (H) 03/04/2019    CO2 25.0 03/04/2019    GLU 91 03/04/2019    CA 7.5 (L) 03/04/2019    MG 2.4 03/04/2019    TROP <0.045 03/01/2019       Recent Labs   Lab  03/02/19   0445  03/03/19   044 Take 5,000 mcg by mouth once a week.    Refills:  0        STOP taking these medications    ALEVE 220 MG Caps  Generic drug:  Naproxen Sodium              Where to Get Your Medications      Please  your prescriptions at the location directed by your  1950 MRN [id number]   Location [facility] 4W/SW/SE Attending Delia Zuniga MD   Hosp Day # 4 PCP No primary care provider on file.      Date of Admission: 2019     Date of Discharge: 0[RZ.1]5[RZ.2]      Hospital Discharge [facility] Result Date: 3/1/2019  CONCLUSION:  1. No acute intracranial finding. 2. Mild age-related changes of chronic small vessel disease in cerebral white matter. 3. Mild atherosclerotic calcification of the cavernous carotid arteries.     Dictated by (CST): Linn Commonly known as:  COLACE      Take 100 mg by mouth 2 (two) times daily. Refills:  0     HYDROcodone-acetaminophen 5-325 MG Tabs  Commonly known as:  NORCO      Take 1-2 tablets by mouth every 4 (four) hours as needed.    Quantity:  40 tablet  Refills: >30 MIN SPENT ON THIS DC   YVONNESHASHI JOHN VITOR  3/4/2019  1:17 PM[RZ.1]                        Electronically signed by Jaret Coronado MD on 3/5/2019 10:36 AM   Attribution Wong    RZ. 1 - Jaret Coronado MD on 3/4/2019  1:17 PM  RZ. 2 - [employer] -compression stockings and midodrine added  -ok to resume PT      Osteoarthritis of right knee  -pain control  -xarelto for VTE P  -will need KANCHAN on DC       Essential hypertension  -metoprolol              CULTURE:   No results found for this visit on 02/ RBC  3.29*  3.49*  3.37*   HGB  7.9*  8.5*  8.2*   HCT  25.5*  26.8*  25.9*   MCV  77.5*  76.8*  76.9*   MCH  24.0*  24.4*  24.3*   MCHC  31.0  31.7  31.7   RDW  16.5*  16.3*  16.4*   NEPRELIM  5.98  5.15  3.13   WBC  8.0  7.5  5.3   PLT  145.0*  166.0  19 Bring a paper prescription for each of these medications  · HYDROcodone-acetaminophen 5-325 MG Tabs         Follow up Visits  Tatiana Pastor DO  [address]  [place] [address] [id number]    In 1 week      Johana Gonzalez MD  [address] [place] [address] ft with RW and CGA;provided cuing for gait pattern. Ther ex. Cont with PT    The patient's Approx Degree of Impairment: 50.57% has been calculated based on documentation in the [facility] '6 clicks' Inpatient Basic Mobility Short Form.   Research supports that pat Gait Assistance: Contact guard assist  Distance (ft): 100  Assistive Device: Rolling walker  Pattern: R Decreased stance time  Stoop/Curb Assistance: Not tested      Exercises AM Session PM Session   Ankle Pumps 0 reps 20 reps   Quad Sets 20 reps 20 reps :  Brett Monzon (Physical Therapy Assistant)    Related Notes:  Original Note by Brett Monzon (Physical Therapy Assistant) filed at 3/4/2019  2:44 PM    Cosigner:  Noreen Jimenez PTA at 3/4/2019  2:48 PM       PHYSICAL THERAPY and required cuing for proper technique with LLE assist with RLE LAQs. Pt left at end of session with all needs within reach and family member still present. Pt plans on [address] later this PM once medically cleared.  Pt status reported to RN.[AK.3]    The isaias How much help from another person does the patient currently need. .. [AK.1]   -   Moving to and from a bed to a chair (including a wheelchair)?: A Little   -   Need to walk in hospital room?: A Little   -   Climbing 3-5 steps with a railing?: A Lot[AK. 2] Goal #4   Current Status Not tested am[AK. 1]/pm[AK. 3]   Goal #5  AROM 0 degrees extension to 95 degrees flexion     Goal #5   Current Status AROM flexion 79 degrees, extension 5 degrees am[AK. 1]  Not tested pm[AK. 3]   Goal #6 Patient independently performs wiping once finished urinating. Pt amb out into hallway then back to bs chair 65' w/RW CGA for safety. Pt demonstrated decreased stance time on RLE and decreased candice. Pt also demonstrated good eccentric control when lowering to bs chair.  Pt performed s Weight Bearing Restriction: R lower extremity        R Lower Extremity: Weight Bearing as Tolerated[AK.2]       PAIN ASSESSMENT[AK.1]   Rating: (Did not rate)  Location: R knee  Management Techniques: Activity promotion; Body mechanics;Repositioning;Relaxat Patient End of Session: Up in chair;Needs met;Call light within reach;RN aware of session/findings; All patient questions and concerns addressed; Family present[AK. 2]    CURRENT GOALS  Goals to be met by: 3/14/19  Patient Goal Patient's self-stated goal is Related Notes:  Addendum by Yasir Sánchez (Physical Therapy Assistant) filed at 3/4/2019  2:44 PM    Cosign Required:  Yes       PHYSICAL THERAPY KNEE TREATMENT NOTE - INPATIENT     Room Number: 448/814-C             Presenting Problem: R TKA    P Weight Bearing Restriction: R lower extremity        R Lower Extremity: Weight Bearing as Tolerated       PAIN ASSESSMENT   Ratin  Location: R knee  Management Techniques: Activity promotion; Body mechanics; Relaxation;Repositioning    BALANCE  Static Si addressed; Family present    CURRENT GOALS  Goals to be met by: 3/14/19  Patient Goal Patient's self-stated goal is: to go to rehab   Goal #1 Patient is able to demonstrate supine - sit EOB @ level: modified independent     Goal #1   Current Status Supine t and to transfer to the EOB. Pt required assist with her R LE and to pull her upper trunk to sit up at the EOB. Pt sat EOB for a few minutes and BP taken in sitting and standing positions. Pt's BP was 150/77 EOB and 159/80 standing.  Pt with slight dizziness AM-PAC '6-Clicks' INPATIENT SHORT FORM - BASIC MOBILITY  How much difficulty does the patient currently have. ..  -   Turning over in bed (including adjusting bedclothes, sheets and blankets)?: A Lot   -   Sitting down on and standing up from a chair with a assistance level: modified independent    Goal #3   Current Status SPT with RW with Min A x2   Goal #4 Patient will negotiate 3 stairs/one curb w/ assistive device and supervision   Goal #4   Current Status NT   Goal #5  AROM 0 degrees extension to 95 degr documentation in the [facility] '6 clicks' Inpatient Daily Activity Short Form.   Research supports that patients with this level of impairment may benefit from home with supervision from family, however , vs discharge to [facility] 12 2/ to pt overall decreased endurance Toilet Transfer: supervision with grab bar  Shower Transfer: NT  Chair Transfer: supervision  Car Transfer: discussed with pt /family car transfers    Bedroom Mobility: pt tolerated ambulating 25 ft in room with RW for support and with supervision with inc :  Jose L Johnson OT (Occupational Therapist)       OCCUPATIONAL THERAPY TREATMENT NOTE - INPATIENT    Room Number: 864/168-I         Presenting Problem: (R TKA )     Problem List  Principal Problem:    Osteoarthritis of right knee  Active Probl How much help from another person does the patient currently need…  -   Putting on and taking off regular lower body clothing?: A Little  -   Bathing (including washing, rinsing, drying)?: A Little  -   Toileting, which includes using toilet, bedpan or uri :  Italia Page OT (Occupational Therapist)       OCCUPATIONAL THERAPY TREATMENT NOTE - INPATIENT    Room Number: 141/122-P         Presenting Problem: (R TKA )     Problem List  Principal Problem:    Osteoarthritis of right knee  Active Problems: Pt agreeable to OT session.      OBJECTIVE  Precautions: Limb alert - right    WEIGHT BEARING RESTRICTION  Weight Bearing Restriction: R lower extremity        R Lower Extremity: Weight Bearing as Tolerated       PAIN ASSESSMENT  Rating: 3  Location: (RLE) Progressing-CGA w/RW     Patient will complete self care tasks in standing at sink level with SBA. Comment: N/t     Patient will complete toileting with SBA. Comment: Progressing-min.  A             Goals  on: 2019  Frequency: 3-5x/week